# Patient Record
Sex: FEMALE | URBAN - METROPOLITAN AREA
[De-identification: names, ages, dates, MRNs, and addresses within clinical notes are randomized per-mention and may not be internally consistent; named-entity substitution may affect disease eponyms.]

---

## 2022-12-13 ENCOUNTER — OFFICE (OUTPATIENT)
Dept: URBAN - METROPOLITAN AREA CLINIC 93 | Facility: CLINIC | Age: 27
Setting detail: OPHTHALMOLOGY
End: 2022-12-13
Payer: COMMERCIAL

## 2022-12-13 DIAGNOSIS — H53.40: ICD-10-CM

## 2022-12-13 PROCEDURE — 92250 FUNDUS PHOTOGRAPHY W/I&R: CPT | Performed by: OPHTHALMOLOGY

## 2022-12-13 PROCEDURE — 92133 CPTRZD OPH DX IMG PST SGM ON: CPT | Performed by: OPHTHALMOLOGY

## 2022-12-13 PROCEDURE — 92004 COMPRE OPH EXAM NEW PT 1/>: CPT | Performed by: OPHTHALMOLOGY

## 2022-12-13 PROCEDURE — 92083 EXTENDED VISUAL FIELD XM: CPT | Performed by: OPHTHALMOLOGY

## 2022-12-13 ASSESSMENT — SPHEQUIV_DERIVED: OD_SPHEQUIV: 2.125

## 2022-12-13 ASSESSMENT — AXIALLENGTH_DERIVED: OD_AL: 22.5378

## 2022-12-13 ASSESSMENT — KERATOMETRY
OS_K1POWER_DIOPTERS: 43.50
OS_K2POWER_DIOPTERS: 44.25
OS_AXISANGLE_DEGREES: 072
OD_K2POWER_DIOPTERS: 45.25
OD_K1POWER_DIOPTERS: 43.25
OD_AXISANGLE_DEGREES: 093

## 2022-12-13 ASSESSMENT — TONOMETRY
OS_IOP_MMHG: 13
OD_IOP_MMHG: 12

## 2022-12-13 ASSESSMENT — CONFRONTATIONAL VISUAL FIELD TEST (CVF)
OD_FINDINGS: FULL
OS_FINDINGS: FULL

## 2022-12-13 ASSESSMENT — REFRACTION_AUTOREFRACTION
OD_AXIS: 002
OD_CYLINDER: -1.25
OS_SPHERE: PLANO
OD_SPHERE: +2.75

## 2022-12-13 ASSESSMENT — VISUAL ACUITY
OS_BCVA: 20/40-2
OD_BCVA: 20/20-1

## 2022-12-14 ENCOUNTER — OFFICE (OUTPATIENT)
Dept: URBAN - METROPOLITAN AREA CLINIC 77 | Facility: CLINIC | Age: 27
Setting detail: OPHTHALMOLOGY
End: 2022-12-14
Payer: COMMERCIAL

## 2022-12-14 ENCOUNTER — INPATIENT (INPATIENT)
Facility: HOSPITAL | Age: 27
LOS: 14 days | Discharge: HOME | DRG: 60 | End: 2022-12-29
Attending: SPECIALIST | Admitting: SPECIALIST
Payer: COMMERCIAL

## 2022-12-14 VITALS
SYSTOLIC BLOOD PRESSURE: 156 MMHG | HEIGHT: 63 IN | RESPIRATION RATE: 20 BRPM | HEART RATE: 84 BPM | DIASTOLIC BLOOD PRESSURE: 89 MMHG | OXYGEN SATURATION: 99 % | WEIGHT: 169.98 LBS | TEMPERATURE: 98 F

## 2022-12-14 DIAGNOSIS — H54.61 UNQUALIFIED VISUAL LOSS, RIGHT EYE, NORMAL VISION LEFT EYE: ICD-10-CM

## 2022-12-14 DIAGNOSIS — U07.1: ICD-10-CM

## 2022-12-14 DIAGNOSIS — H53.40: ICD-10-CM

## 2022-12-14 DIAGNOSIS — H46.8: ICD-10-CM

## 2022-12-14 PROBLEM — Z00.00 ENCOUNTER FOR PREVENTIVE HEALTH EXAMINATION: Status: ACTIVE | Noted: 2022-12-14

## 2022-12-14 LAB
ALBUMIN SERPL ELPH-MCNC: 4.2 G/DL — SIGNIFICANT CHANGE UP (ref 3.3–5)
ALP SERPL-CCNC: 113 U/L — SIGNIFICANT CHANGE UP (ref 40–120)
ALT FLD-CCNC: 47 U/L — HIGH (ref 10–45)
ANION GAP SERPL CALC-SCNC: 13 MMOL/L — SIGNIFICANT CHANGE UP (ref 5–17)
AST SERPL-CCNC: 26 U/L — SIGNIFICANT CHANGE UP (ref 10–40)
BASOPHILS # BLD AUTO: 0.04 K/UL — SIGNIFICANT CHANGE UP (ref 0–0.2)
BASOPHILS NFR BLD AUTO: 0.5 % — SIGNIFICANT CHANGE UP (ref 0–2)
BILIRUB SERPL-MCNC: 0.3 MG/DL — SIGNIFICANT CHANGE UP (ref 0.2–1.2)
BUN SERPL-MCNC: 11 MG/DL — SIGNIFICANT CHANGE UP (ref 7–23)
CALCIUM SERPL-MCNC: 9.6 MG/DL — SIGNIFICANT CHANGE UP (ref 8.4–10.5)
CHLORIDE SERPL-SCNC: 102 MMOL/L — SIGNIFICANT CHANGE UP (ref 96–108)
CO2 SERPL-SCNC: 23 MMOL/L — SIGNIFICANT CHANGE UP (ref 22–31)
CREAT SERPL-MCNC: 0.72 MG/DL — SIGNIFICANT CHANGE UP (ref 0.5–1.3)
CRP SERPL-MCNC: 9 MG/L — HIGH (ref 0–4)
EGFR: 117 ML/MIN/1.73M2 — SIGNIFICANT CHANGE UP
EOSINOPHIL # BLD AUTO: 0.14 K/UL — SIGNIFICANT CHANGE UP (ref 0–0.5)
EOSINOPHIL NFR BLD AUTO: 1.7 % — SIGNIFICANT CHANGE UP (ref 0–6)
ERYTHROCYTE [SEDIMENTATION RATE] IN BLOOD: 53 MM/HR — HIGH (ref 0–15)
FLUAV AG NPH QL: SIGNIFICANT CHANGE UP
FLUBV AG NPH QL: SIGNIFICANT CHANGE UP
GLUCOSE SERPL-MCNC: 96 MG/DL — SIGNIFICANT CHANGE UP (ref 70–99)
HCG SERPL-ACNC: <2 MIU/ML — SIGNIFICANT CHANGE UP
HCT VFR BLD CALC: 42.1 % — SIGNIFICANT CHANGE UP (ref 34.5–45)
HGB BLD-MCNC: 13.3 G/DL — SIGNIFICANT CHANGE UP (ref 11.5–15.5)
IMM GRANULOCYTES NFR BLD AUTO: 0.4 % — SIGNIFICANT CHANGE UP (ref 0–0.9)
LYMPHOCYTES # BLD AUTO: 2.61 K/UL — SIGNIFICANT CHANGE UP (ref 1–3.3)
LYMPHOCYTES # BLD AUTO: 32.3 % — SIGNIFICANT CHANGE UP (ref 13–44)
MCHC RBC-ENTMCNC: 25.8 PG — LOW (ref 27–34)
MCHC RBC-ENTMCNC: 31.6 GM/DL — LOW (ref 32–36)
MCV RBC AUTO: 81.7 FL — SIGNIFICANT CHANGE UP (ref 80–100)
MONOCYTES # BLD AUTO: 0.51 K/UL — SIGNIFICANT CHANGE UP (ref 0–0.9)
MONOCYTES NFR BLD AUTO: 6.3 % — SIGNIFICANT CHANGE UP (ref 2–14)
NEUTROPHILS # BLD AUTO: 4.74 K/UL — SIGNIFICANT CHANGE UP (ref 1.8–7.4)
NEUTROPHILS NFR BLD AUTO: 58.8 % — SIGNIFICANT CHANGE UP (ref 43–77)
NRBC # BLD: 0 /100 WBCS — SIGNIFICANT CHANGE UP (ref 0–0)
PLATELET # BLD AUTO: 331 K/UL — SIGNIFICANT CHANGE UP (ref 150–400)
POTASSIUM SERPL-MCNC: 4.1 MMOL/L — SIGNIFICANT CHANGE UP (ref 3.5–5.3)
POTASSIUM SERPL-SCNC: 4.1 MMOL/L — SIGNIFICANT CHANGE UP (ref 3.5–5.3)
PROT SERPL-MCNC: 8.6 G/DL — HIGH (ref 6–8.3)
RBC # BLD: 5.15 M/UL — SIGNIFICANT CHANGE UP (ref 3.8–5.2)
RBC # FLD: 14.1 % — SIGNIFICANT CHANGE UP (ref 10.3–14.5)
RSV RNA NPH QL NAA+NON-PROBE: SIGNIFICANT CHANGE UP
SARS-COV-2 RNA SPEC QL NAA+PROBE: SIGNIFICANT CHANGE UP
SODIUM SERPL-SCNC: 138 MMOL/L — SIGNIFICANT CHANGE UP (ref 135–145)
WBC # BLD: 8.07 K/UL — SIGNIFICANT CHANGE UP (ref 3.8–10.5)
WBC # FLD AUTO: 8.07 K/UL — SIGNIFICANT CHANGE UP (ref 3.8–10.5)

## 2022-12-14 PROCEDURE — G1004: CPT

## 2022-12-14 PROCEDURE — 92083 EXTENDED VISUAL FIELD XM: CPT | Performed by: OPHTHALMOLOGY

## 2022-12-14 PROCEDURE — 92235 FLUORESCEIN ANGRPH MLTIFRAME: CPT | Performed by: OPHTHALMOLOGY

## 2022-12-14 PROCEDURE — 92250 FUNDUS PHOTOGRAPHY W/I&R: CPT | Performed by: OPHTHALMOLOGY

## 2022-12-14 PROCEDURE — 99215 OFFICE O/P EST HI 40 MIN: CPT | Performed by: OPHTHALMOLOGY

## 2022-12-14 PROCEDURE — 99285 EMERGENCY DEPT VISIT HI MDM: CPT

## 2022-12-14 PROCEDURE — 99222 1ST HOSP IP/OBS MODERATE 55: CPT

## 2022-12-14 PROCEDURE — 70498 CT ANGIOGRAPHY NECK: CPT | Mod: 26

## 2022-12-14 PROCEDURE — 70496 CT ANGIOGRAPHY HEAD: CPT | Mod: 26,MG

## 2022-12-14 RX ORDER — ENOXAPARIN SODIUM 100 MG/ML
40 INJECTION SUBCUTANEOUS EVERY 24 HOURS
Refills: 0 | Status: DISCONTINUED | OUTPATIENT
Start: 2022-12-14 | End: 2022-12-16

## 2022-12-14 RX ORDER — PANTOPRAZOLE SODIUM 20 MG/1
40 TABLET, DELAYED RELEASE ORAL DAILY
Refills: 0 | Status: DISCONTINUED | OUTPATIENT
Start: 2022-12-14 | End: 2022-12-29

## 2022-12-14 RX ADMIN — Medication 58 MILLIGRAM(S): at 18:12

## 2022-12-14 ASSESSMENT — REFRACTION_MANIFEST
OS_SPHERE: +2.75
OD_AXIS: 5
OD_CYLINDER: -1.25
OD_VA1: 20/50
OD_SPHERE: +1.50

## 2022-12-14 ASSESSMENT — VISUAL ACUITY
OS_BCVA: 20/50
OD_BCVA: 20/20-1

## 2022-12-14 ASSESSMENT — KERATOMETRY
OS_K2POWER_DIOPTERS: 44.25
OD_K1POWER_DIOPTERS: 43.25
OD_AXISANGLE_DEGREES: 093
OS_K1POWER_DIOPTERS: 43.50
OS_AXISANGLE_DEGREES: 072
OD_K2POWER_DIOPTERS: 45.25

## 2022-12-14 ASSESSMENT — REFRACTION_AUTOREFRACTION
OD_SPHERE: +2.75
OD_AXIS: 002
OS_SPHERE: PLANO
OD_CYLINDER: -1.25

## 2022-12-14 ASSESSMENT — AXIALLENGTH_DERIVED
OD_AL: 22.9912
OD_AL: 22.5378

## 2022-12-14 ASSESSMENT — CONFRONTATIONAL VISUAL FIELD TEST (CVF)
OD_FINDINGS: FULL
OS_FINDINGS: FULL

## 2022-12-14 ASSESSMENT — SPHEQUIV_DERIVED
OD_SPHEQUIV: 2.125
OD_SPHEQUIV: 0.875

## 2022-12-14 NOTE — ED ADULT NURSE NOTE - NSIMPLEMENTINTERV_GEN_ALL_ED
Implemented All Universal Safety Interventions:  [place] to call system. Call bell, personal items and telephone within reach. Instruct patient to call for assistance. Room bathroom lighting operational. Non-slip footwear when patient is off stretcher. Physically safe environment: no spills, clutter or unnecessary equipment. Stretcher in lowest position, wheels locked, appropriate side rails in place.

## 2022-12-14 NOTE — ED PROVIDER NOTE - PROGRESS NOTE DETAILS
Terrence, PGY4 - ophtho at bedside Terrence, PGY4 - pt seen by neuro, recommending high dose steroids (Solumedrol 1000mg) & MRI brain/orbits/C/T spine. Spoke to radiologist, states MRI schedule currently full unlikely to be performed emergently; did recommend CT for more urgent imaging. D/w pt, pt agreeable to CT

## 2022-12-14 NOTE — ED ADULT NURSE NOTE - OBJECTIVE STATEMENT
27 y f came to the ed c/o right eye vision loss. started yesterday morning when she woke up with blurry vision. blurry vision has gradually become worse to vision loss. says the vision loss is in the bottom right part of the eye. went to 2 different eye doctors yesterday and was sent to the ED for further work up and imaging. patient denies any complaints or trauma. no weakness, facial droop or extremity drop.

## 2022-12-14 NOTE — H&P ADULT - NSHPPHYSICALEXAM_GEN_ALL_CORE
Vital Signs Last 24 Hrs  T(C): 36.9 (14 Dec 2022 13:29), Max: 36.9 (14 Dec 2022 13:29)  T(F): 98.4 (14 Dec 2022 13:29), Max: 98.4 (14 Dec 2022 13:29)  HR: 84 (14 Dec 2022 13:29) (84 - 84)  BP: 156/89 (14 Dec 2022 13:29) (156/89 - 156/89)  RR: 20 (14 Dec 2022 13:29) (20 - 20)  SpO2: 99% (14 Dec 2022 13:29) (99% - 99%)    General:  Constitutional: Seated in examination chair, appears stated age, in some distress due to symptoms.  Head: Normocephalic & atraumatic.  Eyes:   Respiratory: Breathing comfortably on room air.    Neurological (>12):  MS: Eyes open, alert, oriented to person, place, situation, time. Recent and remote memory intact. Normal affect. Follows all commands.  Speech/Language: Clear, fluent with good repetition. Naming intact.  CNs: PERRL. Inferior half of visual field impaired OD. EOMI, no nystagmus, no diplopia. V1-3 intact to LT, Well developed masseter muscles b/l. No facial asymmetry b/l, full eye closure strength b/l. Hearing grossly normal to conversation. Symmetric palate elevation in midline, no uvula deviation. Gag reflex deferred. Head turning & shoulder shrug intact b/l. Tongue midline, normal movements.  Motor: Muscle bulk and tone are normal. There is no pronator drift.     RUE/LUE: 5/5 shoulder abductors, elbow flexors/extensors, wrist flexors/extensors, finger .    RLE/LLE: 5/5 hip flexors, knee flexors/extensors, ankle dorsiflexors and plantarflexors.  Sensation: Intact to LT b/l throughout.  Cortical: No hemineglect, no extinction to double sided stimulation (tactile).  Reflexes: 2+ biceps, brachioradialis, triceps, and patellars b/l.   Coordination: No dysmetria to FTN/HTS.  Gait: Not assessed due to safety concerns. Vital Signs Last 24 Hrs  T(C): 36.9 (14 Dec 2022 13:29), Max: 36.9 (14 Dec 2022 13:29)  T(F): 98.4 (14 Dec 2022 13:29), Max: 98.4 (14 Dec 2022 13:29)  HR: 84 (14 Dec 2022 13:29) (84 - 84)  BP: 156/89 (14 Dec 2022 13:29) (156/89 - 156/89)  RR: 20 (14 Dec 2022 13:29) (20 - 20)  SpO2: 99% (14 Dec 2022 13:29) (99% - 99%)    General:  Constitutional: Seated in examination chair, appears stated age, in some distress due to symptoms.  Head: Normocephalic & atraumatic.  Eyes: Normal fundus with sharp discs OU.  Respiratory: Breathing comfortably on room air.    Neurological (>12):  MS: Eyes open, alert, oriented to person, place, situation, time. Recent and remote memory intact. Normal affect. Follows all commands.  Speech/Language: Clear, fluent with good repetition. Naming intact.  CNs: PERRL. +RAPD OD.  Inferior half of visual field impaired OD. Visual acuity 20/200 OD and 20/20 OS, EOMI, no nystagmus, no diplopia. V1-3 intact to LT, Well developed masseter muscles b/l. No facial asymmetry b/l, full eye closure strength b/l. Hearing grossly normal to conversation. Symmetric palate elevation in midline, no uvula deviation. Gag reflex deferred. Head turning & shoulder shrug intact b/l. Tongue midline, normal movements.  Motor: Muscle bulk and tone are normal. There is no pronator drift.     RUE/LUE: 5/5 shoulder abductors, elbow flexors/extensors, wrist flexors/extensors, finger .    RLE/LLE: 5/5 hip flexors, knee flexors/extensors, ankle dorsiflexors and plantarflexors.  Sensation: Intact to LT b/l throughout.  Cortical: No hemineglect, no extinction to double sided stimulation (tactile).  Reflexes: 2+ biceps, brachioradialis, triceps, and patellars b/l.   Coordination: No dysmetria to FTN/HTS.  Gait: Not assessed due to safety concerns.

## 2022-12-14 NOTE — ED PROVIDER NOTE - ATTENDING CONTRIBUTION TO CARE
Nolan You MD:  I personally saw the patient and performed a substantive portion of the visit including all aspects of the medical decision making.    MDM: 27-year-old female with history of asthma who presents with painless right vision loss that started yesterday morning when patient woke up.  Patient states that the inferior visual field is blurry and has been progressing/worsening.  Patient denies any double vision, eye pain, numbness, weakness, slurred speech, difficulty with ambulation.  Patient was sent in by retinal specialist.  On examination patient has significantly decreased vision in the right eye over the inferior visual field.  Significantly decreased visual acuity of the right eye.  There is no injection, diplopia.  Remainder of eye examination deferred to ophthalmology.  Neurologic examination benign.  Differential includes but is not limited to acute orbital pathology including optic neuritis, retinal detachment, vitreous hemorrhage, CRAO/CRVO.  Less likely due to CVA given no neurodeficits on examination.  Will obtain CT Head to evaluate for acute intracranial pathology.  We will also obtain CTA of head and neck.  Neurology and ophthalmology consulted.  Neurology recommended high-dose steroids and MRI.  Neurology accepted patient to their service.  The patient will need to be admitted to the hospital for continued evaluation and management, as well as to optimize medical management and to provide outpatient needs assessment.  Discussed with the accepting physician regarding the initial presentation, diagnostic studies, treatments given in the ED, and current plan of care.   The patient was accepted by and endorsed to the neurology team pending full labs and imaging and ophthalmology recommendations. Nolan You MD:  I personally saw the patient and performed a substantive portion of the visit including all aspects of the medical decision making.    MDM: 27-year-old female with history of asthma who presents with painless right vision loss that started yesterday morning when patient woke up.  Patient states that the inferior visual field is blurry and has been progressing/worsening.  Patient denies any double vision, eye pain, numbness, weakness, slurred speech, difficulty with ambulation.  Patient was sent in by retinal specialist.  On examination patient has significantly decreased vision in the right eye over the inferior visual field.  Significantly decreased visual acuity of the right eye.  There is no injection, diplopia.  Remainder of eye examination deferred to ophthalmology.  Neurologic examination benign.  Differential includes but is not limited to acute orbital pathology including optic neuritis, retinal detachment, vitreous hemorrhage, CRAO/CRVO.  Less likely due to CVA given no neurodeficits on examination.  Will obtain CT Head to evaluate for acute intracranial pathology.  We will also obtain CTA of head and neck.  Neurology and ophthalmology consulted.  Neurology recommended high-dose steroids and MRI.    Labs and CT/CTA imaging reviewed, nonactionable.  Discussed with consulting services.  Neurology accepted patient to their service.  The patient will need to be admitted to the hospital for continued evaluation and management, as well as to optimize medical management and to provide outpatient needs assessment.  Discussed with the accepting physician regarding the initial presentation, diagnostic studies, treatments given in the ED, and current plan of care.   The patient was accepted by and endorsed to the neurology team.

## 2022-12-14 NOTE — H&P ADULT - HISTORY OF PRESENT ILLNESS
27 year-old woman with a PMH of asthma brought in by family to the ED on 12/14/22 with c/o acute vision loss OD of 2 days duration. Patient reports symptom onset on 12/13, the morning prior to presentation, when she woke up from sleep around 07:45 AM. At that time she noted the lower half of her vision in her right eye was blurry. She reports her vision remained unchanged for the duration of the day, until she went to bed. Upon waking up today, she states the vision in her right eye had worsened, becoming blurry in the entire eye, as well as dark/black in the lower half of the visual field. She reports evaluation by an Ophthalmologist the day prior, shortly after initial symptom onset, and by a retinal specialist today, who informed her she may have "retrobulbar optic neuropathy", prompting her presentation to the ED. She does not report any associated pain with eye movements, diplopia, headache, dizziness, numbness, tingling, weakness, or difficulty with gait. No recent fever, chills, night sweats, myalgias, unintentional weight loss or weight gain, difficulty chewing or swallowing, recent illness, injury, or recent travel. She has never had symptoms like this before. She has a sister with a thyroid disorder, but otherwise no known family history of autoimmune disorders.

## 2022-12-14 NOTE — H&P ADULT - NSHPLABSRESULTS_GEN_ALL_CORE
LABS:  CBC                       13.3   8.07  )-----------( 331      ( 14 Dec 2022 16:11 )             42.1     Chem 12-14    138  |  102  |  11  ----------------------------<  96  4.1   |  23  |  0.72    Ca    9.6      14 Dec 2022 16:11    TPro  8.6<H>  /  Alb  4.2  /  TBili  0.3  /  DBili  x   /  AST  26  /  ALT  47<H>  /  AlkPhos  113  12-14    LFTs LIVER FUNCTIONS - ( 14 Dec 2022 16:11 )  Alb: 4.2 g/dL / Pro: 8.6 g/dL / ALK PHOS: 113 U/L / ALT: 47 U/L / AST: 26 U/L / GGT: x

## 2022-12-14 NOTE — H&P ADULT - ASSESSMENT
27 year-old woman with a PMH of asthma brought in by family to the ED on 12/14/22 with c/o acute vision loss OD of 2 days duration described as initial blurry vision in the inferior half of the visual field in the right eye, with progression to blurry vision in the entire right eye and darkening of the inferior half of the visual field in the right eye, for which Neurology was consulted. VS on presentation notable for /89, otherwise wnl. CBC, CMP wnl. Exam notable for decreased visual acuity OD (20/200) compared to OS (20/20), inferior visual field deficit OD, red color desaturation OD.     Impression: Acute vision loss OD possibly due to optic neuritis vs secondary to underlying ischemic vs toxic-metabolic vs infiltrative or compressive etiologies.    Plan:   [] MRI brain & orbits w/wo contrast  [] MRI C and T-Spine w/wo contrast (to evaluate for other demyelinating lesions)  [] Solumedrol 1g IV for 3 days followed by PO taper as per ONTT   [] SERUM: A1C, lipid panel, ESR, CRP, TSH, vitamin D 25-OH, B12, B9, NMO ab, MOG, HAIDER, ANCA, ACE, Lyme Ab, quantiferon gold, RPR  [] Possible LP, pending imaging  [] Opthalmology evaluation     #Preventive Measures  - ISS & POCT glucose monitoring  - GI ppx with PPI while on high dose steroids  - Telemonitoring, Neurochecks/VS Q4H  - DVT ppx   27 year-old woman with a PMH of asthma brought in by family to the ED on 12/14/22 with c/o acute vision loss OD of 2 days duration described as initial blurry vision in the inferior half of the visual field in the right eye, with progression to blurry vision in the entire right eye and darkening of the inferior half of the visual field in the right eye, for which Neurology was consulted. VS on presentation notable for /89, otherwise wnl. CBC, CMP wnl. Exam notable for decreased visual acuity OD (20/200) compared to OS (20/20), inferior visual field deficit OD, red color desaturation OD.     Impression: Acute vision loss OD possibly due to optic neuritis vs secondary to underlying ischemic vs toxic-metabolic vs infiltrative or compressive etiologies.    Plan:   [] MRI brain & orbits w/wo contrast  [] MRI C and T-Spine w/wo contrast (to evaluate for other demyelinating lesions)  [] Solumedrol 1g IV for 3 days followed by PO taper as per ONTT   [] SERUM: A1C, lipid panel, ESR, CRP, TSH, vitamin D 25-OH, B12, B9, NMO ab, MOG, HAIDER, ANCA, ACE, Lyme Ab, quantiferon gold, RPR  [] Possible LP, pending imaging  [] Ophthalmology evaluation     #Preventive Measures  - ISS & POCT glucose monitoring  - GI ppx with PPI while on high dose steroids  - Telemonitoring, Neurochecks/VS Q4H  - DVT ppx   27 year-old woman with a PMH of asthma brought in by family to the ED on 12/14/22 with c/o acute vision loss OD of 2 days duration described as initial blurry vision in the inferior half of the visual field in the right eye, with progression to blurry vision in the entire right eye and darkening of the inferior half of the visual field in the right eye, for which Neurology was consulted. VS on presentation notable for /89, otherwise wnl. CBC, CMP wnl. Exam notable for decreased visual acuity OD (20/200) compared to OS (20/20), +RAPD OD, inferior visual field deficit OD, possible red color desaturation OD.     Impression: Acute vision loss OD possibly due to optic neuritis vs optic neuropathy secondary to ischemic vs toxic-metabolic vs infiltrative or compressive etiologies.    Plan:   [] MRI brain & orbits w/wo contrast  [] MRI C and T-Spine w/wo contrast (to evaluate for demyelinating lesions)  [] Solumedrol 1g IV for 3 days followed by PO taper as per ONTT   [] SERUM: A1C, lipid panel, ESR, CRP, TSH, vitamin D 25-OH, B12, B9, NMO ab, MOG, HAIDER, ANCA, ACE, Lyme Ab, quantiferon gold, RPR  [] Possible LP, pending imaging  [] Ophthalmology evaluation     #Preventive Measures  - ISS & POCT glucose monitoring  - GI ppx with PPI while on high dose steroids  - Telemonitoring, Neurochecks/VS Q4H  - DVT ppx

## 2022-12-14 NOTE — CONSULT NOTE ADULT - ATTENDING COMMENTS
I have seen and examined the patient and agree with the findings based on the resident note above. Ophthalmology will continue to follow. 60 min spent on patient encounter.

## 2022-12-14 NOTE — ED PROVIDER NOTE - PHYSICAL EXAMINATION
I have reviewed the triage vital signs.  Const: AAOx3, in NAD  Eyes: External appearance: OD no discharge or conjunctival injection; OS no discharge or conjunctival injection  Pupils: OD round and reactive; OS round and reactive  Extraocular movements: OD intact; OS intact  Visual Fields: OD intact x 3, inferior visual field loss defect; OS intact x 4  Visual Acuity: OD 20/200; OS 20/20  Lids/Lashes/Lacrimal: OD no lesions; OS no lesions  Conjunctiva & Sclera: OD white; OS white  HENT: NCAT, Neck supple, oral mucosa moist  CV: RRR, +S1, S2  Resp: CTAB, no respiratory distress  GI: Abdomen soft, NTND, no guarding  : no CVA tenderness  Extremities: No peripheral edema,  2+ radial and DP pulses  Skin: Warm, well perfused, no rash  MSK: No gross deformities appreciated  Neuro: CN2-12 grossly intact,MS +5/5 in UE and LE BL, finger to nose smooth and rapid, gross sensation intact in UE and LE BL, gait smooth and coordinated, negative rhomberg, negative pronator drift   Psych: Appropriate mood and affect

## 2022-12-14 NOTE — ED PROVIDER NOTE - OBJECTIVE STATEMENT
27F PMH asthma Presents with painless right vision loss.  Patient reports being in usual state of health until waking up at 7:45 AM.  States that the right inferior aspect of her vision was blurry.  Saw an ophthalmologist yesterday and was recommended to see a retinal specialist today.  Patient states that vision has progressively worsened and that the inferior aspect of R visual field is now black.  Denies color vision change.  Denies any eye pain, fevers, nausea, vomiting, numbness, tingling, weakness.  No difficulty swallowing/speaking/walking.  No recent viral illness or other infection.  Ophthalmology exam notes reviewed and and showing OD inferior visual field loss with enlarged scotoma, no retinal detachment. Sent to ED by retinal specialist Dr. Grissom for MRI imaging and c/f retrobulbar optic neuropathy.

## 2022-12-14 NOTE — ED PROVIDER NOTE - CLINICAL SUMMARY MEDICAL DECISION MAKING FREE TEXT BOX
Terrence, PGY4 - 27F p/w progressive R vision loss, LKW over 24hrs ago.  No trauma or preceding infection. OD inferior visual field defect, neuro exam otherwise normal. DDx includes but not limited to optic neuritis, retinal detachment, lower suspicion CRAO/CRVO/CVA. Plan for labs, neuro/ophtho eval, tba

## 2022-12-14 NOTE — H&P ADULT - ATTENDING COMMENTS
HPI outlined above.   Patient awoke 2 days ago with blurring of her right vision without pain. Denies any other focal symptoms and has not had any recent infections or fevers.   Opthalmology evaluated her and was suspicious for retrobulbar ON in light of normal retinal exam  Started on IVMP last night and given a 2nd dose today    On exam: EOMI. + R APD. Cannot see examiner's finger or motion in most of the right FOV except for sliver of the superior aspect. There is no nystagmus. Face symmetric with tongue ML  Strength 5/5 throughout  Sensory: intact LT b/l  Coord: Intact FNF  DTRs 2+ UE and 3+ LE with + babinskis and no clonus    Imp: Possible retrobulbar ON.   - MRI brain, orbits, c/t spine wwac  - consider LP  -check serum labs outlined above  - f/u othalmology

## 2022-12-15 LAB
24R-OH-CALCIDIOL SERPL-MCNC: 36.4 NG/ML — SIGNIFICANT CHANGE UP (ref 30–80)
A1C WITH ESTIMATED AVERAGE GLUCOSE RESULT: 5.4 % — SIGNIFICANT CHANGE UP (ref 4–5.6)
ALBUMIN SERPL ELPH-MCNC: 4.5 G/DL — SIGNIFICANT CHANGE UP (ref 3.3–5)
ALP SERPL-CCNC: 118 U/L — SIGNIFICANT CHANGE UP (ref 40–120)
ALT FLD-CCNC: 46 U/L — HIGH (ref 10–45)
ANION GAP SERPL CALC-SCNC: 15 MMOL/L — SIGNIFICANT CHANGE UP (ref 5–17)
AST SERPL-CCNC: 21 U/L — SIGNIFICANT CHANGE UP (ref 10–40)
B BURGDOR C6 AB SER-ACNC: NEGATIVE — SIGNIFICANT CHANGE UP
B BURGDOR IGG+IGM SER-ACNC: 0.07 INDEX — SIGNIFICANT CHANGE UP (ref 0.01–0.89)
BILIRUB SERPL-MCNC: 0.6 MG/DL — SIGNIFICANT CHANGE UP (ref 0.2–1.2)
BUN SERPL-MCNC: 11 MG/DL — SIGNIFICANT CHANGE UP (ref 7–23)
CALCIUM SERPL-MCNC: 10.3 MG/DL — SIGNIFICANT CHANGE UP (ref 8.4–10.5)
CHLORIDE SERPL-SCNC: 100 MMOL/L — SIGNIFICANT CHANGE UP (ref 96–108)
CHOLEST SERPL-MCNC: 238 MG/DL — HIGH
CO2 SERPL-SCNC: 20 MMOL/L — LOW (ref 22–31)
CREAT SERPL-MCNC: 0.61 MG/DL — SIGNIFICANT CHANGE UP (ref 0.5–1.3)
EGFR: 126 ML/MIN/1.73M2 — SIGNIFICANT CHANGE UP
ESTIMATED AVERAGE GLUCOSE: 108 MG/DL — SIGNIFICANT CHANGE UP (ref 68–114)
FOLATE SERPL-MCNC: 8.5 NG/ML — SIGNIFICANT CHANGE UP
GLUCOSE BLDC GLUCOMTR-MCNC: 166 MG/DL — HIGH (ref 70–99)
GLUCOSE BLDC GLUCOMTR-MCNC: 182 MG/DL — HIGH (ref 70–99)
GLUCOSE SERPL-MCNC: 143 MG/DL — HIGH (ref 70–99)
HCT VFR BLD CALC: 45.8 % — HIGH (ref 34.5–45)
HDLC SERPL-MCNC: 55 MG/DL — SIGNIFICANT CHANGE UP
HGB BLD-MCNC: 14.7 G/DL — SIGNIFICANT CHANGE UP (ref 11.5–15.5)
LIPID PNL WITH DIRECT LDL SERPL: 176 MG/DL — HIGH
MAGNESIUM SERPL-MCNC: 2 MG/DL — SIGNIFICANT CHANGE UP (ref 1.6–2.6)
MCHC RBC-ENTMCNC: 25.7 PG — LOW (ref 27–34)
MCHC RBC-ENTMCNC: 32.1 GM/DL — SIGNIFICANT CHANGE UP (ref 32–36)
MCV RBC AUTO: 79.9 FL — LOW (ref 80–100)
NON HDL CHOLESTEROL: 184 MG/DL — HIGH
NRBC # BLD: 0 /100 WBCS — SIGNIFICANT CHANGE UP (ref 0–0)
PHOSPHATE SERPL-MCNC: 2.7 MG/DL — SIGNIFICANT CHANGE UP (ref 2.5–4.5)
PLATELET # BLD AUTO: 377 K/UL — SIGNIFICANT CHANGE UP (ref 150–400)
POTASSIUM SERPL-MCNC: 4.3 MMOL/L — SIGNIFICANT CHANGE UP (ref 3.5–5.3)
POTASSIUM SERPL-SCNC: 4.3 MMOL/L — SIGNIFICANT CHANGE UP (ref 3.5–5.3)
PROT SERPL-MCNC: 9.2 G/DL — HIGH (ref 6–8.3)
RBC # BLD: 5.73 M/UL — HIGH (ref 3.8–5.2)
RBC # FLD: 13.8 % — SIGNIFICANT CHANGE UP (ref 10.3–14.5)
SODIUM SERPL-SCNC: 135 MMOL/L — SIGNIFICANT CHANGE UP (ref 135–145)
T PALLIDUM AB TITR SER: NEGATIVE — SIGNIFICANT CHANGE UP
T4 AB SER-ACNC: 7.9 UG/DL — SIGNIFICANT CHANGE UP (ref 4.6–12)
TRIGL SERPL-MCNC: 37 MG/DL — SIGNIFICANT CHANGE UP
TSH SERPL-MCNC: 0.65 UIU/ML — SIGNIFICANT CHANGE UP (ref 0.27–4.2)
VIT B12 SERPL-MCNC: 722 PG/ML — SIGNIFICANT CHANGE UP (ref 232–1245)
WBC # BLD: 8.44 K/UL — SIGNIFICANT CHANGE UP (ref 3.8–10.5)
WBC # FLD AUTO: 8.44 K/UL — SIGNIFICANT CHANGE UP (ref 3.8–10.5)

## 2022-12-15 PROCEDURE — 99223 1ST HOSP IP/OBS HIGH 75: CPT

## 2022-12-15 PROCEDURE — 70553 MRI BRAIN STEM W/O & W/DYE: CPT | Mod: 26

## 2022-12-15 PROCEDURE — 72157 MRI CHEST SPINE W/O & W/DYE: CPT | Mod: 26

## 2022-12-15 PROCEDURE — 72156 MRI NECK SPINE W/O & W/DYE: CPT | Mod: 26

## 2022-12-15 PROCEDURE — 70543 MRI ORBT/FAC/NCK W/O &W/DYE: CPT | Mod: 26

## 2022-12-15 RX ORDER — INSULIN LISPRO 100/ML
VIAL (ML) SUBCUTANEOUS AT BEDTIME
Refills: 0 | Status: DISCONTINUED | OUTPATIENT
Start: 2022-12-15 | End: 2022-12-29

## 2022-12-15 RX ORDER — DEXTROSE 50 % IN WATER 50 %
25 SYRINGE (ML) INTRAVENOUS ONCE
Refills: 0 | Status: DISCONTINUED | OUTPATIENT
Start: 2022-12-15 | End: 2022-12-29

## 2022-12-15 RX ORDER — GLUCAGON INJECTION, SOLUTION 0.5 MG/.1ML
1 INJECTION, SOLUTION SUBCUTANEOUS ONCE
Refills: 0 | Status: DISCONTINUED | OUTPATIENT
Start: 2022-12-15 | End: 2022-12-29

## 2022-12-15 RX ORDER — ACETAMINOPHEN 500 MG
650 TABLET ORAL EVERY 6 HOURS
Refills: 0 | Status: DISCONTINUED | OUTPATIENT
Start: 2022-12-15 | End: 2022-12-29

## 2022-12-15 RX ORDER — SODIUM CHLORIDE 9 MG/ML
1000 INJECTION, SOLUTION INTRAVENOUS
Refills: 0 | Status: DISCONTINUED | OUTPATIENT
Start: 2022-12-15 | End: 2022-12-29

## 2022-12-15 RX ORDER — DEXTROSE 50 % IN WATER 50 %
15 SYRINGE (ML) INTRAVENOUS ONCE
Refills: 0 | Status: DISCONTINUED | OUTPATIENT
Start: 2022-12-15 | End: 2022-12-29

## 2022-12-15 RX ORDER — DEXTROSE 50 % IN WATER 50 %
12.5 SYRINGE (ML) INTRAVENOUS ONCE
Refills: 0 | Status: DISCONTINUED | OUTPATIENT
Start: 2022-12-15 | End: 2022-12-29

## 2022-12-15 RX ORDER — INSULIN LISPRO 100/ML
VIAL (ML) SUBCUTANEOUS
Refills: 0 | Status: DISCONTINUED | OUTPATIENT
Start: 2022-12-15 | End: 2022-12-29

## 2022-12-15 RX ADMIN — Medication 650 MILLIGRAM(S): at 21:46

## 2022-12-15 RX ADMIN — Medication 58 MILLIGRAM(S): at 06:06

## 2022-12-15 RX ADMIN — PANTOPRAZOLE SODIUM 40 MILLIGRAM(S): 20 TABLET, DELAYED RELEASE ORAL at 12:00

## 2022-12-15 RX ADMIN — ENOXAPARIN SODIUM 40 MILLIGRAM(S): 100 INJECTION SUBCUTANEOUS at 06:07

## 2022-12-15 RX ADMIN — Medication 650 MILLIGRAM(S): at 20:56

## 2022-12-15 RX ADMIN — Medication 1: at 12:00

## 2022-12-16 ENCOUNTER — TRANSCRIPTION ENCOUNTER (OUTPATIENT)
Age: 27
End: 2022-12-16

## 2022-12-16 LAB
A1C WITH ESTIMATED AVERAGE GLUCOSE RESULT: 5.4 % — SIGNIFICANT CHANGE UP (ref 4–5.6)
ACE SERPL-CCNC: 38 U/L — SIGNIFICANT CHANGE UP (ref 14–82)
ANA PAT FLD IF-IMP: SIGNIFICANT CHANGE UP
ANA TITR SER: ABNORMAL
ANION GAP SERPL CALC-SCNC: 14 MMOL/L — SIGNIFICANT CHANGE UP (ref 5–17)
APTT BLD: 28.3 SEC — SIGNIFICANT CHANGE UP (ref 27.5–35.5)
BUN SERPL-MCNC: 13 MG/DL — SIGNIFICANT CHANGE UP (ref 7–23)
CA-I BLD-SCNC: 1.19 MMOL/L — SIGNIFICANT CHANGE UP (ref 1.15–1.33)
CALCIUM SERPL-MCNC: 9.1 MG/DL — SIGNIFICANT CHANGE UP (ref 8.4–10.5)
CHLORIDE SERPL-SCNC: 103 MMOL/L — SIGNIFICANT CHANGE UP (ref 96–108)
CO2 SERPL-SCNC: 22 MMOL/L — SIGNIFICANT CHANGE UP (ref 22–31)
CREAT SERPL-MCNC: 0.64 MG/DL — SIGNIFICANT CHANGE UP (ref 0.5–1.3)
EGFR: 124 ML/MIN/1.73M2 — SIGNIFICANT CHANGE UP
ESTIMATED AVERAGE GLUCOSE: 108 MG/DL — SIGNIFICANT CHANGE UP (ref 68–114)
GLUCOSE BLDC GLUCOMTR-MCNC: 114 MG/DL — HIGH (ref 70–99)
GLUCOSE BLDC GLUCOMTR-MCNC: 116 MG/DL — HIGH (ref 70–99)
GLUCOSE BLDC GLUCOMTR-MCNC: 132 MG/DL — HIGH (ref 70–99)
GLUCOSE BLDC GLUCOMTR-MCNC: 154 MG/DL — HIGH (ref 70–99)
GLUCOSE SERPL-MCNC: 182 MG/DL — HIGH (ref 70–99)
HCT VFR BLD CALC: 39.2 % — SIGNIFICANT CHANGE UP (ref 34.5–45)
HGB BLD-MCNC: 12.5 G/DL — SIGNIFICANT CHANGE UP (ref 11.5–15.5)
INR BLD: 1.17 RATIO — HIGH (ref 0.88–1.16)
MCHC RBC-ENTMCNC: 25.8 PG — LOW (ref 27–34)
MCHC RBC-ENTMCNC: 31.9 GM/DL — LOW (ref 32–36)
MCV RBC AUTO: 81 FL — SIGNIFICANT CHANGE UP (ref 80–100)
NRBC # BLD: 0 /100 WBCS — SIGNIFICANT CHANGE UP (ref 0–0)
PLATELET # BLD AUTO: 364 K/UL — SIGNIFICANT CHANGE UP (ref 150–400)
POTASSIUM SERPL-MCNC: 4.2 MMOL/L — SIGNIFICANT CHANGE UP (ref 3.5–5.3)
POTASSIUM SERPL-SCNC: 4.2 MMOL/L — SIGNIFICANT CHANGE UP (ref 3.5–5.3)
PROTHROM AB SERPL-ACNC: 13.5 SEC — HIGH (ref 10.5–13.4)
RBC # BLD: 4.84 M/UL — SIGNIFICANT CHANGE UP (ref 3.8–5.2)
RBC # FLD: 14.2 % — SIGNIFICANT CHANGE UP (ref 10.3–14.5)
SODIUM SERPL-SCNC: 139 MMOL/L — SIGNIFICANT CHANGE UP (ref 135–145)
WBC # BLD: 14.07 K/UL — HIGH (ref 3.8–10.5)
WBC # FLD AUTO: 14.07 K/UL — HIGH (ref 3.8–10.5)

## 2022-12-16 PROCEDURE — 99233 SBSQ HOSP IP/OBS HIGH 50: CPT

## 2022-12-16 RX ADMIN — Medication 1: at 09:17

## 2022-12-16 RX ADMIN — Medication 58 MILLIGRAM(S): at 06:28

## 2022-12-16 RX ADMIN — PANTOPRAZOLE SODIUM 40 MILLIGRAM(S): 20 TABLET, DELAYED RELEASE ORAL at 11:43

## 2022-12-16 RX ADMIN — ENOXAPARIN SODIUM 40 MILLIGRAM(S): 100 INJECTION SUBCUTANEOUS at 06:21

## 2022-12-16 NOTE — DISCHARGE NOTE PROVIDER - PROVIDER TOKENS
PROVIDER:[TOKEN:[[id number]],FOLLOWUP:[2 weeks]],PROVIDER:[TOKEN:[[id number]],FOLLOWUP:[2 weeks]]

## 2022-12-16 NOTE — DISCHARGE NOTE PROVIDER - NSDCCPCAREPLAN_GEN_ALL_CORE_FT
PRINCIPAL DISCHARGE DIAGNOSIS  Diagnosis: Vision loss of right eye  Assessment and Plan of Treatment: You came into the hospital for vision difficulty in your right eye for which we obtained an MRI of the brain and eyes. It showed you have concern for inflammation of the optic nerve. We administered high dose steroids and started plasma exchange for concern of a demyelinating process that could be associated with multiple sclerosis. We are discharging you with follow up with Dr Brandee Epperson. We also recommend continuing with steroids.       PRINCIPAL DISCHARGE DIAGNOSIS  Diagnosis: Vision loss of right eye  Assessment and Plan of Treatment: You came into the hospital for vision difficulty in your right eye for which we obtained an MRI of the brain and eyes. It showed you have concern for inflammation of the optic nerve. We administered high dose steroids and started plasma exchange for concern of a demyelinating process that could be associated with multiple sclerosis. We are discharging you with follow up with Dr Brandee Epperson. We also recommend continuing with steroids as prednisone oral tablets 20mg daily for the next four days 12/29/2022 to 01/01/2023. We are also providing protonix oral tablet to prevent heart burn / stomach ulcers while on steroids. If you have any worsening symptoms, please call Dr Epperson's office or come to the Emergency department.

## 2022-12-16 NOTE — PROGRESS NOTE ADULT - SUBJECTIVE AND OBJECTIVE BOX
Neurology  Progress Note  12-16-22    Name: PATRICE ROCHA 27y    Review of Systems: Patient reporting expansion of vision obscuration in lateral visual field of affected right eye    Medications:  MEDICATIONS  (STANDING):  dextrose 5%. 1000 milliLiter(s) (100 mL/Hr) IV Continuous <Continuous>  dextrose 5%. 1000 milliLiter(s) (50 mL/Hr) IV Continuous <Continuous>  dextrose 50% Injectable 25 Gram(s) IV Push once  dextrose 50% Injectable 12.5 Gram(s) IV Push once  dextrose 50% Injectable 25 Gram(s) IV Push once  enoxaparin Injectable 40 milliGRAM(s) SubCutaneous every 24 hours  glucagon  Injectable 1 milliGRAM(s) IntraMuscular once  insulin lispro (ADMELOG) corrective regimen sliding scale   SubCutaneous three times a day before meals  insulin lispro (ADMELOG) corrective regimen sliding scale   SubCutaneous at bedtime  pantoprazole    Tablet 40 milliGRAM(s) Oral daily    MEDICATIONS  (PRN):  acetaminophen     Tablet .. 650 milliGRAM(s) Oral every 6 hours PRN Temp greater or equal to 38C (100.4F), Mild Pain (1 - 3)  dextrose Oral Gel 15 Gram(s) Oral once PRN Blood Glucose LESS THAN 70 milliGRAM(s)/deciliter      Vitals:  T(C): 36.6 (12-16-22 @ 05:02), Max: 36.8 (12-15-22 @ 13:57)  HR: 76 (12-16-22 @ 05:02) (76 - 88)  BP: 95/58 (12-16-22 @ 05:02) (95/58 - 112/73)  RR: 18 (12-16-22 @ 05:02) (17 - 18)  SpO2: 97% (12-16-22 @ 05:02) (95% - 97%)      Neurologic Examination:      - Mental Status: Alert, awake, oriented to person, month, year, location, and situation; speech is fluent and follows commands.    - Cranial Nerves:  II: Pupils equal, round, and reactive to light. Relative APD in OD. Most notably with inferior half of visual field impairment in OD.  III, IV, VI: Extraocular movements are intact without nystagmus  V: Facial sensation is intact in the V1-V3 distribution bilaterally  VII: Face is symmetric with normal eye closure and smile  VIII: Hearing is intact to conversation  IX, X: Uvula is midline and soft palate rises symmetrically  XI: Shoulder shrug intact  XII: Tongue protrudes in the midline    - Motor/Strength Testing:                                 Right           Left  Deltoid                     5                 5  Biceps                      5                 limited by pain at IV site  Triceps                     5                 limited by pain at IV site  Hand                  5                 5  Hip Flex                   5                  5  Dorsiflex                  5                  5  Plantarflex               5                  5    - Normal muscle bulk and tone throughout.    - Reflexes:   Bicep (C5/C6):                  R 2+ --- L 2+   Brachioradialis (C5/C6) :   R 2+ --- L 2+   Patella (L3/L4) :                 R 3+ --- L 3+     - Sensory: Intact throughout to light touch.           Labs:                        14.7   8.44  )-----------( 377      ( 15 Dec 2022 07:29 )             45.8     12-15    135  |  100  |  11  ----------------------------<  143<H>  4.3   |  20<L>  |  0.61    Ca    10.3      15 Dec 2022 07:29  Phos  2.7     12-15  Mg     2.0     12-15    TPro  9.2<H>  /  Alb  4.5  /  TBili  0.6  /  DBili  x   /  AST  21  /  ALT  46<H>  /  AlkPhos  118  12-15    CAPILLARY BLOOD GLUCOSE      POCT Blood Glucose.: 154 mg/dL (16 Dec 2022 08:27)    LIVER FUNCTIONS - ( 15 Dec 2022 07:29 )  Alb: 4.5 g/dL / Pro: 9.2 g/dL / ALK PHOS: 118 U/L / ALT: 46 U/L / AST: 21 U/L / GGT: x              Radiology:  MR Cervical & Thoracic Spine w/wo IV Cont (12.15.22 @ 18:50)   IMPRESSION: No abnormal spinal cord lesions are identified.      CT Head No Cont, CTA H/N (12.14.22 @ 17:36)  IMPRESSION:  CT HEAD: No acute intracranial hemorrhage, midline shift, or   hydrocephalus.    CTA BRAIN: Patent intracranial circulation. No flow-limiting stenosis or   occlusion.    CTA NECK: Patent cervical vasculature. No flow limiting stenosis or   occlusion.

## 2022-12-16 NOTE — DISCHARGE NOTE PROVIDER - CARE PROVIDER_API CALL
Brandee Epperson)  Neurology  [address] [place] [address]  Phone: [phone]  Fax: [phone]  Follow Up Time: 2 weeks    Homero Harris)  Ophthalmology  [address] [facility], [address]  [place] [address]  Phone: [phone]  Fax: [phone]  Follow Up Time: 2 weeks

## 2022-12-16 NOTE — PROGRESS NOTE ADULT - ATTENDING COMMENTS
HPI confirmed. There has been no visual improvement following 3 doses of pulse steroids.     MRI brain and orbits: enhancement in R Optic n. and scattered T2H in b/l hemispheres    On exam: EOMI. + R APD. Cannot see examiner's finger or motion in most of the right FOV except for sliver of the superior aspect. There is no nystagmus. Face symmetric with tongue ML  Strength 5/5 throughout    Imp:  Retrobulbar ON unresponsive to steroids. will need course of PLEX  - f/u MRI c/t spine wwac report  - LP   -plan to start PLEX, consult IR for shiley placement  - cont prednisone taper  - appreciate opthalmology c/s HPI confirmed. There has been no visual improvement following 3 doses of pulse steroids.     MRI brain and orbits: enhancement in R Optic n. and scattered T2H in b/l hemispheres    On exam: EOMI. + R APD. Cannot see examiner's finger or motion in most of the right FOV except for sliver of the superior aspect. There is no nystagmus. Face symmetric with tongue ML  Strength 5/5 throughout    Imp:  Retrobulbar ON unresponsive to steroids. will need course of PLEX  - f/u MRI c/t spine wwac report  - LP   -plan to start PLEX monday, consult IR for shiley placement  - will administer additional 2 doses over IVMP over the weekd.   - appreciate opthalmology c/s

## 2022-12-16 NOTE — PROGRESS NOTE ADULT - ASSESSMENT
Assessment: 27 year-old woman with a PMH of asthma brought in by family to the ED on 12/14/22 with c/o acute vision loss OD of 2 days duration described as initial blurry vision in the inferior half of the visual field in the right eye, with progression to blurry vision in the entire right eye and darkening of the inferior half of the visual field in the right eye, for which Neurology was consulted. VS on presentation notable for /89, otherwise wnl. CBC, CMP wnl. Exam notable for decreased visual acuity OD (20/200) compared to OS (20/20), +RAPD OD, inferior visual field deficit OD, possible red color desaturation OD.     Impression: Acute vision loss OD possibly due to optic neuritis vs optic neuropathy secondary to ischemic vs toxic-metabolic vs infiltrative or compressive etiologies.    Interval history 12/16/22: Patient reporting expansion of vision obscuration in lateral visual field of affected right eye. Patient received her 3rd dose of methylprednisolone 1,000mg IV this morning. MRI C/T spine w/wo contrast w/o abnormal signal. Pending MRI brain & orbits w/wo contrast read. Will f/u Ophthalmology recommendations. Pending additional serum lab results as below. Unclear whether patient would benefit from lumbar puncture at this time.    Plan:   [/] MRI brain & orbits w/wo contrast - f/u read  [x] MRI C and T-Spine w/wo contrast (to evaluate for demyelinating lesions): no abnormal signal  [/] Methylprednisolone 1g IV for 3 days followed by PO taper as per ONTT - will follow-up with MRI read and Ophthalmology recommendations  [x] SERUM: A1C (5.4), lipid panel (, cholesterol 238), ESR (53), CRP (9), TSH/T4 (wnl), vitamin D 25-OH (wnl), B12 (722), folate (wnl), HAIDER (1:320), Borrelia IgG/IgM (negative), RPR (negative).  [/] SERUM (pending results): NMO ab (pending), MOG (pending), QuantiFeron gold (pending), ANCA (pending), ACE (pending)  [] Possible LP, pending imaging  [] Follow-up Ophthalmology recommendations    #Preventive Measures  - ISS & POCT glucose monitoring  - GI ppx with PPI while on high dose steroids  - Telemonitoring, Neurochecks/VS Q4H  - DVT ppx

## 2022-12-16 NOTE — PROGRESS NOTE ADULT - ASSESSMENT
27y female w/ pmhx/ochx of asthma presenting with inferior visual field cut of 1 day duration. Examined by outpatient general ophthalmologist and retina specialist with no abnormal retinal findings. Undergoing workup for optic neuritis OD.     # optic neuritis right eye  - normal retinal exam with no disc edema - confirmed on outpatient OCT RNFL. Inferior hemifield defect confirmed on outpatient HVF testing.   - MR brain and orbits with enhancement optic nerve and multiple enhancing lesions of white matter - consistent with optic neuritis and demyelinating disease  - atypical workup thus far negative  - NMO and MOG serum Abs pending   - s/p 3 doses of IV steroids with no improvement in vision. Can extend IV steroids trial to 5 days but if still no improvement, would initiate PLEX.   - findings discussed with pt and primary team    Discussed with Dr. Harris, neuro ophthalmology attending.     Outpatient follow-up: Patient should follow-up with his/her ophthalmologist or with [facility] Department of Ophthalmology at the address below     [address] [place] [address]. [address]  [place], [place] [address] [phone]

## 2022-12-16 NOTE — PROGRESS NOTE ADULT - SUBJECTIVE AND OBJECTIVE BOX
[facility] DEPARTMENT OF OPHTHALMOLOGY  ------------------------------------------------------------------------------  Sowmya Davis MD PGY-3  ------------------------------------------------------------------------------    Interval History: Reports vision OD worsening. No eye pain or pain with EOMs.     MEDICATIONS  (STANDING):  dextrose 5%. 1000 milliLiter(s) (100 mL/Hr) IV Continuous <Continuous>  dextrose 5%. 1000 milliLiter(s) (50 mL/Hr) IV Continuous <Continuous>  dextrose 50% Injectable 25 Gram(s) IV Push once  dextrose 50% Injectable 12.5 Gram(s) IV Push once  dextrose 50% Injectable 25 Gram(s) IV Push once  glucagon  Injectable 1 milliGRAM(s) IntraMuscular once  insulin lispro (ADMELOG) corrective regimen sliding scale   SubCutaneous three times a day before meals  insulin lispro (ADMELOG) corrective regimen sliding scale   SubCutaneous at bedtime  pantoprazole    Tablet 40 milliGRAM(s) Oral daily    MEDICATIONS  (PRN):  acetaminophen     Tablet .. 650 milliGRAM(s) Oral every 6 hours PRN Temp greater or equal to 38C (100.4F), Mild Pain (1 - 3)  dextrose Oral Gel 15 Gram(s) Oral once PRN Blood Glucose LESS THAN 70 milliGRAM(s)/deciliter      VITALS: T(C): 36.6 (12-16-22 @ 17:03)  T(F): 97.9 (12-16-22 @ 17:03), Max: 98.3 (12-16-22 @ 01:02)  HR: 63 (12-16-22 @ 17:03) (63 - 79)  BP: 99/61 (12-16-22 @ 17:03) (95/58 - 113/75)  RR:  (17 - 18)  SpO2:  (96% - 98%)  Wt(kg): --  General: AAOx3      Ophthalmology Exam:  Visual acuity (sc): HM OD, 20/20 OS  Pupils: PERRL OU,  RAPD OD  Ttono: 10, 11  Extraocular movements (EOMs): Full OU, no pain, no diplopia  Confrontational Visual Field (CVF): global constriction OD, full OS    Pen Light Exam (PLE)  External: Flat OU  Lids/Lashes/Lacrimal Ducts: Flat OU    Sclera/Conjunctiva: W+Q OU  Cornea: Cl OU  Anterior Chamber: D+F OU    Iris: Flat OU  Lens: Cl OU    < from: MR Head w/wo IV Cont (12.15.22 @ 20:26) >    IMPRESSION:  Mild abnormal enhancement and T2 hyperintensity involving the right optic   nerve compatible with optic neuritis.    Multiple foci of white matter T2 hyperintensity which are nonspecific in   appearance. Demyelinating disease is a prime consideration although other   possibilities are not excluded.      < end of copied text >

## 2022-12-16 NOTE — DISCHARGE NOTE PROVIDER - NSDCMRMEDTOKEN_GEN_ALL_CORE_FT
Ventolin HFA 90 mcg/inh inhalation aerosol:    cholecalciferol oral tablet: 2000 unit(s) orally once a day  predniSONE 20 mg oral tablet: 1 tab(s) orally once a day MDD:20mg  Protonix 40 mg oral delayed release tablet: 1 tab(s) orally once a day MDD:40mg  Ventolin HFA 90 mcg/inh inhalation aerosol:

## 2022-12-16 NOTE — DISCHARGE NOTE PROVIDER - HOSPITAL COURSE
27 year-old woman with a PMH of asthma brought in by family to the ED on 12/14/22 with c/o acute vision loss OD of 2 days duration. Patient reports symptom onset on 12/13, the morning prior to presentation, when she woke up from sleep around 07:45 AM. At that time she noted the lower half of her vision in her right eye was blurry. She reports her vision remained unchanged for the duration of the day, until she went to bed. Upon waking up today, she states the vision in her right eye had worsened, becoming blurry in the entire eye, as well as dark/black in the lower half of the visual field. She reports evaluation by an Ophthalmologist the day prior, shortly after initial symptom onset, and by a retinal specialist today, who informed her she may have "retrobulbar optic neuropathy", prompting her presentation to the ED. She does not report any associated pain with eye movements, diplopia, headache, dizziness, numbness, tingling, weakness, or difficulty with gait. No recent fever, chills, night sweats, myalgias, unintentional weight loss or weight gain, difficulty chewing or swallowing, recent illness, injury, or recent travel. She has never had symptoms like this before. She has a sister with a thyroid disorder, but otherwise no known family history of autoimmune disorders.       During hospital course, MRI brain and orbits showed Mild abnormal enhancement and T2 hyperintensity involving the right optic nerve compatible with optic neuritis. MRI c spine was normal. Opthalmology was consulted and recommended * She was given IV solumedrol for 3 days and will continued optic neuritis steriod taper as outpatient.       MRI Brain and orbits w/ w/o  Mild abnormal enhancement and T2 hyperintensity involving the right optic   nerve compatible with optic neuritis. Multiple foci of white matter T2 hyperintensity which are nonspecific in   appearance. Demyelinating disease is a prime consideration although other   possibilities are not excluded.    MR Cervical Spine w/wo IV Cont (12.15.22 @ 18:50)   IMPRESSION: No abnormal spinal cord lesions are identified.               27 year-old woman with a PMH of asthma brought in by family to the ED on 12/14/22 with c/o acute vision loss OD of 2 days duration. Patient reports symptom onset on 12/13, the morning prior to presentation, when she woke up from sleep around 07:45 AM. At that time she noted the lower half of her vision in her right eye was blurry. She reports her vision remained unchanged for the duration of the day, until she went to bed. Upon waking up today, she states the vision in her right eye had worsened, becoming blurry in the entire eye, as well as dark/black in the lower half of the visual field. She reports evaluation by an Ophthalmologist the day prior, shortly after initial symptom onset, and by a retinal specialist today, who informed her she may have "retrobulbar optic neuropathy", prompting her presentation to the ED. She does not report any associated pain with eye movements, diplopia, headache, dizziness, numbness, tingling, weakness, or difficulty with gait. No recent fever, chills, night sweats, myalgias, unintentional weight loss or weight gain, difficulty chewing or swallowing, recent illness, injury, or recent travel. She has never had symptoms like this before. She has a sister with a thyroid disorder, but otherwise no known family history of autoimmune disorders.       During hospital course, MRI brain and orbits showed Mild abnormal enhancement and T2 hyperintensity involving the right optic nerve compatible with optic neuritis. MRI c spine was normal. Opthalmology was consulted and recommended * She was given IV solumedrol that was extended to 7 days and will continued optic neuritis steriod taper as outpatient. Given minimal improvement, patient pursued 5 sessions of PLEX (12/20, 12/22, 12/24, 12/26, 12/28). NMO, MOG neg. After PLEX, shiley was removed and patient was stable for discharge with follow up with Dr Epperson after discussion with neuro attending.      MRI Brain and orbits w/ w/o  Mild abnormal enhancement and T2 hyperintensity involving the right optic   nerve compatible with optic neuritis. Multiple foci of white matter T2 hyperintensity which are nonspecific in   appearance. Demyelinating disease is a prime consideration although other   possibilities are not excluded.    MR Cervical Spine w/wo IV Cont (12.15.22 @ 18:50)   IMPRESSION: No abnormal spinal cord lesions are identified.               27 year-old woman with a PMH of asthma brought in by family to the ED on 12/14/22 with c/o acute vision loss OD of 2 days duration. Patient reports symptom onset on 12/13, the morning prior to presentation, when she woke up from sleep around 07:45 AM.with blurriness of lower half of her vision in her right eye. No associated pain with eye movements, diplopia, headache, dizziness, numbness, tingling, weakness, or difficulty with gait. No recent fever, chills, night sweats, myalgias, unintentional weight loss or weight gain, difficulty chewing or swallowing, recent illness, injury, or recent travel. She has never had symptoms like this before. She has a sister with a thyroid disorder, but otherwise no known family history of autoimmune disorders.  Here, MRI brain and orbits showed Mild abnormal enhancement and T2 hyperintensity involving the right optic nerve compatible with optic neuritis. MRI c spine was normal. Opthalmology was consulted and concerned for optic neuritis. She was given IV solumedrol that was extended to 7 days and will continued optic neuritis steroid taper prednisone 60mg x4 days, 40mg x4 days, 20mg x4 days, then stop. Given minimal improvement, patient pursued 5 sessions of PLEX (12/20, 12/22, 12/24, 12/26, 12/28). NMO, MOG neg. After PLEX, shiley was removed and patient was stable for discharge with follow up with Dr Epperson after discussion with neuro attending.      MRI Brain and orbits w/ w/o  Mild abnormal enhancement and T2 hyperintensity involving the right optic   nerve compatible with optic neuritis. Multiple foci of white matter T2 hyperintensity which are nonspecific in   appearance. Demyelinating disease is a prime consideration although other   possibilities are not excluded.    MR Cervical Spine w/wo IV Cont (12.15.22 @ 18:50)   IMPRESSION: No abnormal spinal cord lesions are identified.

## 2022-12-17 LAB
ANION GAP SERPL CALC-SCNC: 11 MMOL/L — SIGNIFICANT CHANGE UP (ref 5–17)
BUN SERPL-MCNC: 14 MG/DL — SIGNIFICANT CHANGE UP (ref 7–23)
CALCIUM SERPL-MCNC: 9.2 MG/DL — SIGNIFICANT CHANGE UP (ref 8.4–10.5)
CHLORIDE SERPL-SCNC: 103 MMOL/L — SIGNIFICANT CHANGE UP (ref 96–108)
CO2 SERPL-SCNC: 24 MMOL/L — SIGNIFICANT CHANGE UP (ref 22–31)
CREAT SERPL-MCNC: 0.64 MG/DL — SIGNIFICANT CHANGE UP (ref 0.5–1.3)
EGFR: 124 ML/MIN/1.73M2 — SIGNIFICANT CHANGE UP
GLUCOSE BLDC GLUCOMTR-MCNC: 124 MG/DL — HIGH (ref 70–99)
GLUCOSE BLDC GLUCOMTR-MCNC: 192 MG/DL — HIGH (ref 70–99)
GLUCOSE BLDC GLUCOMTR-MCNC: 197 MG/DL — HIGH (ref 70–99)
GLUCOSE BLDC GLUCOMTR-MCNC: 97 MG/DL — SIGNIFICANT CHANGE UP (ref 70–99)
GLUCOSE SERPL-MCNC: 107 MG/DL — HIGH (ref 70–99)
HCT VFR BLD CALC: 40.9 % — SIGNIFICANT CHANGE UP (ref 34.5–45)
HGB BLD-MCNC: 12.5 G/DL — SIGNIFICANT CHANGE UP (ref 11.5–15.5)
MAGNESIUM SERPL-MCNC: 2.3 MG/DL — SIGNIFICANT CHANGE UP (ref 1.6–2.6)
MCHC RBC-ENTMCNC: 25.2 PG — LOW (ref 27–34)
MCHC RBC-ENTMCNC: 30.6 GM/DL — LOW (ref 32–36)
MCV RBC AUTO: 82.3 FL — SIGNIFICANT CHANGE UP (ref 80–100)
NRBC # BLD: 0 /100 WBCS — SIGNIFICANT CHANGE UP (ref 0–0)
PHOSPHATE SERPL-MCNC: 3.4 MG/DL — SIGNIFICANT CHANGE UP (ref 2.5–4.5)
PLATELET # BLD AUTO: 359 K/UL — SIGNIFICANT CHANGE UP (ref 150–400)
POTASSIUM SERPL-MCNC: 4 MMOL/L — SIGNIFICANT CHANGE UP (ref 3.5–5.3)
POTASSIUM SERPL-SCNC: 4 MMOL/L — SIGNIFICANT CHANGE UP (ref 3.5–5.3)
RBC # BLD: 4.97 M/UL — SIGNIFICANT CHANGE UP (ref 3.8–5.2)
RBC # FLD: 14.3 % — SIGNIFICANT CHANGE UP (ref 10.3–14.5)
SODIUM SERPL-SCNC: 138 MMOL/L — SIGNIFICANT CHANGE UP (ref 135–145)
WBC # BLD: 12.07 K/UL — HIGH (ref 3.8–10.5)
WBC # FLD AUTO: 12.07 K/UL — HIGH (ref 3.8–10.5)

## 2022-12-17 PROCEDURE — 99233 SBSQ HOSP IP/OBS HIGH 50: CPT | Mod: GC

## 2022-12-17 RX ORDER — ALPRAZOLAM 0.25 MG
0.5 TABLET ORAL ONCE
Refills: 0 | Status: DISCONTINUED | OUTPATIENT
Start: 2022-12-17 | End: 2022-12-17

## 2022-12-17 RX ORDER — LIDOCAINE HCL 20 MG/ML
40 VIAL (ML) INJECTION ONCE
Refills: 0 | Status: DISCONTINUED | OUTPATIENT
Start: 2022-12-17 | End: 2022-12-29

## 2022-12-17 RX ADMIN — Medication 1: at 11:52

## 2022-12-17 RX ADMIN — Medication 58 MILLIGRAM(S): at 06:21

## 2022-12-17 RX ADMIN — PANTOPRAZOLE SODIUM 40 MILLIGRAM(S): 20 TABLET, DELAYED RELEASE ORAL at 11:39

## 2022-12-17 RX ADMIN — Medication 0.5 MILLIGRAM(S): at 15:53

## 2022-12-17 NOTE — PROGRESS NOTE ADULT - ASSESSMENT
Assessment: 27 year-old woman with a PMH of asthma brought in by family to the ED on 12/14/22 with c/o acute vision loss OD of 2 days duration described as initial blurry vision in the inferior half of the visual field in the right eye, with progression to blurry vision in the entire right eye and darkening of the inferior half of the visual field in the right eye, for which Neurology was consulted. Neuro exam stable with blurred vision on superior quadrants on R eye and R APD    Impression: Acute vision loss OD possibly due to optic neuritis vs optic neuropathy secondary to ischemic vs toxic-metabolic vs infiltrative or compressive etiologies.    Plan:   [x] MRI brain & orbits w/wo contrast - abnormal enhancement in T2 hyperintensity involving R optic nerve compatible with optic neuritis  [x] MRI C and T-Spine w/wo contrast (to evaluate for demyelinating lesions): no abnormal signal  [/] Methylprednisolone 1g IV will be extended to 7 days (started 12/14; re-ordered for 3 more doses starting 12/18), most likely to be started on PLEX on 12/19  [x] SERUM: A1C (5.4), lipid panel (, cholesterol 238), ESR (53), CRP (9), TSH/T4 (wnl), vitamin D 25-OH (wnl), B12 (722), folate (wnl), HAIDER (1:320), Borrelia IgG/IgM (negative), RPR (negative).  [/] SERUM (pending results): NMO ab (pending), MOG (pending), QuantiFeron gold (pending), ANCA (pending), ACE (pending)  [] Will attempt lumbar puncture this weekend  [] Follow-up Ophthalmology recommendations    #Preventive Measures  - ISS & POCT glucose monitoring  - GI ppx with PPI while on high dose steroids  - Telemonitoring, Neurochecks/VS Q4H  - DVT ppx    Case seen and discussed with general neurology attending Dr. Olivera

## 2022-12-17 NOTE — PROGRESS NOTE ADULT - SUBJECTIVE AND OBJECTIVE BOX
SUBJECTIVE/INTERVAL HISTORY:  - Stable R eye blurriness, mostly located on top half of the vision. Bottom half has been mostly dark and blurry since the admission (top portion of vision worsened about day 3 of solumedrol)  - Denies any headache, new visual loss or double vision, chest pain, abdominal pain, numbness/tingling/weakness throughout extremities    PAST MEDICAL & SURGICAL HISTORY:  Asthma        FAMILY HISTORY:    SOCIAL HISTORY:     MEDICATIONS (HOME):  Home Medications:  Ventolin HFA 90 mcg/inh inhalation aerosol:  (14 Dec 2022 17:33)    MEDICATIONS  (STANDING):  dextrose 5%. 1000 milliLiter(s) (100 mL/Hr) IV Continuous <Continuous>  dextrose 5%. 1000 milliLiter(s) (50 mL/Hr) IV Continuous <Continuous>  dextrose 50% Injectable 25 Gram(s) IV Push once  dextrose 50% Injectable 12.5 Gram(s) IV Push once  dextrose 50% Injectable 25 Gram(s) IV Push once  glucagon  Injectable 1 milliGRAM(s) IntraMuscular once  insulin lispro (ADMELOG) corrective regimen sliding scale   SubCutaneous three times a day before meals  insulin lispro (ADMELOG) corrective regimen sliding scale   SubCutaneous at bedtime  lidocaine 1% (Preservative-free) Injectable 40 milliLiter(s) Local Injection once  methylPREDNISolone sodium succinate IVPB 1000 milliGRAM(s) IV Intermittent daily  pantoprazole    Tablet 40 milliGRAM(s) Oral daily    MEDICATIONS  (PRN):  acetaminophen     Tablet .. 650 milliGRAM(s) Oral every 6 hours PRN Temp greater or equal to 38C (100.4F), Mild Pain (1 - 3)  dextrose Oral Gel 15 Gram(s) Oral once PRN Blood Glucose LESS THAN 70 milliGRAM(s)/deciliter    ALLERGIES/INTOLERANCES:  Allergies  No Known Allergies    Intolerances    VITALS & EXAMINATION:  Vital Signs Last 24 Hrs  T(C): 36.6 (17 Dec 2022 05:13), Max: 36.6 (16 Dec 2022 10:02)  T(F): 97.9 (17 Dec 2022 05:13), Max: 97.9 (16 Dec 2022 17:03)  HR: 69 (17 Dec 2022 05:13) (56 - 86)  BP: 92/56 (17 Dec 2022 05:13) (92/56 - 113/75)  BP(mean): --  RR: 18 (17 Dec 2022 05:13) (17 - 18)  SpO2: 98% (17 Dec 2022 05:13) (96% - 98%)    Parameters below as of 17 Dec 2022 05:13  Patient On (Oxygen Delivery Method): room air        General:  Constitutional: Obese Female, appears stated age, in no apparent distress including pain  Head: Normocephalic & atraumatic.    Neurological (>12):  MS: Eyes open. Responds to verbal stimuli. Awake, alert, oriented to person, place, situation, time. Normal affect. Follows all commands.    Language: Speech is clear, fluent with good repetition & comprehension    CNs: R APD, PERRL on L. 3 mm pupil b/l. Blurred vision on top half of visual field throughout, worse on top than bottom. EOMI no nystagmus, no diplopia. V1-3 intact to LT/pinprick, well developed masseter muscles b/l. No facial asymmetry b/l    Motor: Normal muscle bulk & tone. No noticeable tremor or seizure. No pronator drift. Biceps strength somewhat limited due to IV on L            Biceps	   R	5	5	 	  L	4+	5	    	H-Flex D-Flex	P-Flex  R	5	5	5		   L	5	5	5	  	  Sensation: Intact to LT b/l throughout.     Cortical: Extinction on DSS (neglect): none    Reflexes:              Biceps(C5)       BR(C6)     Triceps(C7)               Patellar(L4)    Achilles(S1)    Plantar Resp  R	2	          2	             2		        2		    2		Down   L	2	          2	             2		        2		    2		Down     Coordination: intact rapid-alt movements. No dysmetria to FTN    Gait: Deferred    LABORATORY:  CBC                       12.5   12.07 )-----------( 359      ( 17 Dec 2022 06:22 )             40.9     Chem 12-17    138  |  103  |  14  ----------------------------<  107<H>  4.0   |  24  |  0.64    Ca    9.2      17 Dec 2022 06:19  Phos  3.4     12-17  Mg     2.3     12-17      LFTs   Coagulopathy PT/INR - ( 16 Dec 2022 14:08 )   PT: 13.5 sec;   INR: 1.17 ratio         PTT - ( 16 Dec 2022 14:08 )  PTT:28.3 sec  Lipid Panel 12-15 Chol 238<H> LDL -- HDL 55 Trig 37  A1c   Cardiac enzymes     U/A   CSF  Immunological  Other    STUDIES & IMAGING:  Studies (EKG, EEG, EMG, etc):     Radiology (XR, CT, MR, U/S, TTE/MADELINE):  < from: MR Head w/wo IV Cont (12.15.22 @ 20:26) >  IMPRESSION:  Mild abnormal enhancement and T2 hyperintensity involving the right optic   nerve compatible with optic neuritis.    Multiple foci of white matter T2 hyperintensity which are nonspecific in   appearance. Demyelinating disease is a prime consideration although other   possibilities are not excluded.    --- End of Report ---      < end of copied text >  < from: MR Thoracic Spine w/wo IV Cont (12.15.22 @ 18:50) >  IMPRESSION: No abnormal spinal cord lesions are identified.      < end of copied text >   SUBJECTIVE/INTERVAL HISTORY:  - Stable R eye blurriness, mostly located on top half of the vision. Bottom half has been mostly dark and blurry since the admission (top portion of vision worsened about day 3 of solumedrol)  - Denies any headache, new visual loss or double vision, chest pain, abdominal pain, numbness/tingling/weakness throughout extremities    PAST MEDICAL & SURGICAL HISTORY:  Asthma        FAMILY HISTORY: Non-contributory    SOCIAL HISTORY:     ROS: All systems negative except as documented in Interval History    MEDICATIONS (HOME):  Home Medications:  Ventolin HFA 90 mcg/inh inhalation aerosol:  (14 Dec 2022 17:33)    MEDICATIONS  (STANDING):  dextrose 5%. 1000 milliLiter(s) (100 mL/Hr) IV Continuous <Continuous>  dextrose 5%. 1000 milliLiter(s) (50 mL/Hr) IV Continuous <Continuous>  dextrose 50% Injectable 25 Gram(s) IV Push once  dextrose 50% Injectable 12.5 Gram(s) IV Push once  dextrose 50% Injectable 25 Gram(s) IV Push once  glucagon  Injectable 1 milliGRAM(s) IntraMuscular once  insulin lispro (ADMELOG) corrective regimen sliding scale   SubCutaneous three times a day before meals  insulin lispro (ADMELOG) corrective regimen sliding scale   SubCutaneous at bedtime  lidocaine 1% (Preservative-free) Injectable 40 milliLiter(s) Local Injection once  methylPREDNISolone sodium succinate IVPB 1000 milliGRAM(s) IV Intermittent daily  pantoprazole    Tablet 40 milliGRAM(s) Oral daily    MEDICATIONS  (PRN):  acetaminophen     Tablet .. 650 milliGRAM(s) Oral every 6 hours PRN Temp greater or equal to 38C (100.4F), Mild Pain (1 - 3)  dextrose Oral Gel 15 Gram(s) Oral once PRN Blood Glucose LESS THAN 70 milliGRAM(s)/deciliter    ALLERGIES/INTOLERANCES:  Allergies  No Known Allergies    Intolerances    VITALS & EXAMINATION:  Vital Signs Last 24 Hrs  T(C): 36.6 (17 Dec 2022 05:13), Max: 36.6 (16 Dec 2022 10:02)  T(F): 97.9 (17 Dec 2022 05:13), Max: 97.9 (16 Dec 2022 17:03)  HR: 69 (17 Dec 2022 05:13) (56 - 86)  BP: 92/56 (17 Dec 2022 05:13) (92/56 - 113/75)  BP(mean): --  RR: 18 (17 Dec 2022 05:13) (17 - 18)  SpO2: 98% (17 Dec 2022 05:13) (96% - 98%)    Parameters below as of 17 Dec 2022 05:13  Patient On (Oxygen Delivery Method): room air        General:  Constitutional: Obese Female, appears stated age, in no apparent distress including pain  Head: Normocephalic & atraumatic.    Neurological (>12):  MS: Eyes open. Responds to verbal stimuli. Awake, alert, oriented to person, place, situation, time. Normal affect. Follows all commands.    Language: Speech is clear, fluent with good repetition & comprehension    CNs: R APD, PERRL on L. 3 mm pupil b/l. Blurred vision on top half of visual field throughout, worse on top than bottom. EOMI no nystagmus, no diplopia. V1-3 intact to LT/pinprick, well developed masseter muscles b/l. No facial asymmetry b/l. Hearing intact b/l. Tongue/palate midline. Trap 5/5 b/l    Motor: Normal muscle bulk & tone. No noticeable tremor or seizure. No pronator drift. Biceps strength somewhat limited due to IV on L            Biceps	   R	5	5	 	  L	4+	5	    	H-Flex D-Flex	P-Flex  R	5	5	5		   L	5	5	5	  	  Sensation: Intact to LT b/l throughout.     Cortical: Extinction on DSS (neglect): none    Reflexes:              Biceps(C5)       BR(C6)     Triceps(C7)               Patellar(L4)    Achilles(S1)    Plantar Resp  R	2	          2	             2		        2		    2		Down   L	2	          2	             2		        2		    2		Down     Coordination: intact rapid-alt movements. No dysmetria to FTN    Gait and station: Due to fall risk/safety concerns did not assess    LABORATORY:  CBC                       12.5   12.07 )-----------( 359      ( 17 Dec 2022 06:22 )             40.9     Chem 12-17    138  |  103  |  14  ----------------------------<  107<H>  4.0   |  24  |  0.64    Ca    9.2      17 Dec 2022 06:19  Phos  3.4     12-17  Mg     2.3     12-17      LFTs   Coagulopathy PT/INR - ( 16 Dec 2022 14:08 )   PT: 13.5 sec;   INR: 1.17 ratio         PTT - ( 16 Dec 2022 14:08 )  PTT:28.3 sec  Lipid Panel 12-15 Chol 238<H> LDL -- HDL 55 Trig 37  A1c   Cardiac enzymes     U/A   CSF  Immunological  Other    STUDIES & IMAGING:  Studies (EKG, EEG, EMG, etc):     Radiology (XR, CT, MR, U/S, TTE/MADELINE):  < from: MR Head w/wo IV Cont (12.15.22 @ 20:26) >  IMPRESSION:  Mild abnormal enhancement and T2 hyperintensity involving the right optic   nerve compatible with optic neuritis.    Multiple foci of white matter T2 hyperintensity which are nonspecific in   appearance. Demyelinating disease is a prime consideration although other   possibilities are not excluded.    --- End of Report ---      < end of copied text >  < from: MR Thoracic Spine w/wo IV Cont (12.15.22 @ 18:50) >  IMPRESSION: No abnormal spinal cord lesions are identified.      < end of copied text >

## 2022-12-17 NOTE — PROCEDURE NOTE - NSICDXPROCEDURE_GEN_ALL_CORE_FT
PROCEDURES:  Lumbar puncture 17-Dec-2022 17:12:27 Attempted lumbar puncture at bedside, and usual sterile techniques were used to clean the injection site. Was unsuccessful procedure. Dayna Carter

## 2022-12-17 NOTE — PROGRESS NOTE ADULT - ATTENDING COMMENTS
Interval History as per resident note, personally verified by me. Patient with reportedly worsened vision in R eye since coming here but is has stabilized since yesterday (12/16). No symptoms in L eye and no weakness, sensory changes, or ataxia. Tolerating steroids without incident.    Neurologic exam as per resident note with additions as below:  AAO x3, speech fluent  CN's II-XII intact except for R APD and R eye significantly dec vision (can only see somewhat in superior temporal quadrant)  Strength 5/5 all  Sens intact all  FtN intact b/l  Downgoing b/l plantar response    A/P:  R eye optic neuritis    - Etiology for optic neuritis could be demyelinating (either as isolated event or first attack of multiple sclerosis, NMO, or MOG), infectious, inflammatory, neoplastic, or toxic/metabolic. Her vision has deteriorated since admission but is now stable  - MRI's of neuroaxis unrevealing except for known optic neuritis  - SoluMedrol 1g IV daily for 5-7 days  - Will likely need PLEX on Monday (12/19) with [facility] placement by IR. If vision further deteriorates before then will push for ICU admission to place [facility] and urgent PLEX but for now will observe  - Will attempt LP this weekend - cell count, total protein, glucose, Cx, PCR panel, WNV, myelin basic protein, oligoclonal bands, CSF IgG Index, cytopathology  - Remainder as per resident note

## 2022-12-18 LAB
ANION GAP SERPL CALC-SCNC: 11 MMOL/L — SIGNIFICANT CHANGE UP (ref 5–17)
AUTO DIFF PNL BLD: ABNORMAL
BUN SERPL-MCNC: 18 MG/DL — SIGNIFICANT CHANGE UP (ref 7–23)
C-ANCA SER-ACNC: NEGATIVE — SIGNIFICANT CHANGE UP
CALCIUM SERPL-MCNC: 8.8 MG/DL — SIGNIFICANT CHANGE UP (ref 8.4–10.5)
CHLORIDE SERPL-SCNC: 105 MMOL/L — SIGNIFICANT CHANGE UP (ref 96–108)
CO2 SERPL-SCNC: 26 MMOL/L — SIGNIFICANT CHANGE UP (ref 22–31)
CREAT SERPL-MCNC: 0.67 MG/DL — SIGNIFICANT CHANGE UP (ref 0.5–1.3)
EGFR: 123 ML/MIN/1.73M2 — SIGNIFICANT CHANGE UP
GAMMA INTERFERON BACKGROUND BLD IA-ACNC: 0.03 IU/ML — SIGNIFICANT CHANGE UP
GLUCOSE BLDC GLUCOMTR-MCNC: 104 MG/DL — HIGH (ref 70–99)
GLUCOSE BLDC GLUCOMTR-MCNC: 114 MG/DL — HIGH (ref 70–99)
GLUCOSE BLDC GLUCOMTR-MCNC: 124 MG/DL — HIGH (ref 70–99)
GLUCOSE SERPL-MCNC: 88 MG/DL — SIGNIFICANT CHANGE UP (ref 70–99)
HCT VFR BLD CALC: 40.8 % — SIGNIFICANT CHANGE UP (ref 34.5–45)
HGB BLD-MCNC: 12.8 G/DL — SIGNIFICANT CHANGE UP (ref 11.5–15.5)
M TB IFN-G BLD-IMP: NEGATIVE — SIGNIFICANT CHANGE UP
M TB IFN-G CD4+ BCKGRND COR BLD-ACNC: 0 IU/ML — SIGNIFICANT CHANGE UP
M TB IFN-G CD4+CD8+ BCKGRND COR BLD-ACNC: 0 IU/ML — SIGNIFICANT CHANGE UP
MAGNESIUM SERPL-MCNC: 2.4 MG/DL — SIGNIFICANT CHANGE UP (ref 1.6–2.6)
MCHC RBC-ENTMCNC: 25.9 PG — LOW (ref 27–34)
MCHC RBC-ENTMCNC: 31.4 GM/DL — LOW (ref 32–36)
MCV RBC AUTO: 82.4 FL — SIGNIFICANT CHANGE UP (ref 80–100)
MPO AB + PR3 PNL SER: SIGNIFICANT CHANGE UP
NRBC # BLD: 0 /100 WBCS — SIGNIFICANT CHANGE UP (ref 0–0)
P-ANCA SER-ACNC: NEGATIVE — SIGNIFICANT CHANGE UP
PHOSPHATE SERPL-MCNC: 3.4 MG/DL — SIGNIFICANT CHANGE UP (ref 2.5–4.5)
PLATELET # BLD AUTO: 350 K/UL — SIGNIFICANT CHANGE UP (ref 150–400)
POTASSIUM SERPL-MCNC: 3.8 MMOL/L — SIGNIFICANT CHANGE UP (ref 3.5–5.3)
POTASSIUM SERPL-SCNC: 3.8 MMOL/L — SIGNIFICANT CHANGE UP (ref 3.5–5.3)
QUANT TB PLUS MITOGEN MINUS NIL: 1.49 IU/ML — SIGNIFICANT CHANGE UP
RBC # BLD: 4.95 M/UL — SIGNIFICANT CHANGE UP (ref 3.8–5.2)
RBC # FLD: 14.3 % — SIGNIFICANT CHANGE UP (ref 10.3–14.5)
SODIUM SERPL-SCNC: 142 MMOL/L — SIGNIFICANT CHANGE UP (ref 135–145)
WBC # BLD: 10.62 K/UL — HIGH (ref 3.8–10.5)
WBC # FLD AUTO: 10.62 K/UL — HIGH (ref 3.8–10.5)

## 2022-12-18 PROCEDURE — 99232 SBSQ HOSP IP/OBS MODERATE 35: CPT | Mod: GC

## 2022-12-18 RX ADMIN — Medication 116 MILLIGRAM(S): at 05:30

## 2022-12-18 RX ADMIN — PANTOPRAZOLE SODIUM 40 MILLIGRAM(S): 20 TABLET, DELAYED RELEASE ORAL at 11:49

## 2022-12-18 NOTE — PROGRESS NOTE ADULT - ATTENDING COMMENTS
Interval History as per resident note, personally verified by me. Patient with reportedly worsened vision in R eye since coming here but it has stabilized to slightly improved (5% better) since yesterday (12/17). No symptoms in L eye and no weakness, sensory changes, or ataxia. Tolerating steroids without incident.    Neurologic exam as per resident note with additions as below:  AAO x3, speech fluent  CN's II-XII intact except for mild R APD and R eye significantly dec vision (can only see in superior half and temporal > nasal quadrants circumferentially)  Strength 5/5 all  Sens intact all  FtN intact b/l  Downgoing b/l plantar response    A/P:  R eye optic neuritis  Leukocytosis    - Etiology for optic neuritis could be demyelinating (either as isolated event or first attack of multiple sclerosis, NMO, or MOG), infectious, inflammatory, neoplastic, or toxic/metabolic. Her vision has deteriorated since admission but is now stable  - MRI's of neuroaxis unrevealing except for known optic neuritis  - SoluMedrol 1g IV daily for 7 days, today is 5/7  - Will likely need PLEX on Monday (12/19) with [facility] placement by IR. If vision further deteriorates before then will push for ICU admission to place [facility] and urgent PLEX but for now will observe  - LP attempted at bedside on 12/17 but unsuccessful. Will obtain under IR later this week - cell count, total protein, glucose, Cx, PCR panel, WNV, myelin basic protein, oligoclonal bands, CSF IgG Index, cytopathology  - Remainder as per resident note

## 2022-12-18 NOTE — PROGRESS NOTE ADULT - SUBJECTIVE AND OBJECTIVE BOX
SUBJECTIVE/INTERVAL HISTORY:  - Stable R eye blurriness, mostly located on top half of the vision. Bottom half has been mostly dark and blurry since the admission  - Denies any headache, new visual loss or double vision, chest pain, abdominal pain, numbness/tingling/weakness throughout extremities    PAST MEDICAL & SURGICAL HISTORY:  Asthma        FAMILY HISTORY: Non-contributory    SOCIAL HISTORY:     ROS: All systems negative except as documented in Interval History    MEDICATIONS (HOME):  Home Medications:  Ventolin HFA 90 mcg/inh inhalation aerosol:  (14 Dec 2022 17:33)    MEDICATIONS  (STANDING):  dextrose 5%. 1000 milliLiter(s) (100 mL/Hr) IV Continuous <Continuous>  dextrose 5%. 1000 milliLiter(s) (50 mL/Hr) IV Continuous <Continuous>  dextrose 50% Injectable 25 Gram(s) IV Push once  dextrose 50% Injectable 12.5 Gram(s) IV Push once  dextrose 50% Injectable 25 Gram(s) IV Push once  glucagon  Injectable 1 milliGRAM(s) IntraMuscular once  insulin lispro (ADMELOG) corrective regimen sliding scale   SubCutaneous three times a day before meals  insulin lispro (ADMELOG) corrective regimen sliding scale   SubCutaneous at bedtime  lidocaine 1% (Preservative-free) Injectable 40 milliLiter(s) Local Injection once  methylPREDNISolone sodium succinate IVPB 1000 milliGRAM(s) IV Intermittent daily  pantoprazole    Tablet 40 milliGRAM(s) Oral daily    MEDICATIONS  (PRN):  acetaminophen     Tablet .. 650 milliGRAM(s) Oral every 6 hours PRN Temp greater or equal to 38C (100.4F), Mild Pain (1 - 3)  dextrose Oral Gel 15 Gram(s) Oral once PRN Blood Glucose LESS THAN 70 milliGRAM(s)/deciliter    ALLERGIES/INTOLERANCES:  Allergies  No Known Allergies    Intolerances    VITALS & EXAMINATION:  Vital Signs Last 24 Hrs  T(C): 37 (18 Dec 2022 10:02), Max: 37 (18 Dec 2022 10:02)  T(F): 98.6 (18 Dec 2022 10:02), Max: 98.6 (18 Dec 2022 10:02)  HR: 59 (18 Dec 2022 10:02) (59 - 83)  BP: 110/69 (18 Dec 2022 10:02) (96/58 - 110/69)  BP(mean): --  RR: 18 (18 Dec 2022 10:02) (18 - 18)  SpO2: 96% (18 Dec 2022 10:02) (96% - 99%)    Parameters below as of 18 Dec 2022 10:02  Patient On (Oxygen Delivery Method): room air        General:  Constitutional: Female, appears stated age, in no apparent distress including pain  Head: Normocephalic & atraumatic.    Neurological (>12):  MS: Eyes open. Responds to verbal stimuli. Awake, alert, oriented to person, place, situation, time. Normal affect. Follows all commands.    Language: Speech is clear, fluent    CNs: R APD, PERRL on L. 3 mm pupil b/l. Blurred vision on top half of visual field throughout, worse on top than bottom. EOMI no nystagmus, no diplopia. V1-3 intact to LT. No facial asymmetry b/l. Hearing intact b/l. Tongue/palate midline. Trap 5/5 b/l    Motor: Normal muscle bulk & tone. No noticeable tremor or seizure. No pronator drift. Biceps strength somewhat limited due to IV on L            Biceps	   R	5	5	 	  L	5	5	    	H-Flex D-Flex	P-Flex  R	5	5	5		   L	5	5	5	  	  Sensation: Intact to LT b/l throughout.     Cortical: Extinction on DSS (neglect): none    Reflexes:              Biceps(C5)       BR(C6)     Triceps(C7)               Patellar(L4)    Achilles(S1)    Plantar Resp  R	2	          2	             2		        2		    2		Down   L	2	          2	             2		        2		    2		Down     Coordination: intact rapid-alt movements. No dysmetria to FTN    Gait and station: Due to fall risk/safety concerns did not assess    LABORATORY:                        12.8   10.62 )-----------( 350      ( 18 Dec 2022 07:17 )             40.8       12-18    142  |  105  |  18  ----------------------------<  88  3.8   |  26  |  0.67    Ca    8.8      18 Dec 2022 07:17  Phos  3.4     12-18  Mg     2.4     12-18           PTT - ( 16 Dec 2022 14:08 )  PTT:28.3 sec  Lipid Panel 12-15 Chol 238<H> LDL -- HDL 55 Trig 37  A1c   Cardiac enzymes     U/A   CSF  Immunological  Other    STUDIES & IMAGING:  Studies (EKG, EEG, EMG, etc):     Radiology (XR, CT, MR, U/S, TTE/MADELINE):  < from: MR Head w/wo IV Cont (12.15.22 @ 20:26) >  IMPRESSION:  Mild abnormal enhancement and T2 hyperintensity involving the right optic   nerve compatible with optic neuritis.    Multiple foci of white matter T2 hyperintensity which are nonspecific in   appearance. Demyelinating disease is a prime consideration although other   possibilities are not excluded.    --- End of Report ---      < end of copied text >  < from: MR Thoracic Spine w/wo IV Cont (12.15.22 @ 18:50) >  IMPRESSION: No abnormal spinal cord lesions are identified.      < end of copied text >

## 2022-12-18 NOTE — PROGRESS NOTE ADULT - ASSESSMENT
Assessment: 27 year-old woman with a PMH of asthma brought in by family to the ED on 12/14/22 with c/o acute vision loss OD of 2 days duration described as initial blurry vision in the inferior half of the visual field in the right eye, with progression to blurry vision in the entire right eye and darkening of the inferior half of the visual field in the right eye, for which Neurology was consulted. Neuro exam stable with blurred vision on superior quadrants on R eye and R APD    Impression: Acute vision loss OD possibly due to optic neuritis vs optic neuropathy secondary to ischemic vs toxic-metabolic vs infiltrative or compressive etiologies.    Plan:   [x] MRI brain & orbits w/wo contrast - abnormal enhancement in T2 hyperintensity involving R optic nerve compatible with optic neuritis  [x] MRI C and T-Spine w/wo contrast (to evaluate for demyelinating lesions): no abnormal signal  [/] Methylprednisolone 1g IV will be extended to 7 days (started 12/14; re-ordered for 3 more doses starting 12/18), most likely to be started on PLEX on 12/19. [facility] needs to be contacted. Priority for PLEX over IR-guided LP.   [x] SERUM: A1C (5.4), lipid panel (, cholesterol 238), ESR (53), CRP (9), TSH/T4 (wnl), vitamin D 25-OH (wnl), B12 (722), folate (wnl), HAIDER (1:320), Borrelia IgG/IgM (negative), RPR (negative).  [/] SERUM (pending results): NMO ab (pending), MOG (pending), QuantiFeron gold (pending), ANCA (pending), ACE (pending)  [] Lumbar puncture unsuccessful. Will need IR-guided LP.   [] Follow-up Ophthalmology recommendations    #Preventive Measures  - ISS & POCT glucose monitoring  - GI ppx with PPI while on high dose steroids  - Telemonitoring, Neurochecks/VS Q4H  - DVT ppx    Case seen and discussed with general neurology attending Dr. Olivera

## 2022-12-19 ENCOUNTER — RESULT REVIEW (OUTPATIENT)
Age: 27
End: 2022-12-19

## 2022-12-19 LAB
ALBUMIN SERPL ELPH-MCNC: 3.5 G/DL — SIGNIFICANT CHANGE UP (ref 3.3–5)
ALP SERPL-CCNC: 75 U/L — SIGNIFICANT CHANGE UP (ref 40–120)
ALT FLD-CCNC: 56 U/L — HIGH (ref 10–45)
ANION GAP SERPL CALC-SCNC: 11 MMOL/L — SIGNIFICANT CHANGE UP (ref 5–17)
APPEARANCE CSF: CLEAR — SIGNIFICANT CHANGE UP
APTT BLD: 23.5 SEC — LOW (ref 27.5–35.5)
APTT BLD: 23.8 SEC — LOW (ref 27.5–35.5)
AST SERPL-CCNC: 15 U/L — SIGNIFICANT CHANGE UP (ref 10–40)
BASOPHILS # BLD AUTO: 0.03 K/UL — SIGNIFICANT CHANGE UP (ref 0–0.2)
BASOPHILS NFR BLD AUTO: 0.2 % — SIGNIFICANT CHANGE UP (ref 0–2)
BILIRUB SERPL-MCNC: 0.5 MG/DL — SIGNIFICANT CHANGE UP (ref 0.2–1.2)
BUN SERPL-MCNC: 16 MG/DL — SIGNIFICANT CHANGE UP (ref 7–23)
CA-I BLD-SCNC: 1.16 MMOL/L — SIGNIFICANT CHANGE UP (ref 1.15–1.33)
CALCIUM SERPL-MCNC: 8.8 MG/DL — SIGNIFICANT CHANGE UP (ref 8.4–10.5)
CHLORIDE SERPL-SCNC: 101 MMOL/L — SIGNIFICANT CHANGE UP (ref 96–108)
CO2 SERPL-SCNC: 24 MMOL/L — SIGNIFICANT CHANGE UP (ref 22–31)
COLOR CSF: SIGNIFICANT CHANGE UP
CREAT SERPL-MCNC: 0.68 MG/DL — SIGNIFICANT CHANGE UP (ref 0.5–1.3)
CSF PCR RESULT: SIGNIFICANT CHANGE UP
EGFR: 122 ML/MIN/1.73M2 — SIGNIFICANT CHANGE UP
EOSINOPHIL # BLD AUTO: 0 K/UL — SIGNIFICANT CHANGE UP (ref 0–0.5)
EOSINOPHIL NFR BLD AUTO: 0 % — SIGNIFICANT CHANGE UP (ref 0–6)
FIBRINOGEN PPP-MCNC: 307 MG/DL — SIGNIFICANT CHANGE UP (ref 200–445)
GLUCOSE BLDC GLUCOMTR-MCNC: 117 MG/DL — HIGH (ref 70–99)
GLUCOSE BLDC GLUCOMTR-MCNC: 126 MG/DL — HIGH (ref 70–99)
GLUCOSE BLDC GLUCOMTR-MCNC: 166 MG/DL — HIGH (ref 70–99)
GLUCOSE BLDC GLUCOMTR-MCNC: 98 MG/DL — SIGNIFICANT CHANGE UP (ref 70–99)
GLUCOSE CSF-MCNC: 75 MG/DL — HIGH (ref 40–70)
GLUCOSE SERPL-MCNC: 76 MG/DL — SIGNIFICANT CHANGE UP (ref 70–99)
GRAM STN FLD: SIGNIFICANT CHANGE UP
HCT VFR BLD CALC: 41.1 % — SIGNIFICANT CHANGE UP (ref 34.5–45)
HGB BLD-MCNC: 12.8 G/DL — SIGNIFICANT CHANGE UP (ref 11.5–15.5)
IMM GRANULOCYTES NFR BLD AUTO: 1 % — HIGH (ref 0–0.9)
INR BLD: 1.11 RATIO — SIGNIFICANT CHANGE UP (ref 0.88–1.16)
INR BLD: 1.14 RATIO — SIGNIFICANT CHANGE UP (ref 0.88–1.16)
LDH CSF L TO P-CCNC: <15 U/L — SIGNIFICANT CHANGE UP
LDH FLD-CCNC: <15 U/L — SIGNIFICANT CHANGE UP
LYMPHOCYTES # BLD AUTO: 27.9 % — SIGNIFICANT CHANGE UP (ref 13–44)
LYMPHOCYTES # BLD AUTO: 3.72 K/UL — HIGH (ref 1–3.3)
MAGNESIUM SERPL-MCNC: 2.4 MG/DL — SIGNIFICANT CHANGE UP (ref 1.6–2.6)
MCHC RBC-ENTMCNC: 25.1 PG — LOW (ref 27–34)
MCHC RBC-ENTMCNC: 31.1 GM/DL — LOW (ref 32–36)
MCV RBC AUTO: 80.7 FL — SIGNIFICANT CHANGE UP (ref 80–100)
MONOCYTES # BLD AUTO: 0.82 K/UL — SIGNIFICANT CHANGE UP (ref 0–0.9)
MONOCYTES NFR BLD AUTO: 6.1 % — SIGNIFICANT CHANGE UP (ref 2–14)
NEUTROPHILS # BLD AUTO: 8.63 K/UL — HIGH (ref 1.8–7.4)
NEUTROPHILS # CSF: SIGNIFICANT CHANGE UP (ref 0–6)
NEUTROPHILS NFR BLD AUTO: 64.8 % — SIGNIFICANT CHANGE UP (ref 43–77)
NRBC # BLD: 0 /100 WBCS — SIGNIFICANT CHANGE UP (ref 0–0)
NRBC NFR CSF: <1 /UL — SIGNIFICANT CHANGE UP (ref 0–5)
PHOSPHATE SERPL-MCNC: 4 MG/DL — SIGNIFICANT CHANGE UP (ref 2.5–4.5)
PLATELET # BLD AUTO: 372 K/UL — SIGNIFICANT CHANGE UP (ref 150–400)
POTASSIUM SERPL-MCNC: 4.3 MMOL/L — SIGNIFICANT CHANGE UP (ref 3.5–5.3)
POTASSIUM SERPL-SCNC: 4.3 MMOL/L — SIGNIFICANT CHANGE UP (ref 3.5–5.3)
PROT CSF-MCNC: 28 MG/DL — SIGNIFICANT CHANGE UP (ref 15–45)
PROT SERPL-MCNC: 6.8 G/DL — SIGNIFICANT CHANGE UP (ref 6–8.3)
PROTHROM AB SERPL-ACNC: 12.9 SEC — SIGNIFICANT CHANGE UP (ref 10.5–13.4)
PROTHROM AB SERPL-ACNC: 13.3 SEC — SIGNIFICANT CHANGE UP (ref 10.5–13.4)
RBC # BLD: 5.09 M/UL — SIGNIFICANT CHANGE UP (ref 3.8–5.2)
RBC # CSF: 2 /UL — HIGH (ref 0–0)
RBC # FLD: 13.9 % — SIGNIFICANT CHANGE UP (ref 10.3–14.5)
SODIUM SERPL-SCNC: 136 MMOL/L — SIGNIFICANT CHANGE UP (ref 135–145)
SPECIMEN SOURCE: SIGNIFICANT CHANGE UP
TUBE TYPE: SIGNIFICANT CHANGE UP
WBC # BLD: 13.34 K/UL — HIGH (ref 3.8–10.5)
WBC # FLD AUTO: 13.34 K/UL — HIGH (ref 3.8–10.5)

## 2022-12-19 PROCEDURE — 88108 CYTOPATH CONCENTRATE TECH: CPT | Mod: 26

## 2022-12-19 PROCEDURE — 99233 SBSQ HOSP IP/OBS HIGH 50: CPT

## 2022-12-19 PROCEDURE — 77001 FLUOROGUIDE FOR VEIN DEVICE: CPT | Mod: 26

## 2022-12-19 PROCEDURE — 36556 INSERT NON-TUNNEL CV CATH: CPT

## 2022-12-19 PROCEDURE — 76937 US GUIDE VASCULAR ACCESS: CPT | Mod: 26

## 2022-12-19 PROCEDURE — 36556 INSERT NON-TUNNEL CV CATH: CPT | Mod: RT

## 2022-12-19 PROCEDURE — 62328 DX LMBR SPI PNXR W/FLUOR/CT: CPT

## 2022-12-19 PROCEDURE — ZZZZZ: CPT

## 2022-12-19 RX ORDER — SODIUM CHLORIDE 9 MG/ML
10 INJECTION INTRAMUSCULAR; INTRAVENOUS; SUBCUTANEOUS
Refills: 0 | Status: DISCONTINUED | OUTPATIENT
Start: 2022-12-19 | End: 2022-12-29

## 2022-12-19 RX ORDER — CHLORHEXIDINE GLUCONATE 213 G/1000ML
1 SOLUTION TOPICAL
Refills: 0 | Status: DISCONTINUED | OUTPATIENT
Start: 2022-12-19 | End: 2022-12-29

## 2022-12-19 RX ADMIN — Medication 1: at 14:03

## 2022-12-19 RX ADMIN — Medication 116 MILLIGRAM(S): at 05:47

## 2022-12-19 RX ADMIN — PANTOPRAZOLE SODIUM 40 MILLIGRAM(S): 20 TABLET, DELAYED RELEASE ORAL at 11:54

## 2022-12-19 NOTE — PROCEDURE NOTE - NSPROCDETAILS_GEN_ALL_CORE
guidewire recovered/lumen(s) aspirated and flushed/ultrasound guidance with use of sterile gel and probe cove
location identified, draped/prepped, sterile technique used, needle inserted/introduced/area cleaned in sterile fashion

## 2022-12-19 NOTE — PROCEDURE NOTE - NSINFORMCONSENT_GEN_A_CORE
Consent form in the chart/Benefits, risks, and possible complications of procedure explained to patient/caregiver who verbalized understanding and gave written consent.
Benefits, risks, and possible complications of procedure explained to patient/caregiver who verbalized understanding and gave written consent.
Benefits, risks, and possible complications of procedure explained to patient/caregiver who verbalized understanding and gave verbal consent.

## 2022-12-19 NOTE — PROCEDURE NOTE - ADDITIONAL PROCEDURE DETAILS
S/P fluoroscopically guided lumbar puncture at the L2-L3 level using 20 G X 6 inch spinal needle. pt tolerated the procedure well. Hemostasis secure. CSF specimens were hand delivered to lab for analysis.     Lovenox can be resumed 6 hours after the procedure as was d/w primary team resident Dr. AMADA Chi.

## 2022-12-19 NOTE — PROCEDURE NOTE - NSPOSTCAREGUIDE_GEN_A_CORE
Keep the cast/splint/dressing clean and dry
Keep the cast/splint/dressing clean and dry
Verbal/written post procedure instructions were given to patient/caregiver

## 2022-12-19 NOTE — PRE PROCEDURE NOTE - PRE PROCEDURE EVALUATION
Neuroradiology pre-op note    HPI: 27y Female with Acute vision loss OD possibly due to optic neuritis vs optic neuropathy secondary to ischemic vs toxic-metabolic vs infiltrative or compressive etiologies.    Neurology note from 12/18/2022 appreciated stating, "Etiology for optic neuritis could be demyelinating (either as isolated event or first attack of multiple sclerosis, NMO, or MOG), infectious, inflammatory, neoplastic, or toxic/metabolic. LP attempted at bedside on 12/17 but unsuccessful. Will obtain under IR later this week - cell count, total protein, glucose, Cx, PCR panel, WNV, myelin basic protein, oligoclonal bands, CSF IgG Index, cytopathology."    Diagnosis: optic neuritis, R/O demyelinating disease   Procedure: Fluoroscopically guided lumbar puncture                          12.8   13.34 )-----------( 372      ( 19 Dec 2022 07:26 )             41.1     12-19    136  |  101  |  16  ----------------------------<  76  4.3   |  24  |  0.68    Ca    8.8      19 Dec 2022 07:20  Phos  4.0     12-19  Mg     2.4     12-19    TPro  6.8  /  Alb  3.5  /  TBili  0.5  /  DBili  x   /  AST  15  /  ALT  56<H>  /  AlkPhos  75  12-19    Prothrombin Time and INR, Plasma (12.16.22 @ 14:08)    Prothrombin Time, Plasma: 13.5 sec    INR: 1.17:  Activated Partial Thromboplastin Time (12.16.22 @ 14:08)    Activated Partial Thromboplastin Time: 28.3:     HCG Quantitative, Serum (12.14.22 @ 16:11)    HCG Quantitative, Serum: <2.0:     Flu With COVID-19 By JOSSY (12.14.22 @ 17:18)    SARS-CoV-2 Result: NotDetec:     Influenza A Result: NotDetec    Influenza B Result: NotDetec    Resp Syn Virus Result: NotDetec    Assessment & Plan:  27y Female with acute vision loss OD possibly due to optic neuritis vs optic neuropathy secondary to ischemic vs toxic-metabolic vs infiltrative or compressive etiologies.      -Will plan for fluoroscopically guided lumbar puncture  -Informed consent obtained. After risks, benefits, alternatives discussion pt verbalized understanding and signed consent. Risks including bleeding, infection, nerve damage, and/or headaches were discussed.    GUADALUPE Mendez  Available on [employer] Teams  Spectralink [phone]  Ext [phone]

## 2022-12-19 NOTE — PROGRESS NOTE ADULT - SUBJECTIVE AND OBJECTIVE BOX
Neurology Progress Note    SUBJECTIVE/OBJECTIVE/INTERVAL EVENTS: Patient seen and examined at bedside w/ neuro attending and team.        MEDICATIONS  (STANDING):  dextrose 5%. 1000 milliLiter(s) (100 mL/Hr) IV Continuous <Continuous>  dextrose 5%. 1000 milliLiter(s) (50 mL/Hr) IV Continuous <Continuous>  dextrose 50% Injectable 25 Gram(s) IV Push once  dextrose 50% Injectable 12.5 Gram(s) IV Push once  dextrose 50% Injectable 25 Gram(s) IV Push once  glucagon  Injectable 1 milliGRAM(s) IntraMuscular once  insulin lispro (ADMELOG) corrective regimen sliding scale   SubCutaneous three times a day before meals  insulin lispro (ADMELOG) corrective regimen sliding scale   SubCutaneous at bedtime  lidocaine 1% (Preservative-free) Injectable 40 milliLiter(s) Local Injection once  methylPREDNISolone sodium succinate IVPB 1000 milliGRAM(s) IV Intermittent daily  pantoprazole    Tablet 40 milliGRAM(s) Oral daily    MEDICATIONS  (PRN):  acetaminophen     Tablet .. 650 milliGRAM(s) Oral every 6 hours PRN Temp greater or equal to 38C (100.4F), Mild Pain (1 - 3)  dextrose Oral Gel 15 Gram(s) Oral once PRN Blood Glucose LESS THAN 70 milliGRAM(s)/deciliter      VITALS & EXAMINATION:  Vital Signs Last 24 Hrs  T(C): 36.6 (19 Dec 2022 05:20), Max: 37 (18 Dec 2022 10:02)  T(F): 97.9 (19 Dec 2022 05:20), Max: 98.6 (18 Dec 2022 10:02)  HR: 85 (19 Dec 2022 05:20) (59 - 85)  BP: 105/67 (19 Dec 2022 05:20) (97/62 - 113/70)  BP(mean): --  RR: 18 (19 Dec 2022 05:20) (18 - 18)  SpO2: 97% (19 Dec 2022 05:20) (95% - 97%)    Parameters below as of 19 Dec 2022 05:20  Patient On (Oxygen Delivery Method): room air      LABORATORY:  CBC                       12.8   10.62 )-----------( 350      ( 18 Dec 2022 07:17 )             40.8     Chem 12-18    142  |  105  |  18  ----------------------------<  88  3.8   |  26  |  0.67    Ca    8.8      18 Dec 2022 07:17  Phos  3.4     12-18  Mg     2.4     12-18      LFTs   Coagulopathy   Lipid Panel 12-15 Chol 238<H> LDL -- HDL 55 Trig 37  A1c   Cardiac enzymes     U/A   CSF  Immunological  Other    STUDIES & IMAGING: (EEG, CT, MR, U/S, TTE/MADELINE):        Radiology (XR, CT, MR, U/S, TTE/MADELINE):  < from: MR Head w/wo IV Cont (12.15.22 @ 20:26) >  IMPRESSION:  Mild abnormal enhancement and T2 hyperintensity involving the right optic   nerve compatible with optic neuritis.    Multiple foci of white matter T2 hyperintensity which are nonspecific in   appearance. Demyelinating disease is a prime consideration although other   possibilities are not excluded.    --- End of Report ---      < end of copied text >  < from: MR Thoracic Spine w/wo IV Cont (12.15.22 @ 18:50) >  IMPRESSION: No abnormal spinal cord lesions are identified.      < end of copied text > Neurology Progress Note    SUBJECTIVE/OBJECTIVE/INTERVAL EVENTS: Patient seen and examined at bedside w/ neuro attending and team. Reports mild improvement in R eye blurriness (particularly with more appreciation of color). Otherwise denies  any headache, new visual loss or double vision, chest pain, abdominal pain, numbness/tingling/weakness throughout extremities    MEDICATIONS  (STANDING):  dextrose 5%. 1000 milliLiter(s) (100 mL/Hr) IV Continuous <Continuous>  dextrose 5%. 1000 milliLiter(s) (50 mL/Hr) IV Continuous <Continuous>  dextrose 50% Injectable 25 Gram(s) IV Push once  dextrose 50% Injectable 12.5 Gram(s) IV Push once  dextrose 50% Injectable 25 Gram(s) IV Push once  glucagon  Injectable 1 milliGRAM(s) IntraMuscular once  insulin lispro (ADMELOG) corrective regimen sliding scale   SubCutaneous three times a day before meals  insulin lispro (ADMELOG) corrective regimen sliding scale   SubCutaneous at bedtime  lidocaine 1% (Preservative-free) Injectable 40 milliLiter(s) Local Injection once  methylPREDNISolone sodium succinate IVPB 1000 milliGRAM(s) IV Intermittent daily  pantoprazole    Tablet 40 milliGRAM(s) Oral daily    MEDICATIONS  (PRN):  acetaminophen     Tablet .. 650 milliGRAM(s) Oral every 6 hours PRN Temp greater or equal to 38C (100.4F), Mild Pain (1 - 3)  dextrose Oral Gel 15 Gram(s) Oral once PRN Blood Glucose LESS THAN 70 milliGRAM(s)/deciliter      VITALS & EXAMINATION:  Vital Signs Last 24 Hrs  T(C): 36.6 (19 Dec 2022 05:20), Max: 37 (18 Dec 2022 10:02)  T(F): 97.9 (19 Dec 2022 05:20), Max: 98.6 (18 Dec 2022 10:02)  HR: 85 (19 Dec 2022 05:20) (59 - 85)  BP: 105/67 (19 Dec 2022 05:20) (97/62 - 113/70)  BP(mean): --  RR: 18 (19 Dec 2022 05:20) (18 - 18)  SpO2: 97% (19 Dec 2022 05:20) (95% - 97%)    Parameters below as of 19 Dec 2022 05:20  Patient On (Oxygen Delivery Method): room air        General:  Constitutional: Female, appears stated age, in no apparent distress including pain  Head: Normocephalic & atraumatic.    Neurological (>12):  MS: Eyes open. Responds to verbal stimuli. Awake, alert, oriented to person, place, situation, time. Normal affect. Follows all commands.  Language: Speech is clear, fluent, repetition, comprehension intact    CNs: R APD, PERRL on L. 3 mm pupil b/l. Blurred vision on top half of visual field throughout, worse on top than bottom. EOMI no nystagmus, no diplopia. V1-3 intact to LT. No facial asymmetry b/l. Hearing intact b/l. Tongue/palate midline. Trap 5/5 b/l    Motor: Normal muscle bulk & tone. No noticeable tremor or seizure. No pronator drift. Biceps strength somewhat limited due to IV on L            Biceps	   R	5	5	 	  L	5	5	    	H-Flex D-Flex	P-Flex  R	5	5	5		   L	5	5	5	  	  Sensation: Intact to LT b/l throughout.     Cortical: Extinction on DSS (neglect): none    Reflexes:              Biceps(C5)       BR(C6)     Triceps(C7)               Patellar(L4)    Achilles(S1)    Plantar Resp  R	2	          2	             2		        2		    2		Down   L	2	          2	             2		        2		    2		Down     Coordination: intact rapid-alt movements. No dysmetria to FTN    Gait and station: Due to fall risk/safety concerns did not assess    LABORATORY:  CBC                       12.8   10.62 )-----------( 350      ( 18 Dec 2022 07:17 )             40.8     Chem 12-18    142  |  105  |  18  ----------------------------<  88  3.8   |  26  |  0.67    Ca    8.8      18 Dec 2022 07:17  Phos  3.4     12-18  Mg     2.4     12-18      LFTs   Coagulopathy   Lipid Panel 12-15 Chol 238<H> LDL -- HDL 55 Trig 37  A1c   Cardiac enzymes     U/A   CSF  Immunological  Other    STUDIES & IMAGING: (EEG, CT, MR, U/S, TTE/MADELINE):        Radiology (XR, CT, MR, U/S, TTE/MADELINE):  < from: MR Head w/wo IV Cont (12.15.22 @ 20:26) >  IMPRESSION:  Mild abnormal enhancement and T2 hyperintensity involving the right optic   nerve compatible with optic neuritis.    Multiple foci of white matter T2 hyperintensity which are nonspecific in   appearance. Demyelinating disease is a prime consideration although other   possibilities are not excluded.    --- End of Report ---      < end of copied text >  < from: MR Thoracic Spine w/wo IV Cont (12.15.22 @ 18:50) >  IMPRESSION: No abnormal spinal cord lesions are identified.      < end of copied text >

## 2022-12-19 NOTE — PROGRESS NOTE ADULT - ATTENDING COMMENTS
27 year old woman w/ hx of asthma, admitted for progressive vision loss OD over 1 week and diagnosed with acute optic neuritis based on clinical and MRI findings. She is currently on IVMP, day 6 of 7 today, with mild improvement in vision, now able to appreciate color better and slight dimming in the darkness of her vision.     no recent illness. no recent vaccination. No prior hx of clinical neurological attacks.    Exam significant for RAPD OD and visual acuity still hand movement OD. Red color desaturation OD. EOMI.     CTA H/N: negative  MRI brain w/w/o 12/15/2022- few T2 hyperintense white matter lesions- bilateral frontal lobes and R brachium pontis (these appear ovoid in shape and seems suspicious for demyelination). Non enhancing.   MRI orbits w/w/o 12/15/2022- mild abnormal signal and enhancement involving right optic nerve  MRI C and T spine w/w/o 12/15/2022- no cord lesions    Labs : ACE neg, HAIDER 1:320, ESR 53, CRP 9    Imp: Acute optic neuritis with abnormal brain MRI, possibly clinically isolated syndrome, need to rule out other potential CNS demyelinating etiologies, including NMO, MOG, sarcoid.     Given severity of vision loss, would recommend completing full 7 days of IV solumedrol followed by prednisone taper (60 mg x 4 days, 40 mg x 4 days and then 20 mg x 4 day and then stop).   Judy line to be placed today, will plan for 5 sessions of PLEX  LP planned for today, will follow with labs  Serum MOG and NMO ab send out

## 2022-12-19 NOTE — PROCEDURE NOTE - NSSITEPREP_SKIN_A_CORE
povidone iodine (if allergic to chlorhexidine)
chlorhexidine
Adherence to aseptic technique: hand hygiene prior to donning barriers (gown, gloves), don cap and mask, sterile drape over patient

## 2022-12-19 NOTE — PROGRESS NOTE ADULT - ASSESSMENT
Assessment: 27 year-old woman with a PMH of asthma brought in by family to the ED on 12/14/22 with c/o acute vision loss OD of 2 days duration described as initial blurry vision in the inferior half of the visual field in the right eye, with progression to blurry vision in the entire right eye and darkening of the inferior half of the visual field in the right eye, for which Neurology was consulted. Neuro exam stable with blurred vision on superior quadrants on R eye and R APD. Tentative plan given no improvement for PLEX.    Impression: Acute vision loss OD possibly due to optic neuritis vs optic neuropathy secondary to ischemic vs toxic-metabolic vs infiltrative or compressive etiologies.    Plan:   [x] MRI brain & orbits w/wo contrast - abnormal enhancement in T2 hyperintensity involving R optic nerve compatible with optic neuritis  [x] MRI C and T-Spine w/wo contrast (to evaluate for demyelinating lesions): no abnormal signal  [/] Methylprednisolone 1g IV will be extended to 7 days (started 12/14; re-ordered for 3 more doses starting 12/18), most likely to be started on PLEX on 12/19. Bloodbank needs to be contacted. Priority for PLEX over IR-guided LP.   [ ] consult IR for shiley placement, then blood bank. Draw coags, ionized calcium and fibrinogen prior to PLEX on treatment days  [ ] DVT ppx while getting PLEX  [x] SERUM: A1C (5.4), lipid panel (, cholesterol 238), ESR (53), CRP (9), TSH/T4 (wnl), vitamin D 25-OH (wnl), B12 (722), folate (wnl), HAIDER (1:320), Borrelia IgG/IgM (negative), RPR (negative).  [/] SERUM (pending results): NMO ab (pending), MOG (pending), QuantiFeron gold (pending), ANCA (pending), ACE (pending)  [ ] Lumbar puncture unsuccessful. Will need IR-guided LP.   [ ] Follow-up Ophthalmology recommendations    #Preventive Measures  - ISS & POCT glucose monitoring  - GI ppx with PPI while on high dose steroids  - Telemonitoring, Neurochecks/VS Q4H  - DVT ppx    Case to be seen and discussed with neuro attending Dr Tello on AM rounds. Once discussed, will be attested and recommendations will be finalized.   Assessment: 27 year-old woman with a PMH of asthma brought in by family to the ED on 12/14/22 with c/o acute vision loss OD of 2 days duration described as initial blurry vision in the inferior half of the visual field in the right eye, with progression to blurry vision in the entire right eye and darkening of the inferior half of the visual field in the right eye, for which Neurology was consulted. Neuro exam stable with blurred vision on superior quadrants on R eye and R APD. Tentative plan given no significant improvement for PLEX.    Impression: Acute vision loss OD possibly due to optic neuritis vs optic neuropathy secondary to ischemic vs toxic-metabolic vs infiltrative or compressive etiologies.    Plan:   [x] MRI brain & orbits w/wo contrast - abnormal enhancement in T2 hyperintensity involving R optic nerve compatible with optic neuritis  [x] MRI C and T-Spine w/wo contrast (to evaluate for demyelinating lesions): no abnormal signal  [/] Methylprednisolone 1g IV will be extended to 7 days (started 12/14; re-ordered for 3 more doses starting 12/18), most likely to be started on PLEX on 12/19. Bloodbank needs to be contacted. Priority for PLEX over IR-guided LP.   [ ] Consulting IR for shiley placement, then blood bank. Draw coags, ionized calcium and fibrinogen prior to PLEX on treatment days  [ ] DVT ppx while getting PLEX  [x] SERUM: A1C (5.4), lipid panel (, cholesterol 238), ESR (53), CRP (9), TSH/T4 (wnl), vitamin D 25-OH (wnl), B12 (722), folate (wnl), HAIDER (1:320), Borrelia IgG/IgM (negative), RPR (negative).  [/] SERUM (pending results): NMO ab (pending), MOG (pending), QuantiFeron gold (pending), ANCA (pending), ACE (pending)  [ ] Lumbar puncture obtained with neuro IR.   [ ] Follow-up Ophthalmology recommendations    #Preventive Measures  - ISS & POCT glucose monitoring  - GI ppx with PPI while on high dose steroids  - Telemonitoring, Neurochecks/VS Q4H  - DVT ppx    Patient was seen on morning rounds with attending and neurology team. Plan finalized once signed by attending.

## 2022-12-20 LAB
ALBUMIN CSF-MCNC: 17.8 MG/DL — SIGNIFICANT CHANGE UP (ref 14–25)
ALBUMIN SERPL ELPH-MCNC: 3.7 G/DL — SIGNIFICANT CHANGE UP (ref 3.3–5)
ALBUMIN SERPL ELPH-MCNC: 3733 MG/DL — SIGNIFICANT CHANGE UP (ref 3500–5200)
ALP SERPL-CCNC: 73 U/L — SIGNIFICANT CHANGE UP (ref 40–120)
ALT FLD-CCNC: 68 U/L — HIGH (ref 10–45)
ANION GAP SERPL CALC-SCNC: 10 MMOL/L — SIGNIFICANT CHANGE UP (ref 5–17)
APTT BLD: 23.3 SEC — LOW (ref 27.5–35.5)
AQP4 H2O CHANNEL AB SERPL IA-ACNC: NEGATIVE — SIGNIFICANT CHANGE UP
AST SERPL-CCNC: 15 U/L — SIGNIFICANT CHANGE UP (ref 10–40)
BASOPHILS # BLD AUTO: 0.01 K/UL — SIGNIFICANT CHANGE UP (ref 0–0.2)
BASOPHILS NFR BLD AUTO: 0.1 % — SIGNIFICANT CHANGE UP (ref 0–2)
BILIRUB SERPL-MCNC: 0.7 MG/DL — SIGNIFICANT CHANGE UP (ref 0.2–1.2)
BUN SERPL-MCNC: 17 MG/DL — SIGNIFICANT CHANGE UP (ref 7–23)
CA-I BLD-SCNC: 1.16 MMOL/L — SIGNIFICANT CHANGE UP (ref 1.15–1.33)
CALCIUM SERPL-MCNC: 9.1 MG/DL — SIGNIFICANT CHANGE UP (ref 8.4–10.5)
CHLORIDE SERPL-SCNC: 100 MMOL/L — SIGNIFICANT CHANGE UP (ref 96–108)
CO2 SERPL-SCNC: 26 MMOL/L — SIGNIFICANT CHANGE UP (ref 22–31)
CREAT SERPL-MCNC: 0.63 MG/DL — SIGNIFICANT CHANGE UP (ref 0.5–1.3)
EGFR: 125 ML/MIN/1.73M2 — SIGNIFICANT CHANGE UP
EOSINOPHIL # BLD AUTO: 0 K/UL — SIGNIFICANT CHANGE UP (ref 0–0.5)
EOSINOPHIL NFR BLD AUTO: 0 % — SIGNIFICANT CHANGE UP (ref 0–6)
FIBRINOGEN PPP-MCNC: 271 MG/DL — SIGNIFICANT CHANGE UP (ref 200–445)
GLUCOSE BLDC GLUCOMTR-MCNC: 111 MG/DL — HIGH (ref 70–99)
GLUCOSE BLDC GLUCOMTR-MCNC: 119 MG/DL — HIGH (ref 70–99)
GLUCOSE BLDC GLUCOMTR-MCNC: 134 MG/DL — HIGH (ref 70–99)
GLUCOSE SERPL-MCNC: 90 MG/DL — SIGNIFICANT CHANGE UP (ref 70–99)
HCT VFR BLD CALC: 40.6 % — SIGNIFICANT CHANGE UP (ref 34.5–45)
HGB BLD-MCNC: 13.1 G/DL — SIGNIFICANT CHANGE UP (ref 11.5–15.5)
IGG CSF-MCNC: 4.4 MG/DL — HIGH
IGG FLD-MCNC: 1410 MG/DL — SIGNIFICANT CHANGE UP (ref 610–1660)
IGG SYNTH RATE SER+CSF CALC-MRATE: 1.6 MG/DAY — SIGNIFICANT CHANGE UP
IGG/ALB CLEAR SER+CSF-RTO: 0.7 — SIGNIFICANT CHANGE UP
IGG/ALB CSF: 0.25 RATIO — SIGNIFICANT CHANGE UP
IGG/ALB SER: 0.38 RATIO — SIGNIFICANT CHANGE UP
IMM GRANULOCYTES NFR BLD AUTO: 1.2 % — HIGH (ref 0–0.9)
INR BLD: 1.14 RATIO — SIGNIFICANT CHANGE UP (ref 0.88–1.16)
LYMPHOCYTES # BLD AUTO: 19.6 % — SIGNIFICANT CHANGE UP (ref 13–44)
LYMPHOCYTES # BLD AUTO: 2.47 K/UL — SIGNIFICANT CHANGE UP (ref 1–3.3)
MAGNESIUM SERPL-MCNC: 2.5 MG/DL — SIGNIFICANT CHANGE UP (ref 1.6–2.6)
MCHC RBC-ENTMCNC: 25.7 PG — LOW (ref 27–34)
MCHC RBC-ENTMCNC: 32.3 GM/DL — SIGNIFICANT CHANGE UP (ref 32–36)
MCV RBC AUTO: 79.6 FL — LOW (ref 80–100)
MOG AB SER QL CBA IFA: NEGATIVE — SIGNIFICANT CHANGE UP
MONOCYTES # BLD AUTO: 0.79 K/UL — SIGNIFICANT CHANGE UP (ref 0–0.9)
MONOCYTES NFR BLD AUTO: 6.3 % — SIGNIFICANT CHANGE UP (ref 2–14)
NEUTROPHILS # BLD AUTO: 9.2 K/UL — HIGH (ref 1.8–7.4)
NEUTROPHILS NFR BLD AUTO: 72.8 % — SIGNIFICANT CHANGE UP (ref 43–77)
NON-GYNECOLOGICAL CYTOLOGY STUDY: SIGNIFICANT CHANGE UP
NRBC # BLD: 0 /100 WBCS — SIGNIFICANT CHANGE UP (ref 0–0)
PHOSPHATE SERPL-MCNC: 4.8 MG/DL — HIGH (ref 2.5–4.5)
PLATELET # BLD AUTO: 368 K/UL — SIGNIFICANT CHANGE UP (ref 150–400)
POTASSIUM SERPL-MCNC: 4.3 MMOL/L — SIGNIFICANT CHANGE UP (ref 3.5–5.3)
POTASSIUM SERPL-SCNC: 4.3 MMOL/L — SIGNIFICANT CHANGE UP (ref 3.5–5.3)
PROT SERPL-MCNC: 6.8 G/DL — SIGNIFICANT CHANGE UP (ref 6–8.3)
PROTHROM AB SERPL-ACNC: 13.1 SEC — SIGNIFICANT CHANGE UP (ref 10.5–13.4)
RBC # BLD: 5.1 M/UL — SIGNIFICANT CHANGE UP (ref 3.8–5.2)
RBC # FLD: 13.8 % — SIGNIFICANT CHANGE UP (ref 10.3–14.5)
SODIUM SERPL-SCNC: 136 MMOL/L — SIGNIFICANT CHANGE UP (ref 135–145)
WBC # BLD: 12.62 K/UL — HIGH (ref 3.8–10.5)
WBC # FLD AUTO: 12.62 K/UL — HIGH (ref 3.8–10.5)

## 2022-12-20 PROCEDURE — 36514 APHERESIS PLASMA: CPT

## 2022-12-20 PROCEDURE — 99232 SBSQ HOSP IP/OBS MODERATE 35: CPT

## 2022-12-20 PROCEDURE — 99231 SBSQ HOSP IP/OBS SF/LOW 25: CPT

## 2022-12-20 RX ORDER — ENOXAPARIN SODIUM 100 MG/ML
40 INJECTION SUBCUTANEOUS EVERY 24 HOURS
Refills: 0 | Status: COMPLETED | OUTPATIENT
Start: 2022-12-20 | End: 2022-12-26

## 2022-12-20 RX ADMIN — Medication 116 MILLIGRAM(S): at 06:23

## 2022-12-20 RX ADMIN — PANTOPRAZOLE SODIUM 40 MILLIGRAM(S): 20 TABLET, DELAYED RELEASE ORAL at 12:11

## 2022-12-20 RX ADMIN — ENOXAPARIN SODIUM 40 MILLIGRAM(S): 100 INJECTION SUBCUTANEOUS at 21:59

## 2022-12-20 RX ADMIN — CHLORHEXIDINE GLUCONATE 1 APPLICATION(S): 213 SOLUTION TOPICAL at 17:14

## 2022-12-20 NOTE — PROGRESS NOTE ADULT - SUBJECTIVE AND OBJECTIVE BOX
Interventional Radiology Follow-Up Note.    This is a 27y Female s/p RIJ shiley placement on 12/19 in Interventional Radiology.     Patient seen and examined @ bedside earlier this am ~7. No complaint offered.    Medication:       Vitals:   T(F): 98.2, Max: 98.4 (15:12)  HR: 72  BP: 105/64  RR: 18  SpO2: 99%    Physical Exam:  General: Nontoxic, in NAD.  Neck: right neck HD catheter site dressing c/d/i. Site soft w/o evidence of hematoma noted. nttp      Aspartate Aminotransferase (AST/SGOT): 15 U/L (12-20-22 @ 07:16)  Alanine Aminotransferase (ALT/SGPT): 68 U/L (12-20-22 @ 07:16)  Aspartate Aminotransferase (AST/SGOT): 15 U/L (12-19-22 @ 07:20)  Alanine Aminotransferase (ALT/SGPT): 56 U/L (12-19-22 @ 07:20)        LABS:  Na: 136 (12-20 @ 07:16), 136 (12-19 @ 07:20), 142 (12-18 @ 07:17)  K: 4.3 (12-20 @ 07:16), 4.3 (12-19 @ 07:20), 3.8 (12-18 @ 07:17)  Cl: 100 (12-20 @ 07:16), 101 (12-19 @ 07:20), 105 (12-18 @ 07:17)  CO2: 26 (12-20 @ 07:16), 24 (12-19 @ 07:20), 26 (12-18 @ 07:17)  BUN: 17 (12-20 @ 07:16), 16 (12-19 @ 07:20), 18 (12-18 @ 07:17)  Cr: 0.63 (12-20 @ 07:16), 0.68 (12-19 @ 07:20), 0.67 (12-18 @ 07:17)  Glu: 90(12-20 @ 07:16), 76(12-19 @ 07:20), 88(12-18 @ 07:17)    Hgb: 13.1 (12-20 @ 07:16), 12.8 (12-19 @ 07:26), 12.8 (12-18 @ 07:17)  Hct: 40.6 (12-20 @ 07:16), 41.1 (12-19 @ 07:26), 40.8 (12-18 @ 07:17)  WBC: 12.62 (12-20 @ 07:16), 13.34 (12-19 @ 07:26), 10.62 (12-18 @ 07:17)  Plt: 368 (12-20 @ 07:16), 372 (12-19 @ 07:26), 350 (12-18 @ 07:17)    INR: 1.14 12-20-22 @ 07:16, 1.14 12-19-22 @ 17:18, 1.11 12-19-22 @ 07:18  PTT: 23.3 12-20-22 @ 07:16, 23.8 12-19-22 @ 17:18, 23.5 12-19-22 @ 07:18          LIVER FUNCTIONS - ( 20 Dec 2022 07:16 )  Alb: 3.7 g/dL / Pro: 6.8 g/dL / ALK PHOS: 73 U/L / ALT: 68 U/L / AST: 15 U/L / GGT: x              Assessment/Plan:  27y Female admitted with Unqualified visual loss, right eye, normal vision left eye s/p NITA garza placement for PLEX on 12/19 in Interventional Radiology.      - Okay to use catheter.  -Once patient has completed PLEX and catheter no longer needed/ indicated pls consult IR for removal.   - IR will sign off.   -Global care per primary team.     Please call IR at  [phone] with any questions, concerns, or issues regarding above.    Also available on Teams.

## 2022-12-20 NOTE — PROGRESS NOTE ADULT - ASSESSMENT
27 year-old woman with a PMH of asthma brought in by family to the ED on 12/14/22 with c/o acute vision loss OD of 2 days duration described as initial blurry vision in the inferior half of the visual field in the right eye, with progression to blurry vision in the entire right eye and darkening of the inferior half of the visual field in the right eye, for which Neurology was consulted. Neuro exam stable with blurred vision on superior quadrants on R eye and R APD. S/p neurorads LP 12/19. S/p Shiley placement 12/19 for tentative PLEX 12/20 given minimal improvement.     Impression: Acute vision loss OD possibly due to optic neuritis vs optic neuropathy secondary to likely demyelinating / autoimmune condition.     Plan:   [x] MRI brain & orbits w/wo contrast - abnormal enhancement in T2 hyperintensity involving R optic nerve compatible with optic neuritis  [x] MRI C and T-Spine w/wo contrast (to evaluate for demyelinating lesions): no abnormal signal  [/] Methylprednisolone 1g IV will be extended to 7 days (started 12/14; re-ordered for 3 more doses starting 12/18), most likely to be started on PLEX on 12/19. Bloodbank needs to be contacted. Priority for PLEX over IR-guided LP.   [ ] s/p IR for shiley placement 12/19, tentative plan PLEX w/ blood bank 12/20. Draw coags, ionized calcium and fibrinogen prior to PLEX on treatment days  [x] DVT ppx while getting PLEX  [x] SERUM: A1C (5.4), lipid panel (, cholesterol 238), ESR (53), CRP (9), TSH/T4 (wnl), vitamin D 25-OH (wnl), B12 (722), folate (wnl), HAIDER (1:320), Borrelia IgG/IgM (negative), RPR (negative).  [/] SERUM (pending results): NMO ab (pending), MOG (pending), QuantiFeron gold (pending), ANCA (pending), ACE (pending)  [ ] Lumbar puncture obtained with neuro IR 12/19: clear colorless, TNC <1, glucose 75, protein 28, PCR neg, no growth cltr.   [x] Follow-up Ophthalmology recommendations    #Preventive Measures  - ISS & POCT glucose monitoring  - GI ppx with PPI while on high dose steroids  - Telemonitoring, Neurochecks/VS Q4H  - DVT ppx    Patient will be seen on morning rounds with attending and neurology team. Plan finalized once signed by attending.  27 year-old woman with a PMH of asthma brought in by family to the ED on 12/14/22 with c/o acute vision loss OD of 2 days duration described as initial blurry vision in the inferior half of the visual field in the right eye, with progression to blurry vision in the entire right eye and darkening of the inferior half of the visual field in the right eye, for which Neurology was consulted. Neuro exam stable with blurred vision on superior quadrants on R eye and R APD. S/p neurorads LP 12/19. S/p Shiley placement 12/19 for tentative PLEX 12/20 given minimal improvement.     Impression: Acute vision loss OD possibly due to optic neuritis vs optic neuropathy secondary to likely demyelinating / autoimmune condition.     Plan:   [x] MRI brain & orbits w/wo contrast - abnormal enhancement in T2 hyperintensity involving R optic nerve compatible with optic neuritis  [x] MRI C and T-Spine w/wo contrast (to evaluate for demyelinating lesions): no abnormal signal  [/] Methylprednisolone 1g IV will be extended to 7 days (started 12/14; re-ordered for 3 more doses starting 12/18),    [ ] s/p IR for shiley placement 12/19, tentative plan PLEX w/ blood bank 12/20. Draw coags, ionized calcium and fibrinogen prior to PLEX on treatment days  [x] DVT ppx while getting PLEX  [x] SERUM: A1C (5.4), lipid panel (, cholesterol 238), ESR (53), CRP (9), TSH/T4 (wnl), vitamin D 25-OH (wnl), B12 (722), folate (wnl), HAIDER (1:320), Borrelia IgG/IgM (negative), RPR (negative).  [/] SERUM (pending results): NMO ab (pending), MOG (pending), QuantiFeron gold (pending), ANCA (pending), ACE (pending)  [ ] Lumbar puncture obtained with neuro IR 12/19: clear colorless, TNC <1, glucose 75, protein 28, PCR neg, no growth cltr.   [x] Follow-up Ophthalmology recommendations    #Preventive Measures  - ISS & POCT glucose monitoring  - GI ppx with PPI while on high dose steroids  - Telemonitoring, Neurochecks/VS Q4H  - DVT ppx    Patient will be seen on morning rounds with attending and neurology team. Plan finalized once signed by attending.

## 2022-12-20 NOTE — CONSULT NOTE ADULT - SUBJECTIVE AND OBJECTIVE BOX
[facility] DEPARTMENT OF OPHTHALMOLOGY - INITIAL ADULT CONSULT  -----------------------------------------------------------------------------------------------------------  Sowmya Davis MD PGY-3  -----------------------------------------------------------------------------------------------------------    HPI:  27 year-old woman with a PMH of asthma brought in by family to the ED on 12/14/22 with c/o acute vision loss OD of 2 days duration. Patient reports symptom onset on 12/13, the morning prior to presentation, when she woke up from sleep around 07:45 AM. At that time she noted the lower half of her vision in her right eye was blurry. She reports her vision remained unchanged for the duration of the day, until she went to bed. Upon waking up today, she states the vision in her right eye had worsened, becoming blurry in the entire eye, as well as dark/black in the lower half of the visual field. She reports evaluation by an Ophthalmologist the day prior, shortly after initial symptom onset, and by a retinal specialist today, who informed her she may have "retrobulbar optic neuropathy", prompting her presentation to the ED. She does not report any associated pain with eye movements, diplopia, headache, dizziness, numbness, tingling, weakness, or difficulty with gait. No recent fever, chills, night sweats, myalgias, unintentional weight loss or weight gain, difficulty chewing or swallowing, recent illness, injury, or recent travel. She has never had symptoms like this before. She has a sister with a thyroid disorder, but otherwise no known family history of autoimmune disorders.      (14 Dec 2022 17:14)    Interval History: Pt noticed the bottom of her vision OD was blurry 1 day ago. Denies any inciting events. She went to bed and woke up this morning with darkness in the inferior half of her visual field. Went to see general ophthalmologist Shruti Carson and retina specialist Darian Grissom today. Vision noted to be 20/40-20/50 OD, 20/20 OS. Fundus exam wnl, no signs of RD or RT. OCT RNFL was normal. HVF testing with inferior hemifield defect OD. Normal OS. Pt was sent to ED for urgent imaging. Denies recent illnesses, trauma, or new medications. Has been trying to become pregnant.     PAST MEDICAL & SURGICAL HISTORY:  Asthma        Past Ocular History: denies surg/laser  Ophthalmic Medications: none  FAMILY HISTORY:   no glc/amd  Social History: no etoh/tobacco    MEDICATIONS  (STANDING):  enoxaparin Injectable 40 milliGRAM(s) SubCutaneous every 24 hours  pantoprazole    Tablet 40 milliGRAM(s) Oral daily    MEDICATIONS  (PRN):    Allergies & Intolerances:     Review of Systems:  Constitutional: No fever, chills  Eyes: +blurry vision, Denies flashes, floaters, FBS, erythema, discharge, double vision, OU  Neuro: No tremors  Cardiovascular: No chest pain, palpitations  Respiratory: No SOB, no cough  GI: No nausea, vomiting, abdominal pain  : No dysuria  Skin: no rash  Psych: no depression  Endocrine: no polyuria, polydipsia  Heme/lymph: no swelling    VITALS: T(C): 36.9 (12-14-22 @ 13:29)  T(F): 98.4 (12-14-22 @ 13:29), Max: 98.4 (12-14-22 @ 13:29)  HR: 84 (12-14-22 @ 13:29) (84 - 84)  BP: 156/89 (12-14-22 @ 13:29) (156/89 - 156/89)  RR:  (20 - 20)  SpO2:  (99% - 99%)  Wt(kg): --  General: AAO x 3, appropriate mood and affect    Ophthalmology Exam:  Visual acuity (sc): 20/20 eccentric fixation OD, 20/20 OS  Pupils: PERRL OU,  RAPD OD  Ttono: 10, 11  Extraocular movements (EOMs): Full OU, no pain, no diplopia  Confrontational Visual Field (CVF): inferior constriction OD, full OS  Color Plates: 0/12 OD, 12/12 OS  No red desaturation    Pen Light Exam (PLE)  External: Flat OU  Lids/Lashes/Lacrimal Ducts: Flat OU    Sclera/Conjunctiva: W+Q OU  Cornea: Cl OU  Anterior Chamber: D+F OU    Iris: Flat OU  Lens: Cl OU    Fundus Exam: dilated with 1% tropicamide and 2.5% phenylephrine  Approval obtained from primary team for dilation  Patient aware that pupils can remained dilated for at least 4-6 hours  Exam performed with 20D lens    Vitreous: wnl OU  Disc, cup/disc: sharp and pink, 0.2 OU  Macula: wnl OU  Vessels: wnl OU  Periphery: wnl OU    Labs/Imaging:  < from: CT Head No Cont (12.14.22 @ 17:36) >      IMPRESSION:  CT HEAD: No acute intracranial hemorrhage, midline shift, or   hydrocephalus.    CTA BRAIN: Patent intracranial circulation. No flow-limiting stenosis or   occlusion.    CTA NECK: Patent cervical vasculature. No flow limiting stenosis or   occlusion.      < end of copied text >    
Interventional Radiology    Evaluate for Procedure: Shiley Placement     HPI: 27 year-old woman with a PMH of asthma brought in by family to the ED on 12/14/22 with c/o acute vision loss OD of 2 days duration described as initial blurry vision in the inferior half of the visual field in the right eye, with progression to blurry vision in the entire right eye and darkening of the inferior half of the visual field in the right eye, for which Neurology was consulted. Neuro exam stable with blurred vision on superior quadrants on R eye and R APD. Patient now planned for PLEX therapy, IR consulted for shiley placement.     Allergies:   Medications (Abx/Cardiac/Anticoagulation/Blood Products)    Data:  T(C): 36.6  HR: 94  BP: 112/76  RR: 18  SpO2: 98%    -WBC 13.34 / HgB 12.8 / Hct 41.1 / Plt 372  -Na 136 / Cl 101 / BUN 16 / Glucose 76  -K 4.3 / CO2 24 / Cr 0.68  -ALT 56 / Alk Phos 75 / T.Bili 0.5  -INR 1.11 / PTT 23.5    Radiology:     Assessment/Plan: 27 year-old woman with a PMH of asthma brought in by family to the ED on 12/14/22 with c/o acute vision loss OD of 2 days duration described as initial blurry vision in the inferior half of the visual field in the right eye, with progression to blurry vision in the entire right eye and darkening of the inferior half of the visual field in the right eye, for which Neurology was consulted. Neuro exam stable with blurred vision on superior quadrants on R eye and R APD. Patient now planned for PLEX therapy, IR consulted for shiley placement.     -Will plan for Shiley placement today 12/19/22  -Please place IR procedure order under JEFF duenas to feed pt from IR stand point  -Maintain COVID within 5 days  -Hold a/c x 24-48hrs  -Maintain active T&S  -Above d/w primary team  -Please contact IR at [phone] with any questions/ concerns regarding above.   
27 year-old woman with a PMH of asthma brought in by family to the ED on 12/14/22 with c/o acute vision loss OD of 2 days duration. Patient reports symptom onset on 12/13, the morning prior to presentation, when she woke up from sleep around 07:45 AM. At that time she noted the lower half of her vision in her right eye was blurry. She reports her vision remained unchanged for the duration of the day, until she went to bed. Upon waking up today, she states the vision in her right eye had worsened, becoming blurry in the entire eye, as well as dark/black in the lower half of the visual field. She reports evaluation by an Ophthalmologist the day prior, shortly after initial symptom onset, and by a retinal specialist today, who informed her she may have "retrobulbar optic neuropathy", prompting her presentation to the ED. She does not report any associated pain with eye movements, diplopia, headache, dizziness, numbness, tingling, weakness, or difficulty with gait. No recent fever, chills, night sweats, myalgias, unintentional weight loss or weight gain, difficulty chewing or swallowing, recent illness, injury, or recent travel. She has never had symptoms like this before. She has a sister with a thyroid disorder, but otherwise no known family history of autoimmune disorders.       MRI C and T-Spine w/wo contrast (to evaluate for demyelinating lesions) with no abnormal signal. Methylprednisolone 1g IV started 12/14 for 7 days with mild improvement. Neuro exam stable with blurred vision on superior quadrants on R eye and R APD. Plan for PLEX given no significant improvement. BB contacted to initiate PLEX.     PAST MEDICAL & SURGICAL HISTORY:  Asthma    Allergies    No Known Allergies                        13.1   12.62 )-----------( 368      ( 20 Dec 2022 07:16 )             40.6     Intolerances    MEDICATIONS  (STANDING):  chlorhexidine 4% Liquid 1 Application(s) Topical <User Schedule>  dextrose 5%. 1000 milliLiter(s) (100 mL/Hr) IV Continuous <Continuous>  dextrose 5%. 1000 milliLiter(s) (50 mL/Hr) IV Continuous <Continuous>  dextrose 50% Injectable 25 Gram(s) IV Push once  dextrose 50% Injectable 12.5 Gram(s) IV Push once  dextrose 50% Injectable 25 Gram(s) IV Push once  enoxaparin Injectable 40 milliGRAM(s) SubCutaneous every 24 hours  glucagon  Injectable 1 milliGRAM(s) IntraMuscular once  insulin lispro (ADMELOG) corrective regimen sliding scale   SubCutaneous three times a day before meals  insulin lispro (ADMELOG) corrective regimen sliding scale   SubCutaneous at bedtime  lidocaine 1% (Preservative-free) Injectable 40 milliLiter(s) Local Injection once  pantoprazole    Tablet 40 milliGRAM(s) Oral daily    MEDICATIONS  (PRN):  acetaminophen     Tablet .. 650 milliGRAM(s) Oral every 6 hours PRN Temp greater or equal to 38C (100.4F), Mild Pain (1 - 3)  dextrose Oral Gel 15 Gram(s) Oral once PRN Blood Glucose LESS THAN 70 milliGRAM(s)/deciliter  sodium chloride 0.9% lock flush 10 milliLiter(s) IV Push every 1 hour PRN Pre/post blood products, medications, blood draw, and to maintain line patency    Vital Signs Last 24 Hrs  T(C): 36.8 (20 Dec 2022 09:11), Max: 36.9 (19 Dec 2022 15:12)  T(F): 98.2 (20 Dec 2022 09:11), Max: 98.4 (19 Dec 2022 15:12)  HR: 72 (20 Dec 2022 09:11) (60 - 80)  BP: 105/64 (20 Dec 2022 09:11) (97/64 - 131/76)  BP(mean): --  RR: 18 (20 Dec 2022 09:11) (15 - 18)  SpO2: 99% (20 Dec 2022 09:11) (95% - 99%)    Parameters below as of 20 Dec 2022 09:11  Patient On (Oxygen Delivery Method): room air                           13.1   12.62 )-----------( 368      ( 20 Dec 2022 07:16 )             40.6     12-20    136  |  100  |  17  ----------------------------<  90  4.3   |  26  |  0.63    Ca    9.1      20 Dec 2022 07:16  Phos  4.8     12-20  Mg     2.5     12-20    TPro  6.8  /  Alb  3.7  /  TBili  0.7  /  DBili  x   /  AST  15  /  ALT  68<H>  /  AlkPhos  73  12-20    PT/INR - ( 20 Dec 2022 07:16 )   PT: 13.1 sec;   INR: 1.14 ratio         PTT - ( 20 Dec 2022 07:16 )  PTT:23.3 sec

## 2022-12-20 NOTE — PROGRESS NOTE ADULT - ASSESSMENT
27y female w/ pmhx/ochx of asthma presenting with inferior visual field cut of 1 day duration. Examined by outpatient general ophthalmologist and retina specialist with no abnormal retinal findings. Undergoing workup for optic neuritis OD.     # Optic neuritis right eye  - normal retinal exam with no disc edema - confirmed on outpatient OCT RNFL. Inferior hemifield defect confirmed on outpatient HVF testing.   - MR brain and orbits with enhancement optic nerve and multiple enhancing lesions of white matter - consistent with optic neuritis and demyelinating disease  - atypical workup thus far negative  - NMO and MOG serum Abs pending   - s/p IV steroids with no improvement, s/p 1st dose of PLEX today with improvement from HM to 20/400  - Continue PLEX per neurology  - Will follow    Pt seen with Dr. Carter, attending    Outpatient follow-up: Patient should follow-up with his/her ophthalmologist or with [facility] Department of Ophthalmology at the address below     [address] [place] [address]. [address]  [place], [place] [id number] [phone]

## 2022-12-20 NOTE — PROGRESS NOTE ADULT - SUBJECTIVE AND OBJECTIVE BOX
[facility] DEPARTMENT OF OPHTHALMOLOGY  ------------------------------------------------------------------------------  Jeramie Gallegos MD PGY-3  Pager: [phone], also available on teams  ------------------------------------------------------------------------------    Following for optic neuritis OD  Interval History: No acute events overnight. Pt reports mild improvement in vision, denies pain in EOM    MEDICATIONS  (STANDING):  chlorhexidine 4% Liquid 1 Application(s) Topical <User Schedule>  dextrose 5%. 1000 milliLiter(s) (100 mL/Hr) IV Continuous <Continuous>  dextrose 5%. 1000 milliLiter(s) (50 mL/Hr) IV Continuous <Continuous>  dextrose 50% Injectable 25 Gram(s) IV Push once  dextrose 50% Injectable 12.5 Gram(s) IV Push once  dextrose 50% Injectable 25 Gram(s) IV Push once  enoxaparin Injectable 40 milliGRAM(s) SubCutaneous every 24 hours  glucagon  Injectable 1 milliGRAM(s) IntraMuscular once  insulin lispro (ADMELOG) corrective regimen sliding scale   SubCutaneous three times a day before meals  insulin lispro (ADMELOG) corrective regimen sliding scale   SubCutaneous at bedtime  lidocaine 1% (Preservative-free) Injectable 40 milliLiter(s) Local Injection once  pantoprazole    Tablet 40 milliGRAM(s) Oral daily    MEDICATIONS  (PRN):  acetaminophen     Tablet .. 650 milliGRAM(s) Oral every 6 hours PRN Temp greater or equal to 38C (100.4F), Mild Pain (1 - 3)  dextrose Oral Gel 15 Gram(s) Oral once PRN Blood Glucose LESS THAN 70 milliGRAM(s)/deciliter  sodium chloride 0.9% lock flush 10 milliLiter(s) IV Push every 1 hour PRN Pre/post blood products, medications, blood draw, and to maintain line patency      VITALS: T(C): 36.7 (12-20-22 @ 17:29)  T(F): 98 (12-20-22 @ 17:29), Max: 98.3 (12-20-22 @ 14:04)  HR: 75 (12-20-22 @ 17:29) (60 - 75)  BP: 115/73 (12-20-22 @ 17:29) (101/69 - 115/73)  RR:  (18 - 18)  SpO2:  (95% - 99%)  Wt(kg): --  General: AAO x 3, appropriate mood and affect    Ophthalmology Exam:  Visual acuity (sc): 20/400 OD, 20/20 OS  Pupils: PERRL OU,  RAPD OD  Extraocular movements (EOMs): Full OU, no pain, no diplopia  Confrontational Visual Field (CVF): infero temporal defect OD, full OS    Pen Light Exam (PLE)  External: Flat OU  Lids/Lashes/Lacrimal Ducts: Flat OU    Sclera/Conjunctiva: W+Q OU  Cornea: Cl OU  Anterior Chamber: D+F OU    Iris: Flat OU  Lens: Cl OU

## 2022-12-20 NOTE — PROGRESS NOTE ADULT - SUBJECTIVE AND OBJECTIVE BOX
Neurology Progress Note    SUBJECTIVE/OBJECTIVE/INTERVAL EVENTS:        MEDICATIONS  (STANDING):  chlorhexidine 4% Liquid 1 Application(s) Topical <User Schedule>  dextrose 5%. 1000 milliLiter(s) (100 mL/Hr) IV Continuous <Continuous>  dextrose 5%. 1000 milliLiter(s) (50 mL/Hr) IV Continuous <Continuous>  dextrose 50% Injectable 25 Gram(s) IV Push once  dextrose 50% Injectable 12.5 Gram(s) IV Push once  dextrose 50% Injectable 25 Gram(s) IV Push once  enoxaparin Injectable 40 milliGRAM(s) SubCutaneous every 24 hours  glucagon  Injectable 1 milliGRAM(s) IntraMuscular once  insulin lispro (ADMELOG) corrective regimen sliding scale   SubCutaneous three times a day before meals  insulin lispro (ADMELOG) corrective regimen sliding scale   SubCutaneous at bedtime  lidocaine 1% (Preservative-free) Injectable 40 milliLiter(s) Local Injection once  pantoprazole    Tablet 40 milliGRAM(s) Oral daily    MEDICATIONS  (PRN):  acetaminophen     Tablet .. 650 milliGRAM(s) Oral every 6 hours PRN Temp greater or equal to 38C (100.4F), Mild Pain (1 - 3)  dextrose Oral Gel 15 Gram(s) Oral once PRN Blood Glucose LESS THAN 70 milliGRAM(s)/deciliter  sodium chloride 0.9% lock flush 10 milliLiter(s) IV Push every 1 hour PRN Pre/post blood products, medications, blood draw, and to maintain line patency      VITALS & EXAMINATION:  Vital Signs Last 24 Hrs  T(C): 36.8 (20 Dec 2022 05:15), Max: 36.9 (19 Dec 2022 15:12)  T(F): 98.2 (20 Dec 2022 05:15), Max: 98.4 (19 Dec 2022 15:12)  HR: 60 (20 Dec 2022 05:15) (60 - 94)  BP: 103/65 (20 Dec 2022 05:15) (97/64 - 131/76)  BP(mean): --  RR: 18 (20 Dec 2022 05:15) (15 - 18)  SpO2: 98% (20 Dec 2022 05:15) (95% - 99%)    Parameters below as of 20 Dec 2022 05:15  Patient On (Oxygen Delivery Method): room air           LABORATORY:  CBC                       12.8   13.34 )-----------( 372      ( 19 Dec 2022 07:26 )             41.1     Chem 12-19    136  |  101  |  16  ----------------------------<  76  4.3   |  24  |  0.68    Ca    8.8      19 Dec 2022 07:20  Phos  4.0     12-19  Mg     2.4     12-19    TPro  6.8  /  Alb  3.5  /  TBili  0.5  /  DBili  x   /  AST  15  /  ALT  56<H>  /  AlkPhos  75  12-19    LFTs LIVER FUNCTIONS - ( 19 Dec 2022 07:20 )  Alb: 3.5 g/dL / Pro: 6.8 g/dL / ALK PHOS: 75 U/L / ALT: 56 U/L / AST: 15 U/L / GGT: x           Coagulopathy PT/INR - ( 19 Dec 2022 17:18 )   PT: 13.3 sec;   INR: 1.14 ratio         PTT - ( 19 Dec 2022 17:18 )  PTT:23.8 sec  Lipid Panel 12-15 Chol 238<H> LDL -- HDL 55 Trig 37  A1c   Cardiac enzymes     U/A   CSF  Immunological  Other    STUDIES & IMAGING: (EEG, CT, MR, U/S, TTE/MADELINE):      Radiology (XR, CT, MR, U/S, TTE/MADELINE):  < from: MR Head w/wo IV Cont (12.15.22 @ 20:26) >  IMPRESSION:  Mild abnormal enhancement and T2 hyperintensity involving the right optic   nerve compatible with optic neuritis.    Multiple foci of white matter T2 hyperintensity which are nonspecific in   appearance. Demyelinating disease is a prime consideration although other   possibilities are not excluded.    --- End of Report ---      < end of copied text >  < from: MR Thoracic Spine w/wo IV Cont (12.15.22 @ 18:50) >  IMPRESSION: No abnormal spinal cord lesions are identified.      < end of copied text >   Neurology Progress Note    SUBJECTIVE/OBJECTIVE/INTERVAL EVENTS:  Patient seen and examined at bedside w/ neuro attending and team. Reports overall some improvement in R eye blurriness (particularly with more appreciation of color). Otherwise denies  any headache, new visual loss or double vision, chest pain, abdominal pain, numbness/tingling/weakness throughout extremities. S/p neurorads LP and Shiley placement. Plan for PLEX today.     MEDICATIONS  (STANDING):  chlorhexidine 4% Liquid 1 Application(s) Topical <User Schedule>  dextrose 5%. 1000 milliLiter(s) (100 mL/Hr) IV Continuous <Continuous>  dextrose 5%. 1000 milliLiter(s) (50 mL/Hr) IV Continuous <Continuous>  dextrose 50% Injectable 25 Gram(s) IV Push once  dextrose 50% Injectable 12.5 Gram(s) IV Push once  dextrose 50% Injectable 25 Gram(s) IV Push once  enoxaparin Injectable 40 milliGRAM(s) SubCutaneous every 24 hours  glucagon  Injectable 1 milliGRAM(s) IntraMuscular once  insulin lispro (ADMELOG) corrective regimen sliding scale   SubCutaneous three times a day before meals  insulin lispro (ADMELOG) corrective regimen sliding scale   SubCutaneous at bedtime  lidocaine 1% (Preservative-free) Injectable 40 milliLiter(s) Local Injection once  pantoprazole    Tablet 40 milliGRAM(s) Oral daily    MEDICATIONS  (PRN):  acetaminophen     Tablet .. 650 milliGRAM(s) Oral every 6 hours PRN Temp greater or equal to 38C (100.4F), Mild Pain (1 - 3)  dextrose Oral Gel 15 Gram(s) Oral once PRN Blood Glucose LESS THAN 70 milliGRAM(s)/deciliter  sodium chloride 0.9% lock flush 10 milliLiter(s) IV Push every 1 hour PRN Pre/post blood products, medications, blood draw, and to maintain line patency      VITALS & EXAMINATION:  Vital Signs Last 24 Hrs  T(C): 36.8 (20 Dec 2022 05:15), Max: 36.9 (19 Dec 2022 15:12)  T(F): 98.2 (20 Dec 2022 05:15), Max: 98.4 (19 Dec 2022 15:12)  HR: 60 (20 Dec 2022 05:15) (60 - 94)  BP: 103/65 (20 Dec 2022 05:15) (97/64 - 131/76)  BP(mean): --  RR: 18 (20 Dec 2022 05:15) (15 - 18)  SpO2: 98% (20 Dec 2022 05:15) (95% - 99%)    Parameters below as of 20 Dec 2022 05:15  Patient On (Oxygen Delivery Method): room air    General:  Constitutional: Female, appears stated age, in no apparent distress including pain  Head: Normocephalic & atraumatic.  Resp: breathing regularly.    Neurological (>12):  MS: Eyes open. Responds to verbal stimuli. Awake, alert, oriented to person, place, situation, time. Normal affect. Follows all commands.  Language: Speech is clear, fluent, repetition, comprehension intact    CNs: R APD, PERRL on L. 3 mm pupil b/l. Blurred vision on top half of visual field throughout, worse on top than bottom. Upper vision 20/200. Cannot count fingers. EOMI no nystagmus, no diplopia. V1-3 intact to LT. No facial asymmetry b/l. Hearing intact b/l. Tongue/palate midline. Trap 5/5 b/l  Motor: Normal muscle bulk & tone. No noticeable tremor or seizure. No pronator drift. Biceps strength somewhat limited due to IV on L            Biceps	   R	5	5	 	  L	5	5	    	H-Flex D-Flex	P-Flex  R	5	5	5		   L	5	5	5	  	  Sensation: Intact to LT b/l throughout.     Cortical: Extinction on DSS (neglect): none    Reflexes:              Biceps(C5)       BR(C6)     Triceps(C7)               Patellar(L4)    Achilles(S1)    Plantar Resp  R	2	          2	             2		        2		    2		Down   L	2	          2	             2		        2		    2		Down     Coordination: intact rapid-alt movements. No dysmetria to FTN    Gait and station: Due to fall risk/safety concerns did not assess     LABORATORY:  CBC                       12.8   13.34 )-----------( 372      ( 19 Dec 2022 07:26 )             41.1     Chem 12-19    136  |  101  |  16  ----------------------------<  76  4.3   |  24  |  0.68    Ca    8.8      19 Dec 2022 07:20  Phos  4.0     12-19  Mg     2.4     12-19    TPro  6.8  /  Alb  3.5  /  TBili  0.5  /  DBili  x   /  AST  15  /  ALT  56<H>  /  AlkPhos  75  12-19    LFTs LIVER FUNCTIONS - ( 19 Dec 2022 07:20 )  Alb: 3.5 g/dL / Pro: 6.8 g/dL / ALK PHOS: 75 U/L / ALT: 56 U/L / AST: 15 U/L / GGT: x           Coagulopathy PT/INR - ( 19 Dec 2022 17:18 )   PT: 13.3 sec;   INR: 1.14 ratio         PTT - ( 19 Dec 2022 17:18 )  PTT:23.8 sec  Lipid Panel 12-15 Chol 238<H> LDL -- HDL 55 Trig 37  A1c   Cardiac enzymes     U/A   CSF  Immunological  Other    STUDIES & IMAGING: (EEG, CT, MR, U/S, TTE/MADELINE):      Radiology (XR, CT, MR, U/S, TTE/MADELINE):  < from: MR Head w/wo IV Cont (12.15.22 @ 20:26) >  IMPRESSION:  Mild abnormal enhancement and T2 hyperintensity involving the right optic   nerve compatible with optic neuritis.    Multiple foci of white matter T2 hyperintensity which are nonspecific in   appearance. Demyelinating disease is a prime consideration although other   possibilities are not excluded.    --- End of Report ---      < end of copied text >  < from: MR Thoracic Spine w/wo IV Cont (12.15.22 @ 18:50) >  IMPRESSION: No abnormal spinal cord lesions are identified.      < end of copied text >

## 2022-12-20 NOTE — PROGRESS NOTE ADULT - ATTENDING COMMENTS
27 yoF with optic neuritis OD. No improvement with a week of steroids. S/p first session of PLEX. Pt with improvement in vision today. Continue PLEX therapy. F/u pending labs. Remainder of plan as above.

## 2022-12-20 NOTE — PROGRESS NOTE ADULT - ATTENDING COMMENTS
27 year old woman w/ hx of asthma, admitted for progressive vision loss OD over 1 week and diagnosed with acute optic neuritis based on clinical and MRI findings, s/p 7 days of IV solumedrol with mild improvement in symptoms.      no recent illness. no recent vaccination. No prior hx of clinical neurological attacks.      CTA H/N: negative  MRI brain w/w/o 12/15/2022- few T2 hyperintense white matter lesions- bilateral frontal lobes and R brachium pontis (these appear ovoid in shape and seems suspicious for demyelination). Non enhancing.   MRI orbits w/w/o 12/15/2022- mild abnormal signal and enhancement involving right optic nerve  MRI C and T spine w/w/o 12/15/2022- no cord lesions    Labs : ACE neg, HAIDER 1:320, ESR 53, CRP 9, Lyme neg, ANCA neg.           Serum MOG ab negative. NMO ab serum negative.   LP 12/19: < 1 cell, protein 28, PCR negative. no culture growth. OCB pending.     s/o: She reports she is 15 % better, still able to see better superiorly out of right eye, but otherwise still hand motion. She reports the blurriness is better and make out colors better. Completed 7 days of IV solumedrol today. Spinal tap completed yesterday. Dajaley placed yesterday, will plan to start PLEX, likely today. Exam significant for RAPD OD and visual acuity still hand movement OD, but able to count fingers and 20/200 in superior quadrant. VA 20/20 OS. Red color desaturation OD. EOMI.     Imp: Acute optic neuritis OD with abnormal brain MRI- most consistent with clinically isolated syndrome at this time.     Given severity of vision loss, pt completed a full 7 days of IV solumedrol (last day 12/20), will now follow by prednisone taper (60 mg x 4 days, 40 mg x 4 days and then 20 mg x 4 day and then stop).   Judy line placed 12/19, will plan for 5 sessions of PLEX (every other day)  Will follow up on CSF results, particulary OCB    Dispo: discharge home after completion of PLEX. Will follow up with me as outpatient

## 2022-12-21 LAB
ALBUMIN SERPL ELPH-MCNC: 4 G/DL — SIGNIFICANT CHANGE UP (ref 3.3–5)
ALP SERPL-CCNC: 39 U/L — LOW (ref 40–120)
ALT FLD-CCNC: 87 U/L — HIGH (ref 10–45)
ANION GAP SERPL CALC-SCNC: 9 MMOL/L — SIGNIFICANT CHANGE UP (ref 5–17)
AST SERPL-CCNC: 20 U/L — SIGNIFICANT CHANGE UP (ref 10–40)
BASOPHILS # BLD AUTO: 0 K/UL — SIGNIFICANT CHANGE UP (ref 0–0.2)
BASOPHILS NFR BLD AUTO: 0 % — SIGNIFICANT CHANGE UP (ref 0–2)
BILIRUB SERPL-MCNC: 0.8 MG/DL — SIGNIFICANT CHANGE UP (ref 0.2–1.2)
BUN SERPL-MCNC: 15 MG/DL — SIGNIFICANT CHANGE UP (ref 7–23)
BURR CELLS BLD QL SMEAR: SLIGHT — SIGNIFICANT CHANGE UP
CALCIUM SERPL-MCNC: 8.7 MG/DL — SIGNIFICANT CHANGE UP (ref 8.4–10.5)
CHLORIDE SERPL-SCNC: 103 MMOL/L — SIGNIFICANT CHANGE UP (ref 96–108)
CO2 SERPL-SCNC: 27 MMOL/L — SIGNIFICANT CHANGE UP (ref 22–31)
CREAT SERPL-MCNC: 0.65 MG/DL — SIGNIFICANT CHANGE UP (ref 0.5–1.3)
DACRYOCYTES BLD QL SMEAR: SLIGHT — SIGNIFICANT CHANGE UP
EGFR: 124 ML/MIN/1.73M2 — SIGNIFICANT CHANGE UP
EOSINOPHIL # BLD AUTO: 0 K/UL — SIGNIFICANT CHANGE UP (ref 0–0.5)
EOSINOPHIL NFR BLD AUTO: 0 % — SIGNIFICANT CHANGE UP (ref 0–6)
GLUCOSE BLDC GLUCOMTR-MCNC: 104 MG/DL — HIGH (ref 70–99)
GLUCOSE BLDC GLUCOMTR-MCNC: 128 MG/DL — HIGH (ref 70–99)
GLUCOSE BLDC GLUCOMTR-MCNC: 82 MG/DL — SIGNIFICANT CHANGE UP (ref 70–99)
GLUCOSE BLDC GLUCOMTR-MCNC: 92 MG/DL — SIGNIFICANT CHANGE UP (ref 70–99)
GLUCOSE SERPL-MCNC: 83 MG/DL — SIGNIFICANT CHANGE UP (ref 70–99)
HCT VFR BLD CALC: 40.4 % — SIGNIFICANT CHANGE UP (ref 34.5–45)
HGB BLD-MCNC: 13 G/DL — SIGNIFICANT CHANGE UP (ref 11.5–15.5)
LYMPHOCYTES # BLD AUTO: 20.4 % — SIGNIFICANT CHANGE UP (ref 13–44)
LYMPHOCYTES # BLD AUTO: 4.47 K/UL — HIGH (ref 1–3.3)
MAGNESIUM SERPL-MCNC: 2.3 MG/DL — SIGNIFICANT CHANGE UP (ref 1.6–2.6)
MANUAL SMEAR VERIFICATION: SIGNIFICANT CHANGE UP
MCHC RBC-ENTMCNC: 25.7 PG — LOW (ref 27–34)
MCHC RBC-ENTMCNC: 32.2 GM/DL — SIGNIFICANT CHANGE UP (ref 32–36)
MCV RBC AUTO: 79.8 FL — LOW (ref 80–100)
MONOCYTES # BLD AUTO: 0.92 K/UL — HIGH (ref 0–0.9)
MONOCYTES NFR BLD AUTO: 4.2 % — SIGNIFICANT CHANGE UP (ref 2–14)
NEUTROPHILS # BLD AUTO: 16.53 K/UL — HIGH (ref 1.8–7.4)
NEUTROPHILS NFR BLD AUTO: 75.4 % — SIGNIFICANT CHANGE UP (ref 43–77)
PHOSPHATE SERPL-MCNC: 3.7 MG/DL — SIGNIFICANT CHANGE UP (ref 2.5–4.5)
PLAT MORPH BLD: NORMAL — SIGNIFICANT CHANGE UP
PLATELET # BLD AUTO: 312 K/UL — SIGNIFICANT CHANGE UP (ref 150–400)
POIKILOCYTOSIS BLD QL AUTO: SLIGHT — SIGNIFICANT CHANGE UP
POTASSIUM SERPL-MCNC: 4.3 MMOL/L — SIGNIFICANT CHANGE UP (ref 3.5–5.3)
POTASSIUM SERPL-SCNC: 4.3 MMOL/L — SIGNIFICANT CHANGE UP (ref 3.5–5.3)
PROT SERPL-MCNC: 5.1 G/DL — LOW (ref 6–8.3)
RBC # BLD: 5.06 M/UL — SIGNIFICANT CHANGE UP (ref 3.8–5.2)
RBC # FLD: 14.2 % — SIGNIFICANT CHANGE UP (ref 10.3–14.5)
RBC BLD AUTO: ABNORMAL
SODIUM SERPL-SCNC: 139 MMOL/L — SIGNIFICANT CHANGE UP (ref 135–145)
TARGETS BLD QL SMEAR: SLIGHT — SIGNIFICANT CHANGE UP
WBC # BLD: 21.92 K/UL — HIGH (ref 3.8–10.5)
WBC # FLD AUTO: 21.92 K/UL — HIGH (ref 3.8–10.5)

## 2022-12-21 PROCEDURE — 99232 SBSQ HOSP IP/OBS MODERATE 35: CPT

## 2022-12-21 RX ORDER — CHOLECALCIFEROL (VITAMIN D3) 125 MCG
2000 CAPSULE ORAL DAILY
Refills: 0 | Status: DISCONTINUED | OUTPATIENT
Start: 2022-12-21 | End: 2022-12-29

## 2022-12-21 RX ADMIN — Medication 60 MILLIGRAM(S): at 06:37

## 2022-12-21 RX ADMIN — PANTOPRAZOLE SODIUM 40 MILLIGRAM(S): 20 TABLET, DELAYED RELEASE ORAL at 13:25

## 2022-12-21 RX ADMIN — Medication 2000 UNIT(S): at 13:24

## 2022-12-21 RX ADMIN — ENOXAPARIN SODIUM 40 MILLIGRAM(S): 100 INJECTION SUBCUTANEOUS at 21:07

## 2022-12-21 NOTE — PROGRESS NOTE ADULT - ASSESSMENT
27 year-old woman with a PMH of asthma brought in by family to the ED on 12/14/22 with c/o acute vision loss OD of 2 days duration described as initial blurry vision in the inferior half of the visual field in the right eye, with progression to blurry vision in the entire right eye and darkening of the inferior half of the visual field in the right eye, for which Neurology was consulted. Neuro exam stable with blurred vision on superior quadrants on R eye and R APD. S/p neurorads LP 12/19. S/p Shiley placement 12/19 for tentative PLEX 12/20 given minimal improvement.     Impression: Acute vision loss OD possibly due to optic neuritis vs optic neuropathy secondary to likely demyelinating / autoimmune condition.     Plan:   [x] MRI brain & orbits w/wo contrast - abnormal enhancement in T2 hyperintensity involving R optic nerve compatible with optic neuritis  [x] MRI C and T-Spine w/wo contrast (to evaluate for demyelinating lesions): no abnormal signal  [/] Methylprednisolone 1g IV will be extended to 7 days (started 12/14; re-ordered for 3 more doses starting 12/18),    [ ] s/p IR for shiley placement 12/19, tentative plan PLEX w/ blood bank 12/20. Draw coags, ionized calcium and fibrinogen prior to PLEX on treatment days  [x] DVT ppx while getting PLEX  [x] SERUM: A1C (5.4), lipid panel (, cholesterol 238), ESR (53), CRP (9), TSH/T4 (wnl), vitamin D 25-OH (wnl), B12 (722), folate (wnl), HAIDER (1:320), Borrelia IgG/IgM (negative), RPR (negative).  [/] SERUM (pending results): NMO ab (pending), MOG (pending), QuantiFeron gold (pending), ANCA (pending), ACE (pending)  [ ] Lumbar puncture obtained with neuro IR 12/19: clear colorless, TNC <1, glucose 75, protein 28, PCR neg, no growth cltr.   [x] Follow-up Ophthalmology recommendations    #Preventive Measures  - ISS & POCT glucose monitoring  - GI ppx with PPI while on high dose steroids  - Telemonitoring, Neurochecks/VS Q4H  - DVT ppx    Patient will be seen on morning rounds with attending and neurology team. Plan finalized once signed by attending.    Assessment: 27 year-old woman with a PMH of asthma presented on 12/14/22 with c/o acute vision loss OD of 2 days duration described as initial blurry vision in the inferior half of the visual field in the right eye, with progression to blurry vision in the entire right eye and darkening of the inferior half of the visual field in the right eye, for which Neurology was consulted. Neuro exam stable with blurred vision on superior quadrants on R eye and R APD. Completed solumedrol 1g IV extended to 7 days (12/14 - 12/20). S/p neurorads LP 12/19. S/p Shiley placement 12/19. PLEX started 12/20.     Impression: Improving vision loss OD possibly due to optic neuritis vs optic neuropathy secondary to likely demyelinating / autoimmune condition.     Plan:   [x] MRI brain & orbits w/wo contrast - abnormal enhancement in T2 hyperintensity involving R optic nerve compatible with optic neuritis  [x] MRI C and T-Spine w/wo contrast (to evaluate for demyelinating lesions): no abnormal signal  [x] solmedrol 7 days (12/14 - 12/20)  [x ] s/p IR for shiley placement 12/19, PLEX w/ blood bank 12/20. Draw coags, ionized calcium and fibrinogen prior to PLEX on treatment days  [x] DVT ppx while getting PLEX  [x] SERUM: A1C (5.4), lipid panel (, cholesterol 238), ESR (53), CRP (9), TSH/T4 (wnl), vitamin D 25-OH (wnl), B12 (722), folate (wnl), HAIDER (1:320), Borrelia IgG/IgM (negative), RPR (negative).  NMO ab (-), MOG (-), QuantiFeron gold (-), ANCA (-), ACE (-)  [x ] Lumbar puncture obtained with neuro IR 12/19: clear colorless, TNC <1, glucose 75, protein 28, PCR neg, no growth cltr.   [x] Follow-up Ophthalmology recommendations, normal retinal exam with no disc edema    #Preventive Measures  - ISS & POCT glucose monitoring  - GI ppx with PPI while on high dose steroids  - Telemonitoring, Neurochecks/VS Q4H  - DVT ppx    Patient will be seen on morning rounds with attending and neurology team. Plan finalized once signed by attending.    Assessment: 27 year-old woman with a PMH of asthma presented on 12/14/22 with c/o acute vision loss OD of 2 days duration described as initial blurry vision in the inferior half of the visual field in the right eye, with progression to blurry vision in the entire right eye and darkening of the inferior half of the visual field in the right eye, for which Neurology was consulted. Neuro exam stable with blurred vision on superior quadrants on R eye and R APD. Completed solumedrol 1g IV extended to 7 days (12/14 - 12/20). S/p neurorads LP 12/19. S/p Shiley placement 12/19. PLEX started 12/20.     Impression: Improving vision loss OD possibly due to optic neuritis vs optic neuropathy secondary to likely demyelinating / autoimmune condition.     Plan:   [x] MRI brain & orbits w/wo contrast - abnormal enhancement in T2 hyperintensity involving R optic nerve compatible with optic neuritis  [x] MRI C and T-Spine w/wo contrast - unremarkable  [x] solmedrol 7 days (12/14 - 12/20)  [x ] s/p IR for shiley placement 12/19,   [/] PLEX (da1/5 complete, started on 12/20). Day 2 tomorrow, please draw coags, ionized calcium and fibrinogen prior to PLEX on treatment days  [x] DVT ppx while getting PLEX  [x] SERUM: A1C (5.4), lipid panel (, cholesterol 238), ESR (53), CRP (9), TSH/T4 (wnl), vitamin D 25-OH (wnl), B12 (722), folate (wnl), HAIDER (1:320), Borrelia IgG/IgM (negative), RPR (negative).  NMO ab (-), MOG (-), QuantiFeron gold (-), ANCA (-), ACE (-)  [x ] Lumbar puncture obtained with neuro IR 12/19: clear colorless, TNC <1, glucose 75, protein 28, PCR neg, no growth   [x] Follow-up Ophthalmology recommendations, normal retinal exam with no disc edema    #Preventive Measures  - ISS & POCT glucose monitoring  - GI ppx with PPI while on high dose steroids  - Telemonitoring, Neurochecks/VS Q4H  - DVT ppx    Patient will be seen on morning rounds with attending and neurology team. Plan finalized once signed by attending.    Assessment: 27 year-old woman with a PMH of asthma presented on 12/14/22 with c/o acute vision loss OD of 2 days duration described as initial blurry vision in the inferior half of the visual field in the right eye, with progression to blurry vision in the entire right eye and darkening of the inferior half of the visual field in the right eye, for which Neurology was consulted. Neuro exam stable with blurred vision on superior quadrants on R eye and R APD. Completed solumedrol 1g IV extended to 7 days (12/14 - 12/20). S/p neurorads LP 12/19. S/p Shiley placement 12/19. PLEX started 12/20.     Impression: Improving vision loss OD possibly due to optic neuritis vs optic neuropathy secondary to likely demyelinating / autoimmune condition.     Plan:   [x] MRI brain & orbits w/wo contrast - abnormal enhancement in T2 hyperintensity involving R optic nerve compatible with optic neuritis  [x] MRI C and T-Spine w/wo contrast - unremarkable  [x] solmedrol 7 days (12/14 - 12/20)  [x ] s/p IR for shiley placement 12/19,   [/] PLEX (da1/5 complete, started on 12/20). Day 2 tomorrow, please draw coags, ionized calcium and fibrinogen prior to PLEX on treatment days  [x] DVT ppx while getting PLEX  [x] SERUM: A1C (5.4), lipid panel (, cholesterol 238), ESR (53), CRP (9), TSH/T4 (wnl), vitamin D 25-OH (wnl), B12 (722), folate (wnl), HAIDER (1:320), Borrelia IgG/IgM (negative), RPR (negative).  NMO ab (-), MOG (-), QuantiFeron gold (-), ANCA (-), ACE (-)  [x ] Lumbar puncture obtained with neuro IR 12/19: clear colorless, TNC <1, glucose 75, protein 28, PCR neg, no growth   [x] Follow-up Ophthalmology recommendations, normal retinal exam with no disc edema  [/] Hep panel, quant gold (-), NICOLE virus   [] vit D 2000 IU daily    #Preventive Measures  - ISS & POCT glucose monitoring  - GI ppx with PPI while on high dose steroids  - Telemonitoring, Neurochecks/VS Q4H  - DVT ppx    Patient will be seen on morning rounds with attending and neurology team. Plan finalized once signed by attending.

## 2022-12-21 NOTE — PROGRESS NOTE ADULT - SUBJECTIVE AND OBJECTIVE BOX
Neurology Progress Note    SUBJECTIVE/OBJECTIVE/INTERVAL EVENTS:     INTERVAL HISTORY:      MEDICATIONS  (STANDING):  chlorhexidine 4% Liquid 1 Application(s) Topical <User Schedule>  dextrose 5%. 1000 milliLiter(s) (100 mL/Hr) IV Continuous <Continuous>  dextrose 5%. 1000 milliLiter(s) (50 mL/Hr) IV Continuous <Continuous>  dextrose 50% Injectable 25 Gram(s) IV Push once  dextrose 50% Injectable 12.5 Gram(s) IV Push once  dextrose 50% Injectable 25 Gram(s) IV Push once  enoxaparin Injectable 40 milliGRAM(s) SubCutaneous every 24 hours  glucagon  Injectable 1 milliGRAM(s) IntraMuscular once  insulin lispro (ADMELOG) corrective regimen sliding scale   SubCutaneous three times a day before meals  insulin lispro (ADMELOG) corrective regimen sliding scale   SubCutaneous at bedtime  lidocaine 1% (Preservative-free) Injectable 40 milliLiter(s) Local Injection once  pantoprazole    Tablet 40 milliGRAM(s) Oral daily  predniSONE   Tablet 60 milliGRAM(s) Oral daily    MEDICATIONS  (PRN):  acetaminophen     Tablet .. 650 milliGRAM(s) Oral every 6 hours PRN Temp greater or equal to 38C (100.4F), Mild Pain (1 - 3)  dextrose Oral Gel 15 Gram(s) Oral once PRN Blood Glucose LESS THAN 70 milliGRAM(s)/deciliter  sodium chloride 0.9% lock flush 10 milliLiter(s) IV Push every 1 hour PRN Pre/post blood products, medications, blood draw, and to maintain line patency      VITALS & EXAMINATION:  Vital Signs Last 24 Hrs  T(C): 36.7 (21 Dec 2022 05:00), Max: 36.8 (20 Dec 2022 09:11)  T(F): 98 (21 Dec 2022 05:00), Max: 98.3 (20 Dec 2022 14:04)  HR: 80 (21 Dec 2022 05:00) (64 - 82)  BP: 106/70 (21 Dec 2022 05:00) (99/63 - 115/73)  BP(mean): --  RR: 18 (21 Dec 2022 05:00) (18 - 18)  SpO2: 99% (21 Dec 2022 05:00) (97% - 99%)    Parameters below as of 21 Dec 2022 05:00  Patient On (Oxygen Delivery Method): room air         LABORATORY:  CBC                       13.1   12.62 )-----------( 368      ( 20 Dec 2022 07:16 )             40.6     Chem 12-20    136  |  100  |  17  ----------------------------<  90  4.3   |  26  |  0.63    Ca    9.1      20 Dec 2022 07:16  Phos  4.8     12-20  Mg     2.5     12-20    TPro  6.8  /  Alb  3.7  /  TBili  0.7  /  DBili  x   /  AST  15  /  ALT  68<H>  /  AlkPhos  73  12-20    LFTs LIVER FUNCTIONS - ( 20 Dec 2022 07:16 )  Alb: 3.7 g/dL / Pro: 6.8 g/dL / ALK PHOS: 73 U/L / ALT: 68 U/L / AST: 15 U/L / GGT: x           Coagulopathy PT/INR - ( 20 Dec 2022 07:16 )   PT: 13.1 sec;   INR: 1.14 ratio         PTT - ( 20 Dec 2022 07:16 )  PTT:23.3 sec  Lipid Panel 12-15 Chol 238<H> LDL -- HDL 55 Trig 37  A1c   Cardiac enzymes     U/A   CSF  Immunological  Other    STUDIES & IMAGING: (EEG, CT, MR, U/S, TTE/MADELINE):      Radiology (XR, CT, MR, U/S, TTE/MADELINE):  < from: MR Head w/wo IV Cont (12.15.22 @ 20:26) >  IMPRESSION:  Mild abnormal enhancement and T2 hyperintensity involving the right optic   nerve compatible with optic neuritis.    Multiple foci of white matter T2 hyperintensity which are nonspecific in   appearance. Demyelinating disease is a prime consideration although other   possibilities are not excluded.    --- End of Report ---      < end of copied text >  < from: MR Thoracic Spine w/wo IV Cont (12.15.22 @ 18:50) >  IMPRESSION: No abnormal spinal cord lesions are identified.      < end of copied text >   Neurology Progress Note    SUBJECTIVE/OBJECTIVE/INTERVAL EVENTS:     INTERVAL HISTORY: 27 year-old woman with a PMH of asthma presented on 12/14/22 with c/o acute vision loss OD of 2 days duration described as initial blurry vision in the inferior half of the visual field in the right eye, with progression to blurry vision in the entire right eye and darkening of the inferior half of the visual field in the right eye, for which Neurology was consulted. Neuro exam stable with blurred vision on superior quadrants on R eye and R APD. Completed solumedrol 1g IV extended to 7 days (12/14 - 12/20). S/p neurorads LP 12/19. S/p Shiley placement 12/19. PLEX started 12/20.     MEDICATIONS  (STANDING):  chlorhexidine 4% Liquid 1 Application(s) Topical <User Schedule>  dextrose 5%. 1000 milliLiter(s) (100 mL/Hr) IV Continuous <Continuous>  dextrose 5%. 1000 milliLiter(s) (50 mL/Hr) IV Continuous <Continuous>  dextrose 50% Injectable 25 Gram(s) IV Push once  dextrose 50% Injectable 12.5 Gram(s) IV Push once  dextrose 50% Injectable 25 Gram(s) IV Push once  enoxaparin Injectable 40 milliGRAM(s) SubCutaneous every 24 hours  glucagon  Injectable 1 milliGRAM(s) IntraMuscular once  insulin lispro (ADMELOG) corrective regimen sliding scale   SubCutaneous three times a day before meals  insulin lispro (ADMELOG) corrective regimen sliding scale   SubCutaneous at bedtime  lidocaine 1% (Preservative-free) Injectable 40 milliLiter(s) Local Injection once  pantoprazole    Tablet 40 milliGRAM(s) Oral daily  predniSONE   Tablet 60 milliGRAM(s) Oral daily    MEDICATIONS  (PRN):  acetaminophen     Tablet .. 650 milliGRAM(s) Oral every 6 hours PRN Temp greater or equal to 38C (100.4F), Mild Pain (1 - 3)  dextrose Oral Gel 15 Gram(s) Oral once PRN Blood Glucose LESS THAN 70 milliGRAM(s)/deciliter  sodium chloride 0.9% lock flush 10 milliLiter(s) IV Push every 1 hour PRN Pre/post blood products, medications, blood draw, and to maintain line patency      VITALS & EXAMINATION:  Vital Signs Last 24 Hrs  T(C): 36.7 (21 Dec 2022 05:00), Max: 36.8 (20 Dec 2022 09:11)  T(F): 98 (21 Dec 2022 05:00), Max: 98.3 (20 Dec 2022 14:04)  HR: 80 (21 Dec 2022 05:00) (64 - 82)  BP: 106/70 (21 Dec 2022 05:00) (99/63 - 115/73)  BP(mean): --  RR: 18 (21 Dec 2022 05:00) (18 - 18)  SpO2: 99% (21 Dec 2022 05:00) (97% - 99%)    Parameters below as of 21 Dec 2022 05:00  Patient On (Oxygen Delivery Method): room air       LABORATORY:  CBC                       13.1   12.62 )-----------( 368      ( 20 Dec 2022 07:16 )             40.6     Chem 12-20    136  |  100  |  17  ----------------------------<  90  4.3   |  26  |  0.63    Ca    9.1      20 Dec 2022 07:16  Phos  4.8     12-20  Mg     2.5     12-20    TPro  6.8  /  Alb  3.7  /  TBili  0.7  /  DBili  x   /  AST  15  /  ALT  68<H>  /  AlkPhos  73  12-20    LFTs LIVER FUNCTIONS - ( 20 Dec 2022 07:16 )  Alb: 3.7 g/dL / Pro: 6.8 g/dL / ALK PHOS: 73 U/L / ALT: 68 U/L / AST: 15 U/L / GGT: x           Coagulopathy PT/INR - ( 20 Dec 2022 07:16 )   PT: 13.1 sec;   INR: 1.14 ratio      PTT - ( 20 Dec 2022 07:16 )  PTT:23.3 sec  Lipid Panel 12-15 Chol 238<H> LDL -- HDL 55 Trig 37  A1C (5.4)  LDL (176), cholesterol 238  TSH (0.65)   vitamin D 25-OH (36.4)  B12 (722), B9 (8.5)  ESR (54, elevated)  NMO ab (-), MOG (-)   HAIDER (1:320)   c- & p- ANCA (-), ACE (38), Lyme Ab (-), quantiferon gold(-), RPR (-)    LP   total nucleated cells <1, RBC 2, neutrophil (-), LDH (-)         CSF glu 75(elevated), Protein 28 (wnl)      Radiology (XR, CT, MR, U/S, TTE/MADELINE):  < from: MR Head w/wo IV Cont (12.15.22 @ 20:26) >  IMPRESSION:  Mild abnormal enhancement and T2 hyperintensity involving the right optic   nerve compatible with optic neuritis.    Multiple foci of white matter T2 hyperintensity which are nonspecific in   appearance. Demyelinating disease is a prime consideration although other   possibilities are not excluded.    --- End of Report ---      < end of copied text >  < from: MR Thoracic Spine w/wo IV Cont (12.15.22 @ 18:50) >  IMPRESSION: No abnormal spinal cord lesions are identified.      < end of copied text >

## 2022-12-21 NOTE — PROGRESS NOTE ADULT - ATTENDING COMMENTS
27 year old woman w/ hx of asthma, admitted for progressive vision loss OD over 1 week and diagnosed with acute optic neuritis based on clinical and MRI findings, s/p 7 days of IV solumedrol with mild improvement in symptoms.      no recent illness. no recent vaccination. No prior hx of clinical neurological attacks.      CTA H/N: negative  MRI brain w/w/o 12/15/2022- few T2 hyperintense white matter lesions- bilateral frontal lobes and R brachium pontis (these appear ovoid in shape and seems suspicious for demyelination. Could argue that the right frontal lobe lesion in a juxtacortical lesion). Non enhancing.   MRI orbits w/w/o 12/15/2022- mild abnormal signal and enhancement involving right optic nerve  MRI C and T spine w/w/o 12/15/2022- no cord lesions    Labs : ACE neg, HAIDER 1:320, ESR 53, CRP 9, Lyme neg, ANCA neg.           Serum MOG ab negative. NMO ab serum negative.   LP 12/19: < 1 cell, protein 28, PCR negative. no culture growth. OCB pending.     s/o: Stable vision this AM. Reports improvement mostly in peripheral vision. Able to count fingers in the periphery of right eye, but not centrally. Centrally still just able to see hand motion. Had 1st sessions of PLEX yesterday, it went well and without any complications. On oral steroid taper now. op    Imp: Acute optic neuritis OD with abnormal brain MRI- most consistent with clinically isolated syndrome at this time.     Given severity of vision loss, pt completed a full 7 days of IV solumedrol (last day 12/20), will now follow by prednisone taper (60 mg x 4 days, 40 mg x 4 days and then 20 mg x 4 day and then stop).   Judy line placed 12/19, will plan for 5 sessions of PLEX (every other day)- 1st session completed 12/19  Will follow up on CSF results, particularly OCB    Dispo: discharge home after completion of PLEX. Will follow up with me as outpatient

## 2022-12-22 LAB
ALBUMIN SERPL ELPH-MCNC: 4.1 G/DL — SIGNIFICANT CHANGE UP (ref 3.3–5)
ALP SERPL-CCNC: 49 U/L — SIGNIFICANT CHANGE UP (ref 40–120)
ALT FLD-CCNC: 63 U/L — HIGH (ref 10–45)
ANION GAP SERPL CALC-SCNC: 13 MMOL/L — SIGNIFICANT CHANGE UP (ref 5–17)
APTT BLD: 25.9 SEC — LOW (ref 27.5–35.5)
AST SERPL-CCNC: 12 U/L — SIGNIFICANT CHANGE UP (ref 10–40)
BASOPHILS # BLD AUTO: 0 K/UL — SIGNIFICANT CHANGE UP (ref 0–0.2)
BASOPHILS NFR BLD AUTO: 0 % — SIGNIFICANT CHANGE UP (ref 0–2)
BILIRUB SERPL-MCNC: 0.6 MG/DL — SIGNIFICANT CHANGE UP (ref 0.2–1.2)
BUN SERPL-MCNC: 17 MG/DL — SIGNIFICANT CHANGE UP (ref 7–23)
CA-I BLD-SCNC: 1.15 MMOL/L — SIGNIFICANT CHANGE UP (ref 1.15–1.33)
CALCIUM SERPL-MCNC: 8.8 MG/DL — SIGNIFICANT CHANGE UP (ref 8.4–10.5)
CHLORIDE SERPL-SCNC: 105 MMOL/L — SIGNIFICANT CHANGE UP (ref 96–108)
CO2 SERPL-SCNC: 25 MMOL/L — SIGNIFICANT CHANGE UP (ref 22–31)
CREAT SERPL-MCNC: 0.78 MG/DL — SIGNIFICANT CHANGE UP (ref 0.5–1.3)
CULTURE RESULTS: NO GROWTH — SIGNIFICANT CHANGE UP
EBV PCR: SIGNIFICANT CHANGE UP IU/ML
EGFR: 107 ML/MIN/1.73M2 — SIGNIFICANT CHANGE UP
EOSINOPHIL # BLD AUTO: 0 K/UL — SIGNIFICANT CHANGE UP (ref 0–0.5)
EOSINOPHIL NFR BLD AUTO: 0 % — SIGNIFICANT CHANGE UP (ref 0–6)
FIBRINOGEN PPP-MCNC: 219 MG/DL — SIGNIFICANT CHANGE UP (ref 200–445)
GLUCOSE BLDC GLUCOMTR-MCNC: 100 MG/DL — HIGH (ref 70–99)
GLUCOSE BLDC GLUCOMTR-MCNC: 115 MG/DL — HIGH (ref 70–99)
GLUCOSE BLDC GLUCOMTR-MCNC: 125 MG/DL — HIGH (ref 70–99)
GLUCOSE BLDC GLUCOMTR-MCNC: 76 MG/DL — SIGNIFICANT CHANGE UP (ref 70–99)
GLUCOSE SERPL-MCNC: 76 MG/DL — SIGNIFICANT CHANGE UP (ref 70–99)
HCT VFR BLD CALC: 43.5 % — SIGNIFICANT CHANGE UP (ref 34.5–45)
HGB BLD-MCNC: 13.9 G/DL — SIGNIFICANT CHANGE UP (ref 11.5–15.5)
INNER EAR 68KD AB FLD QL: <1.5 U/L — SIGNIFICANT CHANGE UP (ref 0–2.5)
INR BLD: 1.09 RATIO — SIGNIFICANT CHANGE UP (ref 0.88–1.16)
LYMPHOCYTES # BLD AUTO: 41 % — SIGNIFICANT CHANGE UP (ref 13–44)
LYMPHOCYTES # BLD AUTO: 6.49 K/UL — HIGH (ref 1–3.3)
MAGNESIUM SERPL-MCNC: 2.6 MG/DL — SIGNIFICANT CHANGE UP (ref 1.6–2.6)
MANUAL SMEAR VERIFICATION: SIGNIFICANT CHANGE UP
MCHC RBC-ENTMCNC: 25.7 PG — LOW (ref 27–34)
MCHC RBC-ENTMCNC: 32 GM/DL — SIGNIFICANT CHANGE UP (ref 32–36)
MCV RBC AUTO: 80.6 FL — SIGNIFICANT CHANGE UP (ref 80–100)
MONOCYTES # BLD AUTO: 0.27 K/UL — SIGNIFICANT CHANGE UP (ref 0–0.9)
MONOCYTES NFR BLD AUTO: 1.7 % — LOW (ref 2–14)
NEUTROPHILS # BLD AUTO: 8.93 K/UL — HIGH (ref 1.8–7.4)
NEUTROPHILS NFR BLD AUTO: 56.4 % — SIGNIFICANT CHANGE UP (ref 43–77)
PHOSPHATE SERPL-MCNC: 4.5 MG/DL — SIGNIFICANT CHANGE UP (ref 2.5–4.5)
PLAT MORPH BLD: NORMAL — SIGNIFICANT CHANGE UP
PLATELET # BLD AUTO: 295 K/UL — SIGNIFICANT CHANGE UP (ref 150–400)
POTASSIUM SERPL-MCNC: 4 MMOL/L — SIGNIFICANT CHANGE UP (ref 3.5–5.3)
POTASSIUM SERPL-SCNC: 4 MMOL/L — SIGNIFICANT CHANGE UP (ref 3.5–5.3)
PROT SERPL-MCNC: 5.8 G/DL — LOW (ref 6–8.3)
PROTHROM AB SERPL-ACNC: 12.5 SEC — SIGNIFICANT CHANGE UP (ref 10.5–13.4)
RBC # BLD: 5.4 M/UL — HIGH (ref 3.8–5.2)
RBC # FLD: 14.6 % — HIGH (ref 10.3–14.5)
RBC BLD AUTO: SIGNIFICANT CHANGE UP
SODIUM SERPL-SCNC: 143 MMOL/L — SIGNIFICANT CHANGE UP (ref 135–145)
SPECIMEN SOURCE: SIGNIFICANT CHANGE UP
VARIANT LYMPHS # BLD: 0.9 % — SIGNIFICANT CHANGE UP (ref 0–6)
WBC # BLD: 15.84 K/UL — HIGH (ref 3.8–10.5)
WBC # FLD AUTO: 15.84 K/UL — HIGH (ref 3.8–10.5)

## 2022-12-22 PROCEDURE — 36514 APHERESIS PLASMA: CPT

## 2022-12-22 PROCEDURE — 99232 SBSQ HOSP IP/OBS MODERATE 35: CPT

## 2022-12-22 PROCEDURE — 99221 1ST HOSP IP/OBS SF/LOW 40: CPT | Mod: 25

## 2022-12-22 RX ADMIN — Medication 2000 UNIT(S): at 11:42

## 2022-12-22 RX ADMIN — Medication 60 MILLIGRAM(S): at 06:38

## 2022-12-22 RX ADMIN — PANTOPRAZOLE SODIUM 40 MILLIGRAM(S): 20 TABLET, DELAYED RELEASE ORAL at 11:43

## 2022-12-22 RX ADMIN — ENOXAPARIN SODIUM 40 MILLIGRAM(S): 100 INJECTION SUBCUTANEOUS at 22:16

## 2022-12-22 RX ADMIN — CHLORHEXIDINE GLUCONATE 1 APPLICATION(S): 213 SOLUTION TOPICAL at 08:34

## 2022-12-22 NOTE — PROGRESS NOTE ADULT - ASSESSMENT
Assessment: 27 year-old woman with a PMH of asthma presented on 12/14/22 with c/o acute vision loss OD of 2 days duration described as initial blurry vision in the inferior half of the visual field in the right eye, with progression to blurry vision in the entire right eye and darkening of the inferior half of the visual field in the right eye, for which Neurology was consulted. Neuro exam stable with blurred vision on superior quadrants on R eye and R APD. Completed solumedrol 1g IV extended to 7 days (12/14 - 12/20). S/p neurorads LP 12/19. S/p Shiley placement 12/19. PLEX started 12/20.     Impression: Improving vision loss OD possibly due to optic neuritis vs optic neuropathy secondary to likely demyelinating / autoimmune condition.     Plan  [x] vit D3 2000 IU daily  [] Hep panel, NICOLE virus (pending collection)  [x] MRI brain & orbits w/wo contrast  [x] MRI C and T-Spine w/wo contrast   [x] Solumedrol 1g IV (12/14 - 12/20)  [x]SERUM: A1C, lipid panel, TSH, vitamin D 25-OH, B12, B9, ESR, pending crp  NMO ab, MOG, HAIDER, ANCA, ACE, Lyme Ab, quantiferon gold, RPR  [x] LP   [x] Ophthalmology evaluation, normal retinal exam with no disc edema  [x] s/p IR for shiley placement 12/19  [/] PLEX (day 2/5, started on 12/20).  Day 2 tomorrow, please draw coags, ionized calcium and fibrinogen prior to PLEX on treatment days    #Preventive Measures  - ISS & POCT glucose monitoring  - GI ppx with PPI while on high dose steroids  - Telemonitoring, Neurochecks/VS Q4H  - DVT ppx

## 2022-12-22 NOTE — PROGRESS NOTE ADULT - ASSESSMENT
27y female w/ pmhx/ochx of asthma presenting with inferior visual field cut of 1 day duration. Examined by outpatient general ophthalmologist and retina specialist with no abnormal retinal findings. Undergoing workup for optic neuritis OD.     # Optic neuritis right eye  - normal retinal exam with no disc edema - confirmed on outpatient OCT RNFL. Inferior hemifield defect confirmed on outpatient HVF testing.   - MR brain and orbits with enhancement optic nerve and multiple enhancing lesions of white matter - consistent with optic neuritis and demyelinating disease  - atypical workup thus far negative  - NMO and MOG serum Abs negative. CSF ab pending.   - s/p IV steroids with no improvement, s/p 2st dose of PLEX today with improvement from HM --20/400-->20/200 peripherally with improvement in confrontational visual field   - Continue PLEX per neurology  - Will follow    Outpatient follow-up: Patient should follow-up with his/her ophthalmologist or with [facility] Department of Ophthalmology at the address below     [address] [place] [address]. [address]  [place], [place] [phone]

## 2022-12-22 NOTE — PROGRESS NOTE ADULT - SUBJECTIVE AND OBJECTIVE BOX
[facility] DEPARTMENT OF OPHTHALMOLOGY  ------------------------------------------------------------------------------  Jeramie Gallegos MD PGY-3  Pager: [phone], also available on teams  ------------------------------------------------------------------------------    Following for optic neuritis OD  Interval History: No acute events overnight. Pt reports mild improvement in peripheral vision, denies pain in EOM    MEDICATIONS  (STANDING):  chlorhexidine 4% Liquid 1 Application(s) Topical <User Schedule>  cholecalciferol 2000 Unit(s) Oral daily  dextrose 5%. 1000 milliLiter(s) (100 mL/Hr) IV Continuous <Continuous>  dextrose 5%. 1000 milliLiter(s) (50 mL/Hr) IV Continuous <Continuous>  dextrose 50% Injectable 25 Gram(s) IV Push once  dextrose 50% Injectable 12.5 Gram(s) IV Push once  dextrose 50% Injectable 25 Gram(s) IV Push once  enoxaparin Injectable 40 milliGRAM(s) SubCutaneous every 24 hours  glucagon  Injectable 1 milliGRAM(s) IntraMuscular once  insulin lispro (ADMELOG) corrective regimen sliding scale   SubCutaneous three times a day before meals  insulin lispro (ADMELOG) corrective regimen sliding scale   SubCutaneous at bedtime  lidocaine 1% (Preservative-free) Injectable 40 milliLiter(s) Local Injection once  pantoprazole    Tablet 40 milliGRAM(s) Oral daily  predniSONE   Tablet 60 milliGRAM(s) Oral daily    MEDICATIONS  (PRN):  acetaminophen     Tablet .. 650 milliGRAM(s) Oral every 6 hours PRN Temp greater or equal to 38C (100.4F), Mild Pain (1 - 3)  dextrose Oral Gel 15 Gram(s) Oral once PRN Blood Glucose LESS THAN 70 milliGRAM(s)/deciliter  sodium chloride 0.9% lock flush 10 milliLiter(s) IV Push every 1 hour PRN Pre/post blood products, medications, blood draw, and to maintain line patency      VITALS: T(C): 36.7 (12-22-22 @ 13:23)  T(F): 98 (12-22-22 @ 13:23), Max: 98.2 (12-22-22 @ 09:45)  HR: 71 (12-22-22 @ 13:23) (63 - 78)  BP: 96/60 (12-22-22 @ 13:23) (93/55 - 109/63)  RR:  (16 - 18)  SpO2:  (97% - 100%)  Wt(kg): --  General: AAO x 3, appropriate mood and affect    Ophthalmology Exam:  Visual acuity (sc): 20/200 peripherally, CF 3' centrally OD, 20/20 OS  Pupils: PERRL OU,  RAPD OD  Extraocular movements (EOMs): Full OU, no pain, no diplopia  Confrontational Visual Field (CVF): OD full, full OS  Color plates: OD 0/12 OS 12/12    Pen Light Exam (PLE)  External: Flat OU  Lids/Lashes/Lacrimal Ducts: Flat OU    Sclera/Conjunctiva: W+Q OU  Cornea: Cl OU  Anterior Chamber: D+F OU    Iris: Flat OU  Lens: Cl OU      MOG Antibody Reflex to Titer (12.15.22 @ 07:30)    MOG Antibody CBA, Serum: Negative: No informative autoantibodies were detected in this  evaluation. A negative result does not preclude a diagnosis  of an inflammatory CNS demyelinating disorder.  -------------------ADDITIONAL INFORMATION-------------------  This test was developed and its performance characteristics  determined by [facility] in a manner consistent with CLIA  requirements. This test has not been cleared or approved by  the U.S. Food and Drug Administration.  Test Performed by:  [facility] [address] [address]  : Jose Spivey M.D. Ph.D.; CLIA# [id number]    Neuromyelitis Optica Antibody (12.15.22 @ 07:30)    Neuromyelitis Optica Antibody: Negative: Recommend repeat testing in 6 months if clinical suspicion  is high. Negative result can occur in the setting of  immunosuppression.  -------------------ADDITIONAL INFORMATION-------------------  This test was developed and its performance characteristics  determined by [facility] in a manner consistent with CLIA  requirements. This test has not been cleared or approved by  the U.S. Food and Drug Administration.  Test Performed by:  [address] [address]  : Jose Spivey M.D. Ph.D.; CLIA# [id number]

## 2022-12-22 NOTE — CHART NOTE - NSCHARTNOTEFT_GEN_A_CORE
27 year-old woman with a PMH of asthma brought in by family to the ED on 12/14/22 with c/o acute vision loss OD of 2 days duration described as initial blurry vision in the inferior half of the visual field in the right eye, with progression to blurry vision in the entire right eye and darkening of the inferior half of the visual field in the right eye, for which Neurology was consulted. Neuro exam stable with blurred vision on superior quadrants on R eye and R APD. Completed solumedrol 1g IV extended to 7 days (12/14 - 12/20). Plan for PLEX given no significant improvement. BB contacted to initiate PLEX. A course of 5 PLEX over 10 days planned. Plan of care discussed with the patient and neurology team.     PLEX#1 on 12/20/22, 1PV with 5% albumin as replacement fluid. Pt tolerated the procedure well.    PLEX#2 today. Labs reviewed. Fibrinogen 219mg/dl, iCa 1.15mmol/L. 1 plasma volume with 5% albumin as replacement fluid.    PLEX#3 scheduled on 12/24/22. Please monitor pt CBC, Coag, Fibrinogen, and iCa prior to the procedure.

## 2022-12-22 NOTE — PROGRESS NOTE ADULT - SUBJECTIVE AND OBJECTIVE BOX
Neurology Progress Note    SUBJECTIVE/OBJECTIVE/INTERVAL EVENTS:     INTERVAL HISTORY: 27 year-old woman with a PMH of asthma presented on 12/14/22 with c/o acute vision loss OD of 2 days duration described as initial blurry vision in the inferior half of the visual field in the right eye, with progression to blurry vision in the entire right eye and darkening of the inferior half of the visual field in the right eye, for which Neurology was consulted. Neuro exam stable with blurred vision on superior quadrants on R eye and R APD. Completed solumedrol 1g IV extended to 7 days (12/14 - 12/20). S/p neurorads LP 12/19. S/p Shiley placement 12/19. PLEX started 12/20, today is     MEDICATIONS  (STANDING):  chlorhexidine 4% Liquid 1 Application(s) Topical <User Schedule>  dextrose 5%. 1000 milliLiter(s) (100 mL/Hr) IV Continuous <Continuous>  dextrose 5%. 1000 milliLiter(s) (50 mL/Hr) IV Continuous <Continuous>  dextrose 50% Injectable 25 Gram(s) IV Push once  dextrose 50% Injectable 12.5 Gram(s) IV Push once  dextrose 50% Injectable 25 Gram(s) IV Push once  enoxaparin Injectable 40 milliGRAM(s) SubCutaneous every 24 hours  glucagon  Injectable 1 milliGRAM(s) IntraMuscular once  insulin lispro (ADMELOG) corrective regimen sliding scale   SubCutaneous three times a day before meals  insulin lispro (ADMELOG) corrective regimen sliding scale   SubCutaneous at bedtime  lidocaine 1% (Preservative-free) Injectable 40 milliLiter(s) Local Injection once  pantoprazole    Tablet 40 milliGRAM(s) Oral daily  predniSONE   Tablet 60 milliGRAM(s) Oral daily    MEDICATIONS  (PRN):  acetaminophen     Tablet .. 650 milliGRAM(s) Oral every 6 hours PRN Temp greater or equal to 38C (100.4F), Mild Pain (1 - 3)  dextrose Oral Gel 15 Gram(s) Oral once PRN Blood Glucose LESS THAN 70 milliGRAM(s)/deciliter  sodium chloride 0.9% lock flush 10 milliLiter(s) IV Push every 1 hour PRN Pre/post blood products, medications, blood draw, and to maintain line patency      VITALS & EXAMINATION:  Vital Signs Last 24 Hrs  T(C): 36.7 (21 Dec 2022 05:00), Max: 36.8 (20 Dec 2022 09:11)  T(F): 98 (21 Dec 2022 05:00), Max: 98.3 (20 Dec 2022 14:04)  HR: 80 (21 Dec 2022 05:00) (64 - 82)  BP: 106/70 (21 Dec 2022 05:00) (99/63 - 115/73)  BP(mean): --  RR: 18 (21 Dec 2022 05:00) (18 - 18)  SpO2: 99% (21 Dec 2022 05:00) (97% - 99%)    Parameters below as of 21 Dec 2022 05:00  Patient On (Oxygen Delivery Method): room air       LABORATORY:  CBC                       13.1   12.62 )-----------( 368      ( 20 Dec 2022 07:16 )             40.6     Chem 12-20    136  |  100  |  17  ----------------------------<  90  4.3   |  26  |  0.63    Ca    9.1      20 Dec 2022 07:16  Phos  4.8     12-20  Mg     2.5     12-20    TPro  6.8  /  Alb  3.7  /  TBili  0.7  /  DBili  x   /  AST  15  /  ALT  68<H>  /  AlkPhos  73  12-20    LFTs LIVER FUNCTIONS - ( 20 Dec 2022 07:16 )  Alb: 3.7 g/dL / Pro: 6.8 g/dL / ALK PHOS: 73 U/L / ALT: 68 U/L / AST: 15 U/L / GGT: x           Coagulopathy PT/INR - ( 20 Dec 2022 07:16 )   PT: 13.1 sec;   INR: 1.14 ratio      PTT - ( 20 Dec 2022 07:16 )  PTT:23.3 sec  Lipid Panel 12-15 Chol 238<H> LDL -- HDL 55 Trig 37  A1C (5.4)  LDL (176), cholesterol 238  TSH (0.65)   vitamin D 25-OH (36.4)  B12 (722), B9 (8.5)  ESR (54, elevated)  NMO ab (-), MOG (-)   HAIDER (1:320)   c- & p- ANCA (-), ACE (38), Lyme Ab (-), quantiferon gold(-), RPR (-)    LP   total nucleated cells <1, RBC 2, neutrophil (-), LDH (-)         CSF glu 75(elevated), Protein 28 (wnl)      Radiology (XR, CT, MR, U/S, TTE/MADELINE):  < from: MR Head w/wo IV Cont (12.15.22 @ 20:26) >  IMPRESSION:  Mild abnormal enhancement and T2 hyperintensity involving the right optic   nerve compatible with optic neuritis.    Multiple foci of white matter T2 hyperintensity which are nonspecific in   appearance. Demyelinating disease is a prime consideration although other   possibilities are not excluded.    --- End of Report ---      < end of copied text >  < from: MR Thoracic Spine w/wo IV Cont (12.15.22 @ 18:50) >  IMPRESSION: No abnormal spinal cord lesions are identified.      < end of copied text >   Neurology Progress Note    SUBJECTIVE/OBJECTIVE/INTERVAL EVENTS:     INTERVAL HISTORY: 27 year-old woman with a PMH of asthma presented on 12/14/22 with c/o acute vision loss OD of 2 days duration described as initial blurry vision in the inferior half of the visual field in the right eye, with progression to blurry vision in the entire right eye and darkening of the inferior half of the visual field in the right eye, for which Neurology was consulted. Neuro exam stable with blurred vision on superior quadrants on R eye and R APD. Completed solumedrol 1g IV extended to 7 days (12/14 - 12/20). S/p neurorads LP 12/19. S/p Shiley placement 12/19. PLEX started 12/20, today is     MEDICATIONS  (STANDING):  chlorhexidine 4% Liquid 1 Application(s) Topical <User Schedule>  dextrose 5%. 1000 milliLiter(s) (100 mL/Hr) IV Continuous <Continuous>  dextrose 5%. 1000 milliLiter(s) (50 mL/Hr) IV Continuous <Continuous>  dextrose 50% Injectable 25 Gram(s) IV Push once  dextrose 50% Injectable 12.5 Gram(s) IV Push once  dextrose 50% Injectable 25 Gram(s) IV Push once  General:  Constitutional: Female, appears stated age   Head: Normocephalic; Eyes: clear sclera;   Extremities: No cyanosis; Skin: No fabio edema of LE  Resp: breathing comfortably       enoxaparin Injectable 40 milliGRAM(s) SubCutaneous every 24 hours  glucagon  Injectable 1 milliGRAM(s) IntraMuscular once  insulin lispro (ADMELOG) corrective regimen sliding scale   SubCutaneous three times a day before meals  insulin lispro (ADMELOG) corrective regimen sliding scale   SubCutaneous at bedtime  lidocaine 1% (Preservative-free) Injectable 40 milliLiter(s) Local Injection once  pantoprazole    Tablet 40 milliGRAM(s) Oral daily  predniSONE   Tablet 60 milliGRAM(s) Oral daily    MEDICATIONS  (PRN):  acetaminophen     Tablet .. 650 milliGRAM(s) Oral every 6 hours PRN Temp greater or equal to 38C (100.4F), Mild Pain (1 - 3)  dextrose Oral Gel 15 Gram(s) Oral once PRN Blood Glucose LESS THAN 70 milliGRAM(s)/deciliter  sodium chloride 0.9% lock flush 10 milliLiter(s) IV Push every 1 hour PRN Pre/post blood products, medications, blood draw, and to maintain line patency      VITALS & EXAMINATION:  Vital Signs Last 24 Hrs  T(C): 36.7 (21 Dec 2022 05:00), Max: 36.8 (20 Dec 2022 09:11)  T(F): 98 (21 Dec 2022 05:00), Max: 98.3 (20 Dec 2022 14:04)  HR: 80 (21 Dec 2022 05:00) (64 - 82)  BP: 106/70 (21 Dec 2022 05:00) (99/63 - 115/73)  BP(mean): --  RR: 18 (21 Dec 2022 05:00) (18 - 18)  SpO2: 99% (21 Dec 2022 05:00) (97% - 99%)    Parameters below as of 21 Dec 2022 05:00  Patient On (Oxygen Delivery Method): room air    Neurological (>12):  MS: Awake, alert.  Follows commands. Attends to examiner  Language: Speech is hypophonic, clear, fluent, good repetition,  comprehension, registration of words.  CNs: PERRL (R 3mm, L 3mm). left visual field defect. right visual field intact. EOMI. V1-3 intact LT, No facial asymmetry b/l. Hearing grossly normal b/l. Tongue midline.     Motor - Normal bulk and tone throughout. No pronator drift.    L/R         Deltoid  5/5    Biceps   5/5      Triceps  5/5         Wrist Extension 5/5   Wrist Flexion  5/5      5/5   L/R         Hip Flexion  5/5    Hip Extension  5/5  Knee Extension  5/5  Dorsiflexion  5/5      Plantar Flexion 5/5     Sensation: Intact to LT b/l. Cortical: Extinction on DSS (neglect): none  Reflexes L/R:  Biceps(C5) 2/2  BR(C6) 2/2   Triceps(C7)  2/2 Patellar(L4)   2/2   Toes: mute  Coordination: No dysmetria to FTN b/l UE  Gait: Normal Romberg. No postural instability. Normal stance.     LABORATORY:  CBC                       13.1   12.62 )-----------( 368      ( 20 Dec 2022 07:16 )             40.6     Chem 12-20    136  |  100  |  17  ----------------------------<  90  4.3   |  26  |  0.63    Ca    9.1      20 Dec 2022 07:16  Phos  4.8     12-20  Mg     2.5     12-20    TPro  6.8  /  Alb  3.7  /  TBili  0.7  /  DBili  x   /  AST  15  /  ALT  68<H>  /  AlkPhos  73  12-20    LFTs LIVER FUNCTIONS - ( 20 Dec 2022 07:16 )  Alb: 3.7 g/dL / Pro: 6.8 g/dL / ALK PHOS: 73 U/L / ALT: 68 U/L / AST: 15 U/L / GGT: x           Coagulopathy PT/INR - ( 20 Dec 2022 07:16 )   PT: 13.1 sec;   INR: 1.14 ratio      PTT - ( 20 Dec 2022 07:16 )  PTT:23.3 sec  Lipid Panel 12-15 Chol 238<H> LDL -- HDL 55 Trig 37  A1C (5.4)  LDL (176), cholesterol 238  TSH (0.65)   vitamin D 25-OH (36.4)  B12 (722), B9 (8.5)  ESR (54, elevated)  NMO ab (-), MOG (-)   HAIDER (1:320)   c- & p- ANCA (-), ACE (38), Lyme Ab (-), quantiferon gold(-), RPR (-)    LP   total nucleated cells <1, RBC 2, neutrophil (-), LDH (-)         CSF glu 75(elevated), Protein 28 (wnl)      Radiology (XR, CT, MR, U/S, TTE/MADELINE):  < from: MR Head w/wo IV Cont (12.15.22 @ 20:26) >  IMPRESSION:  Mild abnormal enhancement and T2 hyperintensity involving the right optic   nerve compatible with optic neuritis.    Multiple foci of white matter T2 hyperintensity which are nonspecific in   appearance. Demyelinating disease is a prime consideration although other   possibilities are not excluded.    --- End of Report ---      < end of copied text >  < from: MR Thoracic Spine w/wo IV Cont (12.15.22 @ 18:50) >  IMPRESSION: No abnormal spinal cord lesions are identified.      < end of copied text >   Neurology Progress Note    SUBJECTIVE/OBJECTIVE/INTERVAL EVENTS:     INTERVAL HISTORY: 27 year-old woman with a PMH of asthma presented on 12/14/22 with c/o acute vision loss OD of 2 days duration described as initial blurry vision in the inferior half of the visual field in the right eye, with progression to blurry vision in the entire right eye and darkening of the inferior half of the visual field in the right eye, for which Neurology was consulted. Neuro exam stable with blurred vision on superior quadrants on R eye and R APD. Completed solumedrol 1g IV extended to 7 days (12/14 - 12/20). S/p neurorads LP 12/19. S/p Shiley placement 12/19. PLEX started 12/20, today is     MEDICATIONS  (STANDING):  chlorhexidine 4% Liquid 1 Application(s) Topical <User Schedule>  dextrose 5%. 1000 milliLiter(s) (100 mL/Hr) IV Continuous <Continuous>  dextrose 5%. 1000 milliLiter(s) (50 mL/Hr) IV Continuous <Continuous>  dextrose 50% Injectable 25 Gram(s) IV Push once  dextrose 50% Injectable 12.5 Gram(s) IV Push once  dextrose 50% Injectable 25 Gram(s) IV Push once  General:  Constitutional: Female, appears stated age   Head: Normocephalic; Eyes: clear sclera;   Extremities: No cyanosis; Skin: No fabio edema of LE  Resp: breathing comfortably       enoxaparin Injectable 40 milliGRAM(s) SubCutaneous every 24 hours  glucagon  Injectable 1 milliGRAM(s) IntraMuscular once  insulin lispro (ADMELOG) corrective regimen sliding scale   SubCutaneous three times a day before meals  insulin lispro (ADMELOG) corrective regimen sliding scale   SubCutaneous at bedtime  lidocaine 1% (Preservative-free) Injectable 40 milliLiter(s) Local Injection once  pantoprazole    Tablet 40 milliGRAM(s) Oral daily  predniSONE   Tablet 60 milliGRAM(s) Oral daily    MEDICATIONS  (PRN):  acetaminophen     Tablet .. 650 milliGRAM(s) Oral every 6 hours PRN Temp greater or equal to 38C (100.4F), Mild Pain (1 - 3)  dextrose Oral Gel 15 Gram(s) Oral once PRN Blood Glucose LESS THAN 70 milliGRAM(s)/deciliter  sodium chloride 0.9% lock flush 10 milliLiter(s) IV Push every 1 hour PRN Pre/post blood products, medications, blood draw, and to maintain line patency      VITALS & EXAMINATION:  Vital Signs Last 24 Hrs  T(C): 36.7 (21 Dec 2022 05:00), Max: 36.8 (20 Dec 2022 09:11)  T(F): 98 (21 Dec 2022 05:00), Max: 98.3 (20 Dec 2022 14:04)  HR: 80 (21 Dec 2022 05:00) (64 - 82)  BP: 106/70 (21 Dec 2022 05:00) (99/63 - 115/73)  BP(mean): --  RR: 18 (21 Dec 2022 05:00) (18 - 18)  SpO2: 99% (21 Dec 2022 05:00) (97% - 99%)    Parameters below as of 21 Dec 2022 05:00  Patient On (Oxygen Delivery Method): room air    Neurological (>12):  MS: Awake, alert.  Follows commands. Attends to examiner  Language: Speech is hypophonic, clear, fluent, good repetition,  comprehension, registration of words.  CNs: PERRL (R 3mm, L 3mm). left visual field defect. right visual field intact. EOMI. V1-3 intact LT, No facial asymmetry b/l. Hearing grossly normal b/l. Tongue midline.     Motor - Normal bulk and tone throughout. No pronator drift.    L/R         Deltoid  5/5    Biceps   5/5      Triceps  5/5         Wrist Extension 5/5   Wrist Flexion  5/5      5/5   L/R         Hip Flexion  5/5    Hip Extension  5/5  Knee Extension  5/5  Dorsiflexion  5/5      Plantar Flexion 5/5     Sensation: Intact to LT b/l. Cortical: Extinction on DSS (neglect): none  Reflexes L/R:  Biceps(C5) 2/2  BR(C6) 2/2   Triceps(C7)  2/2 Patellar(L4)   2/2   Toes: mute  Coordination: No dysmetria to FTN b/l UE  Gait: Normal Romberg. No postural instability. Normal stance.     LABORATORY:  CBC                       13.1   12.62 )-----------( 368      ( 20 Dec 2022 07:16 )             40.6     Chem 12-20    136  |  100  |  17  ----------------------------<  90  4.3   |  26  |  0.63    Ca    9.1      20 Dec 2022 07:16  Phos  4.8     12-20  Mg     2.5     12-20    TPro  6.8  /  Alb  3.7  /  TBili  0.7  /  DBili  x   /  AST  15  /  ALT  68<H>  /  AlkPhos  73  12-20    LFTs LIVER FUNCTIONS - ( 20 Dec 2022 07:16 )  Alb: 3.7 g/dL / Pro: 6.8 g/dL / ALK PHOS: 73 U/L / ALT: 68 U/L / AST: 15 U/L / GGT: x           Coagulopathy PT/INR - ( 20 Dec 2022 07:16 )   PT: 13.1 sec;   INR: 1.14 ratio      PTT - ( 20 Dec 2022 07:16 )  PTT:23.3 sec  Lipid Panel 12-15 Chol 238<H> LDL -- HDL 55 Trig 37  A1C (5.4)  LDL (176), cholesterol 238  TSH (0.65)   vitamin D 25-OH (36.4)  B12 (722), B9 (8.5)  ESR (54, elevated)  NMO ab (-), MOG (-)   HAIDER (1:320)   c- & p- ANCA (-), ACE (38), Lyme Ab (-), quantiferon gold(-), RPR (-)    LP   total nucleated cells <1, RBC 2, neutrophil (-), LDH (-)         CSF glu 75(elevated), Protein 28 (wnl)  Cytopathology: NEGATIVE FOR MALIGNANT CELLS.  Lymphocytes and mononuclear cells.  CSF PCR (-) gram stain & culture (-)    Radiology (XR, CT, MR, U/S, TTE/MADELINE):  < from: MR Head w/wo IV Cont (12.15.22 @ 20:26) >  IMPRESSION:  Mild abnormal enhancement and T2 hyperintensity involving the right optic   nerve compatible with optic neuritis.    Multiple foci of white matter T2 hyperintensity which are nonspecific in   appearance. Demyelinating disease is a prime consideration although other   possibilities are not excluded.    --- End of Report ---      < end of copied text >  < from: MR Thoracic Spine w/wo IV Cont (12.15.22 @ 18:50) >  IMPRESSION: No abnormal spinal cord lesions are identified.      < end of copied text >

## 2022-12-22 NOTE — PROGRESS NOTE ADULT - ATTENDING COMMENTS
27 year old woman w/ hx of asthma, admitted for progressive vision loss OD over 1 week and diagnosed with acute optic neuritis based on clinical and MRI findings, s/p 7 days of IV solumedrol with mild improvement in symptoms.      no recent illness. no recent vaccination. No prior hx of clinical neurological attacks.      CTA H/N: negative  MRI brain w/w/o 12/15/2022- few T2 hyperintense white matter lesions- bilateral frontal lobes and R brachium pontis (these appear ovoid in shape and seems suspicious for demyelination. Could argue that the right frontal lobe lesion in a juxtacortical lesion). Non enhancing.   MRI orbits w/w/o 12/15/2022- mild abnormal signal and enhancement involving right optic nerve  MRI C and T spine w/w/o 12/15/2022- no cord lesions    Labs : ACE neg, HAIDER 1:320, ESR 53, CRP 9, Lyme neg, ANCA neg.           Serum MOG ab negative. NMO ab serum negative.   LP 12/19: < 1 cell, protein 28, PCR negative. no culture growth. OCB pending.     s/o:  She remains stable. Peripheral vision OD is slowly improving, able to count fingers in peripheral vision but not centrally on the right. Did not sleep well last night and feeling tired. 2nd plex session planned for today. No new symptoms.    Imp: Acute optic neuritis OD with abnormal brain MRI- most consistent with clinically isolated syndrome at this time.     Given severity of vision loss, pt completed a full 7 days of IV solumedrol (last day 12/20),  now on prednisone taper (60 mg x 4 days, 40 mg x 4 days and then 20 mg x 4 day and then stop).   Judy line placed 12/19, will plan for 5 sessions of PLEX (every other day)- 1st session completed 12/19  Will follow up on CSF results, particularly OCB    Dispo: discharge home after completion of PLEX. Will follow up with me as outpatient

## 2022-12-23 LAB
ANION GAP SERPL CALC-SCNC: 11 MMOL/L — SIGNIFICANT CHANGE UP (ref 5–17)
BUN SERPL-MCNC: 12 MG/DL — SIGNIFICANT CHANGE UP (ref 7–23)
CALCIUM SERPL-MCNC: 8.5 MG/DL — SIGNIFICANT CHANGE UP (ref 8.4–10.5)
CHLORIDE SERPL-SCNC: 102 MMOL/L — SIGNIFICANT CHANGE UP (ref 96–108)
CO2 SERPL-SCNC: 24 MMOL/L — SIGNIFICANT CHANGE UP (ref 22–31)
CREAT SERPL-MCNC: 0.65 MG/DL — SIGNIFICANT CHANGE UP (ref 0.5–1.3)
EGFR: 124 ML/MIN/1.73M2 — SIGNIFICANT CHANGE UP
GLUCOSE BLDC GLUCOMTR-MCNC: 112 MG/DL — HIGH (ref 70–99)
GLUCOSE BLDC GLUCOMTR-MCNC: 118 MG/DL — HIGH (ref 70–99)
GLUCOSE BLDC GLUCOMTR-MCNC: 132 MG/DL — HIGH (ref 70–99)
GLUCOSE BLDC GLUCOMTR-MCNC: 82 MG/DL — SIGNIFICANT CHANGE UP (ref 70–99)
GLUCOSE SERPL-MCNC: 91 MG/DL — SIGNIFICANT CHANGE UP (ref 70–99)
HAV IGM SER-ACNC: SIGNIFICANT CHANGE UP
HBV CORE AB SER-ACNC: SIGNIFICANT CHANGE UP
HBV CORE IGM SER-ACNC: SIGNIFICANT CHANGE UP
HBV SURFACE AB SER-ACNC: SIGNIFICANT CHANGE UP
HBV SURFACE AG SER-ACNC: SIGNIFICANT CHANGE UP
HCT VFR BLD CALC: 38.6 % — SIGNIFICANT CHANGE UP (ref 34.5–45)
HCV AB S/CO SERPL IA: 0.04 S/CO — SIGNIFICANT CHANGE UP (ref 0–0.99)
HCV AB SERPL-IMP: SIGNIFICANT CHANGE UP
HGB BLD-MCNC: 12.1 G/DL — SIGNIFICANT CHANGE UP (ref 11.5–15.5)
MAGNESIUM SERPL-MCNC: 2.2 MG/DL — SIGNIFICANT CHANGE UP (ref 1.6–2.6)
MCHC RBC-ENTMCNC: 25.4 PG — LOW (ref 27–34)
MCHC RBC-ENTMCNC: 31.3 GM/DL — LOW (ref 32–36)
MCV RBC AUTO: 81.1 FL — SIGNIFICANT CHANGE UP (ref 80–100)
NRBC # BLD: 0 /100 WBCS — SIGNIFICANT CHANGE UP (ref 0–0)
PHOSPHATE SERPL-MCNC: 3.9 MG/DL — SIGNIFICANT CHANGE UP (ref 2.5–4.5)
PLATELET # BLD AUTO: 265 K/UL — SIGNIFICANT CHANGE UP (ref 150–400)
POTASSIUM SERPL-MCNC: 3.6 MMOL/L — SIGNIFICANT CHANGE UP (ref 3.5–5.3)
POTASSIUM SERPL-SCNC: 3.6 MMOL/L — SIGNIFICANT CHANGE UP (ref 3.5–5.3)
RBC # BLD: 4.76 M/UL — SIGNIFICANT CHANGE UP (ref 3.8–5.2)
RBC # FLD: 14.6 % — HIGH (ref 10.3–14.5)
SODIUM SERPL-SCNC: 137 MMOL/L — SIGNIFICANT CHANGE UP (ref 135–145)
WBC # BLD: 18.19 K/UL — HIGH (ref 3.8–10.5)
WBC # FLD AUTO: 18.19 K/UL — HIGH (ref 3.8–10.5)

## 2022-12-23 PROCEDURE — 99232 SBSQ HOSP IP/OBS MODERATE 35: CPT

## 2022-12-23 RX ORDER — SODIUM CHLORIDE 9 MG/ML
1000 INJECTION INTRAMUSCULAR; INTRAVENOUS; SUBCUTANEOUS
Refills: 0 | Status: DISCONTINUED | OUTPATIENT
Start: 2022-12-23 | End: 2022-12-29

## 2022-12-23 RX ADMIN — Medication 2000 UNIT(S): at 11:26

## 2022-12-23 RX ADMIN — Medication 60 MILLIGRAM(S): at 06:32

## 2022-12-23 RX ADMIN — SODIUM CHLORIDE 75 MILLILITER(S): 9 INJECTION INTRAMUSCULAR; INTRAVENOUS; SUBCUTANEOUS at 08:32

## 2022-12-23 RX ADMIN — ENOXAPARIN SODIUM 40 MILLIGRAM(S): 100 INJECTION SUBCUTANEOUS at 21:07

## 2022-12-23 RX ADMIN — PANTOPRAZOLE SODIUM 40 MILLIGRAM(S): 20 TABLET, DELAYED RELEASE ORAL at 11:27

## 2022-12-23 RX ADMIN — CHLORHEXIDINE GLUCONATE 1 APPLICATION(S): 213 SOLUTION TOPICAL at 11:29

## 2022-12-23 NOTE — PROGRESS NOTE ADULT - ASSESSMENT
Assessment: 27 year-old woman with a PMH of asthma presented on 12/14/22 with c/o acute vision loss OD of 2 days duration described as initial blurry vision in the inferior half of the visual field in the right eye, with progression to blurry vision in the entire right eye and darkening of the inferior half of the visual field in the right eye, for which Neurology was consulted. Neuro exam stable with blurred vision on superior quadrants on R eye and R APD. Completed solumedrol 1g IV extended to 7 days (12/14 - 12/20). S/p neurorads LP 12/19. S/p Shiley placement 12/19. PLEX started 12/20, received two days 12/20 & 12/22. Likely receiving third PLEX on 12/24.       Impression: Improving vision loss OD possibly due to optic neuritis vs optic neuropathy secondary to likely demyelinating / autoimmune condition.     Plan  [x] vit D3 2000 IU daily  [] Hep panel, NICOLE virus (pending collection)  [x] MRI brain & orbits w/wo contrast  [x] MRI C and T-Spine w/wo contrast   [x] Solumedrol 1g IV (12/14 - 12/20)  [x]SERUM: A1C, lipid panel, TSH, vitamin D 25-OH, B12, B9, ESR, pending crp  NMO ab, MOG, HAIDER, ANCA, ACE, Lyme Ab, quantiferon gold, RPR  [x] LP   [x] Ophthalmology evaluation, normal retinal exam with no disc edema  [x] s/p IR for shiley placement 12/19  [/] PLEX (day 2/5, started on 12/20).  Day 3 tomorrow, please draw coags, ionized calcium and fibrinogen prior to PLEX on treatment days    #Preventive Measures  - ISS & POCT glucose monitoring  - GI ppx with PPI while on high dose steroids  - Telemonitoring, Neurochecks/VS Q4H  - DVT ppx    Case discussed with Neuro Attending Dr. Epperson Assessment: 27 year-old woman with a PMH of asthma presented on 12/14/22 with c/o acute vision loss OD of 2 days duration described as initial blurry vision in the inferior half of the visual field in the right eye, with progression to blurry vision in the entire right eye and darkening of the inferior half of the visual field in the right eye, for which Neurology was consulted. Neuro exam stable with blurred vision on superior quadrants on R eye and R APD. Completed solumedrol 1g IV extended to 7 days (12/14 - 12/20). S/p neurorads LP 12/19. S/p Shiley placement 12/19. PLEX started 12/20, received two days 12/20 & 12/22. Likely receiving third PLEX on 12/24.       Impression: Improving vision loss OD possibly due to optic neuritis vs optic neuropathy secondary to likely demyelinating / autoimmune condition.     Plan  [x] vit D3 2000 IU daily  [] Hep panel, NICOLE virus (pending collection)  [x] MRI brain & orbits w/wo contrast  [x] MRI C and T-Spine w/wo contrast   [x] Solumedrol 1g IV (12/14 - 12/20)  [x]SERUM: A1C, lipid panel, TSH, vitamin D 25-OH, B12, B9, ESR, pending crp  NMO ab, MOG, HAIDER, ANCA, ACE, Lyme Ab, quantiferon gold, RPR  [x] LP   [x] Ophthalmology following, normal retinal exam with no disc edema  -improved from hand motion--> 20/400-->20/200 peripherally with improvement in confrontational visual field (12/22)  [x] s/p IR for shiley placement 12/19  [/] PLEX (day 2/5 complete, started on 12/20).  Day 2 tomorrow, please draw coags, ionized calcium and fibrinogen prior to PLEX on treatment days      #Preventive Measures  - ISS & POCT glucose monitoring  - GI ppx with PPI while on high dose steroids  - Telemonitoring, Neurochecks/VS Q4H  - DVT ppx  Case discussed with Neuro Attending Dr. Epperson

## 2022-12-23 NOTE — PROGRESS NOTE ADULT - ATTENDING COMMENTS
27 year old woman w/ hx of asthma, admitted for progressive vision loss OD over 1 week and diagnosed with acute optic neuritis based on clinical and MRI findings, s/p 7 days of IV solumedrol with mild improvement in symptoms.      no recent illness. no recent vaccination. No prior hx of clinical neurological attacks.      CTA H/N: negative  MRI brain w/w/o 12/15/2022- few T2 hyperintense white matter lesions- bilateral frontal lobes and R brachium pontis (these appear ovoid in shape and seems suspicious for demyelination. Could argue that the right frontal lobe lesion in a juxtacortical lesion). Non enhancing.   MRI orbits w/w/o 12/15/2022- mild abnormal signal and enhancement involving right optic nerve  MRI C and T spine w/w/o 12/15/2022- no cord lesions    Labs : ACE neg, HAIDER 1:320, ESR 53, CRP 9, Lyme neg, ANCA neg.           Serum MOG ab negative. NMO ab serum negative.   LP 12/19: < 1 cell, protein 28, PCR negative. no culture growth. OCB pending.     s/o:  Vision continues to improve more peripherally than centrally OD. Able to count finger fingers peripherally, and now able to make out shapes/figures centrally as well. No new symptoms. She was able to rest better yesterday. Tolerated second session of PLEX yesterday.     Imp: Acute optic neuritis OD with abnormal brain MRI- most consistent with clinically isolated syndrome at this time.     Given severity of vision loss, pt completed a full 7 days of IV solumedrol (last day 12/20),  now on prednisone taper (60 mg x 4 days, 40 mg x 4 days and then 20 mg x 4 day and then stop).   Judy line placed 12/19, will plan for 5 sessions of PLEX (every other day)- 1st session completed 12/19  Will follow up on CSF results, particularly OCB    Dispo: Discharge home after completion of PLEX. Will follow up with me as outpatient

## 2022-12-23 NOTE — PROGRESS NOTE ADULT - SUBJECTIVE AND OBJECTIVE BOX
Neurology Progress Note    SUBJECTIVE/OBJECTIVE/INTERVAL EVENTS:     INTERVAL HISTORY: 27 year-old woman with a PMH of asthma presented on 12/14/22 with c/o acute vision loss OD of 2 days duration described as initial blurry vision in the inferior half of the visual field in the right eye, with progression to blurry vision in the entire right eye and darkening of the inferior half of the visual field in the right eye, for which Neurology was consulted. Neuro exam stable with blurred vision on superior quadrants on R eye and R APD. Completed solumedrol 1g IV extended to 7 days (12/14 - 12/20). S/p neurorads LP 12/19. S/p Shiley placement 12/19. PLEX started 12/20, received two days 12/20 & 12/22. Likely receiving third PLEX on 12/24.     MEDICATIONS  (STANDING):  chlorhexidine 4% Liquid 1 Application(s) Topical <User Schedule>  dextrose 5%. 1000 milliLiter(s) (100 mL/Hr) IV Continuous <Continuous>  dextrose 5%. 1000 milliLiter(s) (50 mL/Hr) IV Continuous <Continuous>  dextrose 50% Injectable 25 Gram(s) IV Push once  dextrose 50% Injectable 12.5 Gram(s) IV Push once  dextrose 50% Injectable 25 Gram(s) IV Push once  General:  Constitutional: Female, appears stated age   Head: Normocephalic; Eyes: clear sclera;   Extremities: No cyanosis; Skin: No fabio edema of LE  Resp: breathing comfortably       enoxaparin Injectable 40 milliGRAM(s) SubCutaneous every 24 hours  glucagon  Injectable 1 milliGRAM(s) IntraMuscular once  insulin lispro (ADMELOG) corrective regimen sliding scale   SubCutaneous three times a day before meals  insulin lispro (ADMELOG) corrective regimen sliding scale   SubCutaneous at bedtime  lidocaine 1% (Preservative-free) Injectable 40 milliLiter(s) Local Injection once  pantoprazole    Tablet 40 milliGRAM(s) Oral daily  predniSONE   Tablet 60 milliGRAM(s) Oral daily    MEDICATIONS  (PRN):  acetaminophen     Tablet .. 650 milliGRAM(s) Oral every 6 hours PRN Temp greater or equal to 38C (100.4F), Mild Pain (1 - 3)  dextrose Oral Gel 15 Gram(s) Oral once PRN Blood Glucose LESS THAN 70 milliGRAM(s)/deciliter  sodium chloride 0.9% lock flush 10 milliLiter(s) IV Push every 1 hour PRN Pre/post blood products, medications, blood draw, and to maintain line patency      VITALS & EXAMINATION:  Vital Signs Last 24 Hrs  T(C): 36.7 (21 Dec 2022 05:00), Max: 36.8 (20 Dec 2022 09:11)  T(F): 98 (21 Dec 2022 05:00), Max: 98.3 (20 Dec 2022 14:04)  HR: 80 (21 Dec 2022 05:00) (64 - 82)  BP: 106/70 (21 Dec 2022 05:00) (99/63 - 115/73)  BP(mean): --  RR: 18 (21 Dec 2022 05:00) (18 - 18)  SpO2: 99% (21 Dec 2022 05:00) (97% - 99%)    Parameters below as of 21 Dec 2022 05:00  Patient On (Oxygen Delivery Method): room air    Neurological (>12):  MS: Awake, alert.  Follows commands. Attends to examiner  Language: Speech is hypophonic, clear, fluent, good repetition,  comprehension, registration of words.  CNs: PERRL (R 3mm, L 3mm). left visual field defect. right visual field intact. EOMI. V1-3 intact LT, No facial asymmetry b/l. Hearing grossly normal b/l. Tongue midline.     Motor - Normal bulk and tone throughout. No pronator drift.    L/R         Deltoid  5/5    Biceps   5/5      Triceps  5/5         Wrist Extension 5/5   Wrist Flexion  5/5      5/5   L/R         Hip Flexion  5/5    Hip Extension  5/5  Knee Extension  5/5  Dorsiflexion  5/5      Plantar Flexion 5/5     Sensation: Intact to LT b/l. Cortical: Extinction on DSS (neglect): none  Reflexes L/R:  Biceps(C5) 2/2  BR(C6) 2/2   Triceps(C7)  2/2 Patellar(L4)   2/2   Toes: mute  Coordination: No dysmetria to FTN b/l UE  Gait: Normal Romberg. No postural instability. Normal stance.     LABORATORY:  CBC                       13.1   12.62 )-----------( 368      ( 20 Dec 2022 07:16 )             40.6     Chem 12-20    136  |  100  |  17  ----------------------------<  90  4.3   |  26  |  0.63    Ca    9.1      20 Dec 2022 07:16  Phos  4.8     12-20  Mg     2.5     12-20    TPro  6.8  /  Alb  3.7  /  TBili  0.7  /  DBili  x   /  AST  15  /  ALT  68<H>  /  AlkPhos  73  12-20    LFTs LIVER FUNCTIONS - ( 20 Dec 2022 07:16 )  Alb: 3.7 g/dL / Pro: 6.8 g/dL / ALK PHOS: 73 U/L / ALT: 68 U/L / AST: 15 U/L / GGT: x           Coagulopathy PT/INR - ( 20 Dec 2022 07:16 )   PT: 13.1 sec;   INR: 1.14 ratio      PTT - ( 20 Dec 2022 07:16 )  PTT:23.3 sec  Lipid Panel 12-15 Chol 238<H> LDL -- HDL 55 Trig 37  A1C (5.4)  LDL (176), cholesterol 238  TSH (0.65)   vitamin D 25-OH (36.4)  B12 (722), B9 (8.5)  ESR (54, elevated)  NMO ab (-), MOG (-)   HAIDER (1:320)   c- & p- ANCA (-), ACE (38), Lyme Ab (-), quantiferon gold(-), RPR (-)    LP   total nucleated cells <1, RBC 2, neutrophil (-), LDH (-)         CSF glu 75(elevated), Protein 28 (wnl)  Cytopathology: NEGATIVE FOR MALIGNANT CELLS.  Lymphocytes and mononuclear cells.  CSF PCR (-) gram stain & culture (-)    Radiology (XR, CT, MR, U/S, TTE/MADELINE):  < from: MR Head w/wo IV Cont (12.15.22 @ 20:26) >  IMPRESSION:  Mild abnormal enhancement and T2 hyperintensity involving the right optic   nerve compatible with optic neuritis.    Multiple foci of white matter T2 hyperintensity which are nonspecific in   appearance. Demyelinating disease is a prime consideration although other   possibilities are not excluded.    < from: MR Thoracic Spine w/wo IV Cont (12.15.22 @ 18:50) >  IMPRESSION: No abnormal spinal cord lesions are identified.

## 2022-12-24 LAB
ANION GAP SERPL CALC-SCNC: 13 MMOL/L — SIGNIFICANT CHANGE UP (ref 5–17)
BUN SERPL-MCNC: 9 MG/DL — SIGNIFICANT CHANGE UP (ref 7–23)
CA-I BLD-SCNC: 1.14 MMOL/L — LOW (ref 1.15–1.33)
CALCIUM SERPL-MCNC: 8.7 MG/DL — SIGNIFICANT CHANGE UP (ref 8.4–10.5)
CHLORIDE SERPL-SCNC: 104 MMOL/L — SIGNIFICANT CHANGE UP (ref 96–108)
CO2 SERPL-SCNC: 26 MMOL/L — SIGNIFICANT CHANGE UP (ref 22–31)
CREAT SERPL-MCNC: 0.63 MG/DL — SIGNIFICANT CHANGE UP (ref 0.5–1.3)
EGFR: 125 ML/MIN/1.73M2 — SIGNIFICANT CHANGE UP
FIBRINOGEN PPP-MCNC: 241 MG/DL — SIGNIFICANT CHANGE UP (ref 200–445)
GAMMA INTERFERON BACKGROUND BLD IA-ACNC: 0.02 IU/ML — SIGNIFICANT CHANGE UP
GLUCOSE BLDC GLUCOMTR-MCNC: 100 MG/DL — HIGH (ref 70–99)
GLUCOSE BLDC GLUCOMTR-MCNC: 114 MG/DL — HIGH (ref 70–99)
GLUCOSE BLDC GLUCOMTR-MCNC: 169 MG/DL — HIGH (ref 70–99)
GLUCOSE BLDC GLUCOMTR-MCNC: 99 MG/DL — SIGNIFICANT CHANGE UP (ref 70–99)
GLUCOSE SERPL-MCNC: 76 MG/DL — SIGNIFICANT CHANGE UP (ref 70–99)
HCT VFR BLD CALC: 36.8 % — SIGNIFICANT CHANGE UP (ref 34.5–45)
HGB BLD-MCNC: 11.8 G/DL — SIGNIFICANT CHANGE UP (ref 11.5–15.5)
M TB IFN-G BLD-IMP: NEGATIVE — SIGNIFICANT CHANGE UP
M TB IFN-G CD4+ BCKGRND COR BLD-ACNC: 0 IU/ML — SIGNIFICANT CHANGE UP
M TB IFN-G CD4+CD8+ BCKGRND COR BLD-ACNC: 0 IU/ML — SIGNIFICANT CHANGE UP
MCHC RBC-ENTMCNC: 26 PG — LOW (ref 27–34)
MCHC RBC-ENTMCNC: 32.1 GM/DL — SIGNIFICANT CHANGE UP (ref 32–36)
MCV RBC AUTO: 81.2 FL — SIGNIFICANT CHANGE UP (ref 80–100)
NRBC # BLD: 0 /100 WBCS — SIGNIFICANT CHANGE UP (ref 0–0)
PLATELET # BLD AUTO: 277 K/UL — SIGNIFICANT CHANGE UP (ref 150–400)
POTASSIUM SERPL-MCNC: 4 MMOL/L — SIGNIFICANT CHANGE UP (ref 3.5–5.3)
POTASSIUM SERPL-SCNC: 4 MMOL/L — SIGNIFICANT CHANGE UP (ref 3.5–5.3)
QUANT TB PLUS MITOGEN MINUS NIL: >10 IU/ML — SIGNIFICANT CHANGE UP
RBC # BLD: 4.53 M/UL — SIGNIFICANT CHANGE UP (ref 3.8–5.2)
RBC # FLD: 14.9 % — HIGH (ref 10.3–14.5)
SODIUM SERPL-SCNC: 143 MMOL/L — SIGNIFICANT CHANGE UP (ref 135–145)
WBC # BLD: 20.06 K/UL — HIGH (ref 3.8–10.5)
WBC # FLD AUTO: 20.06 K/UL — HIGH (ref 3.8–10.5)

## 2022-12-24 PROCEDURE — 36514 APHERESIS PLASMA: CPT

## 2022-12-24 PROCEDURE — 99233 SBSQ HOSP IP/OBS HIGH 50: CPT

## 2022-12-24 RX ADMIN — CHLORHEXIDINE GLUCONATE 1 APPLICATION(S): 213 SOLUTION TOPICAL at 08:55

## 2022-12-24 RX ADMIN — Medication 60 MILLIGRAM(S): at 05:40

## 2022-12-24 RX ADMIN — Medication 1: at 13:02

## 2022-12-24 RX ADMIN — Medication 2000 UNIT(S): at 11:20

## 2022-12-24 RX ADMIN — PANTOPRAZOLE SODIUM 40 MILLIGRAM(S): 20 TABLET, DELAYED RELEASE ORAL at 11:20

## 2022-12-24 NOTE — PROGRESS NOTE ADULT - SUBJECTIVE AND OBJECTIVE BOX
[facility] DEPARTMENT OF OPHTHALMOLOGY  ------------------------------------------------------------------------------  Radha Mathis MD PGY 2  Contact via [employer] Teams  ------------------------------------------------------------------------------    Interval History: No acute events overnight. Today patient notes continued blurring of central vision OD. Clear peripheral vision OD. No symptoms OS.     MEDICATIONS  (STANDING):  chlorhexidine 4% Liquid 1 Application(s) Topical <User Schedule>  cholecalciferol 2000 Unit(s) Oral daily  dextrose 5%. 1000 milliLiter(s) (100 mL/Hr) IV Continuous <Continuous>  dextrose 5%. 1000 milliLiter(s) (50 mL/Hr) IV Continuous <Continuous>  dextrose 50% Injectable 25 Gram(s) IV Push once  dextrose 50% Injectable 12.5 Gram(s) IV Push once  dextrose 50% Injectable 25 Gram(s) IV Push once  enoxaparin Injectable 40 milliGRAM(s) SubCutaneous every 24 hours  glucagon  Injectable 1 milliGRAM(s) IntraMuscular once  insulin lispro (ADMELOG) corrective regimen sliding scale   SubCutaneous three times a day before meals  insulin lispro (ADMELOG) corrective regimen sliding scale   SubCutaneous at bedtime  lidocaine 1% (Preservative-free) Injectable 40 milliLiter(s) Local Injection once  pantoprazole    Tablet 40 milliGRAM(s) Oral daily  sodium chloride 0.9%. 1000 milliLiter(s) (75 mL/Hr) IV Continuous <Continuous>    MEDICATIONS  (PRN):  acetaminophen     Tablet .. 650 milliGRAM(s) Oral every 6 hours PRN Temp greater or equal to 38C (100.4F), Mild Pain (1 - 3)  dextrose Oral Gel 15 Gram(s) Oral once PRN Blood Glucose LESS THAN 70 milliGRAM(s)/deciliter  sodium chloride 0.9% lock flush 10 milliLiter(s) IV Push every 1 hour PRN Pre/post blood products, medications, blood draw, and to maintain line patency      VITALS: T(C): 36.8 (12-24-22 @ 13:57)  T(F): 98.3 (12-24-22 @ 13:57), Max: 98.3 (12-24-22 @ 13:57)  HR: 83 (12-24-22 @ 13:57) (77 - 88)  BP: 100/67 (12-24-22 @ 13:57) (100/65 - 110/70)  RR:  (18 - 18)  SpO2:  (98% - 100%)  Wt(kg): --  General: AAO x 3, appropriate mood and affect      Ophthalmology Exam:  Visual acuity (sc): 20/200 peripherally, CF 3' centrally OD, 20/20 OS  Pupils: PERRL OU,  RAPD OD  Extraocular movements (EOMs): Full OU, no pain, no diplopia  Confrontational Visual Field (CVF): OD full, full OS  Color plates: OD 0/12 OS 12/12    Pen Light Exam (PLE)  External: Flat OU  Lids/Lashes/Lacrimal Ducts: Flat OU    Sclera/Conjunctiva: W+Q OU  Cornea: Cl OU  Anterior Chamber: D+F OU    Iris: Flat OU  Lens: Cl OU      MOG Antibody Reflex to Titer (12.15.22 @ 07:30)    MOG Antibody CBA, Serum: Negative: No informative autoantibodies were detected in this  evaluation. A negative result does not preclude a diagnosis  of an inflammatory CNS demyelinating disorder.  -------------------ADDITIONAL INFORMATION-------------------  This test was developed and its performance characteristics  determined by [facility] in a manner consistent with CLIA  requirements. This test has not been cleared or approved by  the U.S. Food and Drug Administration.  Test Performed by:  [address] [address]  : Jose Spivey M.D. Ph.D.; CLIA# [id number]    Neuromyelitis Optica Antibody (12.15.22 @ 07:30)    Neuromyelitis Optica Antibody: Negative: Recommend repeat testing in 6 months if clinical suspicion  is high. Negative result can occur in the setting of  immunosuppression.  -------------------ADDITIONAL INFORMATION-------------------  This test was developed and its performance characteristics  determined by [facility] in a manner consistent with CLIA  requirements. This test has not been cleared or approved by  the U.S. Food and Drug Administration.  Test Performed by:  [address] [address]  : Jose Spivey M.D. Ph.D.; CLIA# [id number]       [facility] DEPARTMENT OF OPHTHALMOLOGY  ------------------------------------------------------------------------------  Radha Mathis MD PGY 2  Contact via [employer] Teams  ------------------------------------------------------------------------------    Interval History: No acute events overnight. Today patient notes continued blurring of central vision OD. Clear peripheral vision OD. No symptoms OS.     MEDICATIONS  (STANDING):  chlorhexidine 4% Liquid 1 Application(s) Topical <User Schedule>  cholecalciferol 2000 Unit(s) Oral daily  dextrose 5%. 1000 milliLiter(s) (100 mL/Hr) IV Continuous <Continuous>  dextrose 5%. 1000 milliLiter(s) (50 mL/Hr) IV Continuous <Continuous>  dextrose 50% Injectable 25 Gram(s) IV Push once  dextrose 50% Injectable 12.5 Gram(s) IV Push once  dextrose 50% Injectable 25 Gram(s) IV Push once  enoxaparin Injectable 40 milliGRAM(s) SubCutaneous every 24 hours  glucagon  Injectable 1 milliGRAM(s) IntraMuscular once  insulin lispro (ADMELOG) corrective regimen sliding scale   SubCutaneous three times a day before meals  insulin lispro (ADMELOG) corrective regimen sliding scale   SubCutaneous at bedtime  lidocaine 1% (Preservative-free) Injectable 40 milliLiter(s) Local Injection once  pantoprazole    Tablet 40 milliGRAM(s) Oral daily  sodium chloride 0.9%. 1000 milliLiter(s) (75 mL/Hr) IV Continuous <Continuous>    MEDICATIONS  (PRN):  acetaminophen     Tablet .. 650 milliGRAM(s) Oral every 6 hours PRN Temp greater or equal to 38C (100.4F), Mild Pain (1 - 3)  dextrose Oral Gel 15 Gram(s) Oral once PRN Blood Glucose LESS THAN 70 milliGRAM(s)/deciliter  sodium chloride 0.9% lock flush 10 milliLiter(s) IV Push every 1 hour PRN Pre/post blood products, medications, blood draw, and to maintain line patency      VITALS: T(C): 36.8 (12-24-22 @ 13:57)  T(F): 98.3 (12-24-22 @ 13:57), Max: 98.3 (12-24-22 @ 13:57)  HR: 83 (12-24-22 @ 13:57) (77 - 88)  BP: 100/67 (12-24-22 @ 13:57) (100/65 - 110/70)  RR:  (18 - 18)  SpO2:  (98% - 100%)  Wt(kg): --  General: AAO x 3, appropriate mood and affect      Ophthalmology Exam:  Visual acuity (sc): 20/200 peripherally, CF 3' centrally OD, 20/20 OS  Pupils: PERRL OU,  RAPD OD  Extraocular movements (EOMs): Full OU, no pain, no diplopia  Confrontational Visual Field (CVF): OD full, possible central resolving scotoma, full OS  Color plates: OD 0/12 OS 12/12    Pen Light Exam (PLE)  External: Flat OU  Lids/Lashes/Lacrimal Ducts: Flat OU    Sclera/Conjunctiva: W+Q OU  Cornea: Cl OU  Anterior Chamber: D+F OU    Iris: Flat OU  Lens: Cl OU      MOG Antibody Reflex to Titer (12.15.22 @ 07:30)    MOG Antibody CBA, Serum: Negative: No informative autoantibodies were detected in this  evaluation. A negative result does not preclude a diagnosis  of an inflammatory CNS demyelinating disorder.  -------------------ADDITIONAL INFORMATION-------------------  This test was developed and its performance characteristics  determined by [facility] in a manner consistent with CLIA  requirements. This test has not been cleared or approved by  the U.S. Food and Drug Administration.  Test Performed by:  [address] [address]  : Jose Spivey M.D. Ph.D.; CLIA# [id number]    Neuromyelitis Optica Antibody (12.15.22 @ 07:30)    Neuromyelitis Optica Antibody: Negative: Recommend repeat testing in 6 months if clinical suspicion  is high. Negative result can occur in the setting of  immunosuppression.  -------------------ADDITIONAL INFORMATION-------------------  This test was developed and its performance characteristics  determined by [facility] in a manner consistent with CLIA  requirements. This test has not been cleared or approved by  the U.S. Food and Drug Administration.  Test Performed by:  [address] [address]  : Jose Spivey M.D. Ph.D.; CLIA# [id number]

## 2022-12-24 NOTE — PROGRESS NOTE ADULT - ASSESSMENT
27y female w/ pmhx/ochx of asthma presenting with inferior visual field cut of 1 day duration. Examined by outpatient general ophthalmologist and retina specialist with no abnormal retinal findings. Undergoing workup for optic neuritis OD.     # Optic neuritis right eye  - normal retinal exam with no disc edema - confirmed on outpatient OCT RNFL. Inferior hemifield defect confirmed on outpatient HVF testing.   - MR brain and orbits with enhancement optic nerve and multiple enhancing lesions of white matter - consistent with optic neuritis and demyelinating disease  - atypical workup thus far negative  - NMO and MOG serum Abs negative. CSF ab pending.   - s/p IV steroids with no improvement, s/p 3rd dose of PLEX with improvement from HM --20/400-->20/200 peripherally with improvement in confrontational visual field   - Continue PLEX per neurology  - Will follow    Outpatient follow-up: Patient should follow-up with his/her ophthalmologist or with [facility] Department of Ophthalmology at the address below     [address] [place] [address]. [address]  [place] [address] [phone]

## 2022-12-24 NOTE — PROGRESS NOTE ADULT - ATTENDING COMMENTS
I have examined the pt at bedside.  The risks and the benefits of the proposed diagnostic/therapeutic approach were thoroughly discussed.   I agree with the above plan and have modified it where necessary.    27 year-old woman with acute vision loss OD, blurred vision on superior quadrants on R eye and R APD.   ON.  On solumedrol 1g IV extended to 7 days (12/14 - 12/20) and PLEX.  Improving.    Impression: ON.  Continue PLEX and follow rest of pending labs.

## 2022-12-24 NOTE — CHART NOTE - NSCHARTNOTEFT_GEN_A_CORE
The patient is a 27 year-old woman with a PMH of asthma brought in by family to the ED on 12/14/22 with c/o acute vision loss OD of 2 days duration described as initial blurry vision in the inferior half of the visual field in the right eye, with progression to blurry vision in the entire right eye and darkening of the inferior half of the visual field in the right eye, for which Neurology was consulted. Neuro exam stable with blurred vision on superior quadrants on R eye and R APD. Completed solumedrol 1g IV extended to 7 days (12/14 - 12/20). Plan for PLEX given no significant improvement. BB contacted to initiate PLEX. A course of 5 PLEX over 10 days planned. Plan of care discussed with the patient and neurology team.     PLEX#1 on 12/20/22, 1PV with 5% albumin as replacement fluid. Pt tolerated the procedure well.    PLEX#2 on 12/22/22. 1 plasma volume with 5% albumin as replacement fluid. Pt tolerated the procedure well.     PLEX#3 scheduled on 12/24/22.     PLEX#4 scheduled on 12/26/22. Please monitor pt CBC, Fibrinogen, and iCa prior to the procedure. The patient is a 27 year-old woman with a PMH of asthma brought in by family to the ED on 12/14/22 with c/o acute vision loss OD of 2 days duration described as initial blurry vision in the inferior half of the visual field in the right eye, with progression to blurry vision in the entire right eye and darkening of the inferior half of the visual field in the right eye, for which Neurology was consulted. Neuro exam stable with blurred vision on superior quadrants on R eye and R APD. Completed solumedrol 1g IV extended to 7 days (12/14 - 12/20). Plan for PLEX given no significant improvement. BB contacted to initiate PLEX. A course of 5 PLEX over 10 days planned. Plan of care discussed with the patient and neurology team.     PLEX#1 on 12/20/22, 1PV with 5% albumin as replacement fluid. Pt tolerated the procedure well.    PLEX#2 on 12/22/22. 1PV with 5% albumin as replacement fluid. Pt tolerated the procedure well.     PLEX#3 on 12/24/22. 1PV with 5% albumin as replacement fluid. Pt tolerated the procedure well.     PLEX#4 scheduled on 12/26/22. Please monitor CBC, fibrinogen, and iCa prior to the procedure. The patient is a 27 year-old woman with a PMH of asthma brought in by family to the ED on 12/14/22 with c/o acute vision loss OD of 2 days duration described as initial blurry vision in the inferior half of the visual field in the right eye, with progression to blurry vision in the entire right eye and darkening of the inferior half of the visual field in the right eye, for which Neurology was consulted. Neuro exam stable with blurred vision on superior quadrants on R eye and R APD. Completed solumedrol 1g IV extended to 7 days (12/14 - 12/20). Plan for PLEX given no significant improvement. BB contacted to initiate PLEX. A course of 5 PLEX over 10 days planned. Plan of care discussed with the patient and neurology team.     PLEX#1 on 12/20/22, 1PV with 5% albumin as replacement fluid. Pt tolerated the procedure well.    PLEX#2 on 12/22/22. 1PV with 5% albumin as replacement fluid. Pt tolerated the procedure well.     PLEX#3 on 12/24/22. 1PV with 5% albumin as replacement fluid. Pt tolerated the procedure well. Per neurology notes, improving vision loss OD possibly due to optic neuritis vs optic neuropathy secondary to likely demyelinating / autoimmune condition. Negative for NMO/MOG.     PLEX#4 scheduled on 12/26/22. Please monitor CBC, fibrinogen, and iCa prior to the procedure.

## 2022-12-24 NOTE — PROGRESS NOTE ADULT - SUBJECTIVE AND OBJECTIVE BOX
Neurology Progress Note    SUBJECTIVE/OBJECTIVE/INTERVAL EVENTS: Patient seen and examined at bedside w/ neuro attending and team.    MEDICATIONS  (STANDING):  chlorhexidine 4% Liquid 1 Application(s) Topical <User Schedule>  cholecalciferol 2000 Unit(s) Oral daily  dextrose 5%. 1000 milliLiter(s) (50 mL/Hr) IV Continuous <Continuous>  dextrose 5%. 1000 milliLiter(s) (100 mL/Hr) IV Continuous <Continuous>  dextrose 50% Injectable 25 Gram(s) IV Push once  dextrose 50% Injectable 12.5 Gram(s) IV Push once  dextrose 50% Injectable 25 Gram(s) IV Push once  enoxaparin Injectable 40 milliGRAM(s) SubCutaneous every 24 hours  glucagon  Injectable 1 milliGRAM(s) IntraMuscular once  insulin lispro (ADMELOG) corrective regimen sliding scale   SubCutaneous three times a day before meals  insulin lispro (ADMELOG) corrective regimen sliding scale   SubCutaneous at bedtime  lidocaine 1% (Preservative-free) Injectable 40 milliLiter(s) Local Injection once  pantoprazole    Tablet 40 milliGRAM(s) Oral daily  sodium chloride 0.9%. 1000 milliLiter(s) (75 mL/Hr) IV Continuous <Continuous>    MEDICATIONS  (PRN):  acetaminophen     Tablet .. 650 milliGRAM(s) Oral every 6 hours PRN Temp greater or equal to 38C (100.4F), Mild Pain (1 - 3)  dextrose Oral Gel 15 Gram(s) Oral once PRN Blood Glucose LESS THAN 70 milliGRAM(s)/deciliter  sodium chloride 0.9% lock flush 10 milliLiter(s) IV Push every 1 hour PRN Pre/post blood products, medications, blood draw, and to maintain line patency      VITALS & EXAMINATION:  Vital Signs Last 24 Hrs  T(C): 36.7 (24 Dec 2022 05:14), Max: 37 (23 Dec 2022 10:36)  T(F): 98 (24 Dec 2022 05:14), Max: 98.6 (23 Dec 2022 10:36)  HR: 85 (24 Dec 2022 05:14) (70 - 85)  BP: 100/65 (24 Dec 2022 05:14) (93/59 - 110/70)  BP(mean): --  RR: 18 (24 Dec 2022 05:14) (18 - 18)  SpO2: 99% (24 Dec 2022 05:14) (98% - 100%)    Parameters below as of 24 Dec 2022 05:14  Patient On (Oxygen Delivery Method): room air       General:  Constitutional: Female, appears stated age, in no apparent distress including pain  Head: Normocephalic & atraumatic.  Resp: breathing regularly.    Neurological (>12):  MS: Eyes open. Responds to verbal stimuli. Awake, alert, oriented to person, place, situation, time. Normal affect. Follows all commands.  Language: Speech is clear, fluent, repetition, comprehension intact    CNs: R APD, PERRL on L. 3 mm pupil b/l. Blurred vision on top half of visual field throughout in R eye, worse on top than bottom. Slowly improving, able to count fingers in peripheral vision but not centrally. EOMI no nystagmus, no diplopia. V1-3 intact to LT. No facial asymmetry b/l. Hearing intact b/l. Tongue/palate midline. Trap 5/5 b/l  Motor: Normal muscle bulk & tone. No noticeable tremor or seizure. No pronator drift. Biceps strength somewhat limited due to IV on L            Biceps	   R	5	5	 	  L	5	5	    	H-Flex D-Flex	P-Flex  R	5	5	5		   L	5	5	5	  	  Sensation: Intact to LT b/l throughout.     Cortical: Extinction on DSS (neglect): none    Reflexes:              Biceps(C5)       BR(C6)     Triceps(C7)               Patellar(L4)    Achilles(S1)    Plantar Resp  R	2	          2	             2		        2		    2		Down   L	2	          2	             2		        2		    2		Down     Coordination: intact rapid-alt movements. No dysmetria to FTN    Gait and station: baseline ambulation     LABORATORY:  CBC                       12.1   18.19 )-----------( 265      ( 23 Dec 2022 07:41 )             38.6     Chem 12-23    137  |  102  |  12  ----------------------------<  91  3.6   |  24  |  0.65    Ca    8.5      23 Dec 2022 07:41  Phos  3.9     12-23  Mg     2.2     12-23    TPro  5.8<L>  /  Alb  4.1  /  TBili  0.6  /  DBili  x   /  AST  12  /  ALT  63<H>  /  AlkPhos  49  12-22    LFTs LIVER FUNCTIONS - ( 22 Dec 2022 07:21 )  Alb: 4.1 g/dL / Pro: 5.8 g/dL / ALK PHOS: 49 U/L / ALT: 63 U/L / AST: 12 U/L / GGT: x           Coagulopathy PT/INR - ( 22 Dec 2022 07:21 )   PT: 12.5 sec;   INR: 1.09 ratio         PTT - ( 22 Dec 2022 07:21 )  PTT:25.9 sec  Lipid Panel 12-15 Chol 238<H> LDL -- HDL 55 Trig 37  A1c   Cardiac enzymes     U/A   CSF  Immunological  Other    STUDIES & IMAGING: (EEG, CT, MR, U/S, TTE/MADELINE):      LP   total nucleated cells <1, RBC 2, neutrophil (-), LDH (-)         CSF glu 75(elevated), Protein 28 (wnl)  Cytopathology: NEGATIVE FOR MALIGNANT CELLS.  Lymphocytes and mononuclear cells.  CSF PCR (-) gram stain & culture (-)    Radiology (XR, CT, MR, U/S, TTE/MADELINE):  < from: MR Head w/wo IV Cont (12.15.22 @ 20:26) >  IMPRESSION:  Mild abnormal enhancement and T2 hyperintensity involving the right optic   nerve compatible with optic neuritis.    Multiple foci of white matter T2 hyperintensity which are nonspecific in   appearance. Demyelinating disease is a prime consideration although other   possibilities are not excluded.    < from: MR Thoracic Spine w/wo IV Cont (12.15.22 @ 18:50) >  IMPRESSION: No abnormal spinal cord lesions are identified.   Neurology Progress Note    SUBJECTIVE/OBJECTIVE/INTERVAL EVENTS: Patient seen and examined at bedside w/ neuro attending and team. Reports some blurry vision in R eye diffusely in the AM superimposed on her central vision loss, however she will monitor for improvement today. No fabio worsening in vision. Plan for PLEX #3 today.      MEDICATIONS  (STANDING):  chlorhexidine 4% Liquid 1 Application(s) Topical <User Schedule>  cholecalciferol 2000 Unit(s) Oral daily  dextrose 5%. 1000 milliLiter(s) (50 mL/Hr) IV Continuous <Continuous>  dextrose 5%. 1000 milliLiter(s) (100 mL/Hr) IV Continuous <Continuous>  dextrose 50% Injectable 25 Gram(s) IV Push once  dextrose 50% Injectable 12.5 Gram(s) IV Push once  dextrose 50% Injectable 25 Gram(s) IV Push once  enoxaparin Injectable 40 milliGRAM(s) SubCutaneous every 24 hours  glucagon  Injectable 1 milliGRAM(s) IntraMuscular once  insulin lispro (ADMELOG) corrective regimen sliding scale   SubCutaneous three times a day before meals  insulin lispro (ADMELOG) corrective regimen sliding scale   SubCutaneous at bedtime  lidocaine 1% (Preservative-free) Injectable 40 milliLiter(s) Local Injection once  pantoprazole    Tablet 40 milliGRAM(s) Oral daily  sodium chloride 0.9%. 1000 milliLiter(s) (75 mL/Hr) IV Continuous <Continuous>    MEDICATIONS  (PRN):  acetaminophen     Tablet .. 650 milliGRAM(s) Oral every 6 hours PRN Temp greater or equal to 38C (100.4F), Mild Pain (1 - 3)  dextrose Oral Gel 15 Gram(s) Oral once PRN Blood Glucose LESS THAN 70 milliGRAM(s)/deciliter  sodium chloride 0.9% lock flush 10 milliLiter(s) IV Push every 1 hour PRN Pre/post blood products, medications, blood draw, and to maintain line patency    VITALS & EXAMINATION:  Vital Signs Last 24 Hrs  T(C): 36.7 (24 Dec 2022 05:14), Max: 37 (23 Dec 2022 10:36)  T(F): 98 (24 Dec 2022 05:14), Max: 98.6 (23 Dec 2022 10:36)  HR: 85 (24 Dec 2022 05:14) (70 - 85)  BP: 100/65 (24 Dec 2022 05:14) (93/59 - 110/70)  BP(mean): --  RR: 18 (24 Dec 2022 05:14) (18 - 18)  SpO2: 99% (24 Dec 2022 05:14) (98% - 100%)    Parameters below as of 24 Dec 2022 05:14  Patient On (Oxygen Delivery Method): room air    General:  Constitutional: Female, appears stated age, in no apparent distress including pain  Head: Normocephalic & atraumatic.  Resp: breathing regularly.    Neurological (>12):  MS: Eyes open. Responds to verbal stimuli. Awake, alert, oriented to person, place, situation, time. Normal affect. Follows all commands.  Language: Speech is clear, fluent, repetition, comprehension intact    CNs: R APD, PERRL on L. 3 mm pupil b/l. Blurred vision on top half of visual field throughout in R eye, worse on top than bottom. Able to count fingers in peripheral vision but not centrally. EOMI no nystagmus, no diplopia. V1-3 intact to LT. No facial asymmetry b/l. Hearing intact b/l. Tongue/palate midline. Trap 5/5 b/l  Motor: Normal muscle bulk & tone. No noticeable tremor or seizure. No pronator drift. Biceps strength somewhat limited due to IV on L            Biceps	   R	5	5	 	  L	5	5	    	H-Flex D-Flex	P-Flex  R	5	5	5		   L	5	5	5	  	  Sensation: Intact to LT b/l throughout.   Cortical: Extinction on DSS (neglect): none  Coordination: intact rapid-alt movements. No dysmetria to FTN  Gait and station: baseline ambulation     LABORATORY:  CBC                       12.1   18.19 )-----------( 265      ( 23 Dec 2022 07:41 )             38.6     Chem 12-23    137  |  102  |  12  ----------------------------<  91  3.6   |  24  |  0.65    Ca    8.5      23 Dec 2022 07:41  Phos  3.9     12-23  Mg     2.2     12-23    TPro  5.8<L>  /  Alb  4.1  /  TBili  0.6  /  DBili  x   /  AST  12  /  ALT  63<H>  /  AlkPhos  49  12-22    LFTs LIVER FUNCTIONS - ( 22 Dec 2022 07:21 )  Alb: 4.1 g/dL / Pro: 5.8 g/dL / ALK PHOS: 49 U/L / ALT: 63 U/L / AST: 12 U/L / GGT: x           Coagulopathy PT/INR - ( 22 Dec 2022 07:21 )   PT: 12.5 sec;   INR: 1.09 ratio         PTT - ( 22 Dec 2022 07:21 )  PTT:25.9 sec  Lipid Panel 12-15 Chol 238<H> LDL -- HDL 55 Trig 37  A1c   Cardiac enzymes     U/A   CSF  Immunological  Other    STUDIES & IMAGING: (EEG, CT, MR, U/S, TTE/MADELINE):      LP   total nucleated cells <1, RBC 2, neutrophil (-), LDH (-)         CSF glu 75(elevated), Protein 28 (wnl)  Cytopathology: NEGATIVE FOR MALIGNANT CELLS.  Lymphocytes and mononuclear cells.  CSF PCR (-) gram stain & culture (-)    Radiology (XR, CT, MR, U/S, TTE/MADELINE):  < from: MR Head w/wo IV Cont (12.15.22 @ 20:26) >  IMPRESSION:  Mild abnormal enhancement and T2 hyperintensity involving the right optic   nerve compatible with optic neuritis.    Multiple foci of white matter T2 hyperintensity which are nonspecific in   appearance. Demyelinating disease is a prime consideration although other   possibilities are not excluded.    < from: MR Thoracic Spine w/wo IV Cont (12.15.22 @ 18:50) >  IMPRESSION: No abnormal spinal cord lesions are identified.

## 2022-12-24 NOTE — PROGRESS NOTE ADULT - ASSESSMENT
Assessment: 27 year-old woman with a PMH of asthma presented on 12/14/22 with c/o acute vision loss OD of 2 days duration described as initial blurry vision in the inferior half of the visual field in the right eye, with progression to blurry vision in the entire right eye and darkening of the inferior half of the visual field in the right eye, for which Neurology was consulted. Neuro exam stable with blurred vision on superior quadrants on R eye and R APD. Completed solumedrol 1g IV extended to 7 days (12/14 - 12/20). S/p neurorads LP 12/19. S/p Shiley placement 12/19. PLEX started 12/20, received two days 12/20 & 12/22. Plan for third PLEX on 12/24.     Impression: Improving vision loss OD possibly due to optic neuritis vs optic neuropathy secondary to likely demyelinating / autoimmune condition. Negative for NMO/MOG    Plan  [x] vit D3 2000 IU daily  [] Hep panel, NICOLE virus (pending collection)  [x] MRI brain & orbits w/wo contrast  [x] MRI C and T-Spine w/wo contrast   [x] Solumedrol 1g IV (12/14 - 12/20)  [x]SERUM: A1C, lipid panel, TSH, vitamin D 25-OH, B12, B9, ESR, pending crp  NMO ab neg, MOG neg, ANF 1:320, ANCA indeterminate, ACE neg, Lyme Ab, quantiferon gold neg, RPR  [x] LP   [x] Ophthalmology following, normal retinal exam with no disc edema  -improved from hand motion--> 20/400-->20/200 peripherally with improvement in confrontational visual field (12/22)  [x] s/p IR for shiley placement 12/19  [/] PLEX (day 2/5 complete, started on 12/20).  Day 2 tomorrow, please draw coags, ionized calcium and fibrinogen prior to PLEX on treatment days    #Preventive Measures  - ISS & POCT glucose monitoring  - GI ppx with PPI while on high dose steroids  - Telemonitoring, Neurochecks/VS Q4H  - DVT ppx    Case discussed with Neuro Attending Dr. Maria Assessment: 27 year-old woman with a PMH of asthma presented on 12/14/22 with c/o acute vision loss OD of 2 days duration described as initial blurry vision in the inferior half of the visual field in the right eye, with progression to blurry vision in the entire right eye and darkening of the inferior half of the visual field in the right eye, for which Neurology was consulted. Neuro exam stable with blurred vision on superior quadrants on R eye and R APD. Completed solumedrol 1g IV extended to 7 days (12/14 - 12/20). S/p neurorads LP 12/19. S/p Shiley placement 12/19. PLEX started 12/20, received two days 12/20 & 12/22. Plan for third PLEX on 12/24.     Impression: Improving vision loss OD possibly due to optic neuritis vs optic neuropathy secondary to likely demyelinating / autoimmune condition. Negative for NMO/MOG    Plan  [x] vit D3 2000 IU daily  [] Hep panel, NICOLE virus (pending collection)  [x] MRI brain & orbits w/wo contrast  [x] MRI C and T-Spine w/wo contrast   [x] Solumedrol 1g IV (12/14 - 12/20)  [x]SERUM: A1C, lipid panel, TSH, vitamin D 25-OH, B12, B9, ESR, pending crp  NMO ab neg, MOG neg, ANF 1:320, ANCA indeterminate, ACE neg, Lyme Ab, quantiferon gold neg, RPR  [x] LP   [x] Ophthalmology following,   -improved from hand motion--> 20/400-->20/200 peripherally with improvement in confrontational visual field (12/22)  [x] s/p IR for shiley placement 12/19  [/] PLEX (session 3/5 today: 12/20, 12/22, 12/24): Draw coags, ionized calcium and fibrinogen prior to PLEX on treatment days    #Preventive Measures  - ISS & POCT glucose monitoring  - GI ppx with PPI while on high dose steroids  - Telemonitoring, Neurochecks/VS Q4H  - DVT ppx    Case discussed with Neuro Attending Dr. Maria and seen on rounds. Assessment: 27 year-old woman with a PMH of asthma presented on 12/14/22 with c/o acute vision loss OD of 2 days duration described as initial blurry vision in the inferior half of the visual field in the right eye, with progression to blurry vision in the entire right eye and darkening of the inferior half of the visual field in the right eye, for which Neurology was consulted. Neuro exam stable with blurred vision on superior quadrants on R eye and R APD. Completed solumedrol 1g IV extended to 7 days (12/14 - 12/20). S/p neurorads LP 12/19. S/p Shiley placement 12/19. PLEX started 12/20, received two days 12/20 & 12/22. Plan for third PLEX on 12/24.     Impression: Improving vision loss OD possibly due to optic neuritis vs optic neuropathy secondary to likely demyelinating / autoimmune condition. Negative for NMO/MOG    Plan  [x] vit D3 2000 IU daily  [] Hep panel, NICOLE virus (pending collection)  [x] MRI brain & orbits w/wo contrast  [x] MRI C and T-Spine w/wo contrast   [x] Solumedrol 1g IV (12/14 - 12/20)  [x]SERUM: A1C, lipid panel, TSH, vitamin D 25-OH, B12, B9, ESR, pending crp  NMO ab neg, MOG neg, ANF 1:320, ANCA indeterminate, ACE neg, Lyme Ab, quantiferon gold neg, RPR  [x] LP   [x] Ophthalmology following,   -improved from hand motion--> 20/400-->20/200 peripherally with improvement in confrontational visual field (12/22)  [x] s/p IR for shiley placement 12/19  [/] PLEX (session 3/5 today: 12/20, 12/22, 12/24): Draw coags, ionized calcium and fibrinogen prior to PLEX on treatment days  [ ] obtain thyroid ab (currently on PLEX, will defer in future) and rheum w/u in outpatient setting.    #Preventive Measures  - ISS & POCT glucose monitoring  - GI ppx with PPI while on high dose steroids  - Telemonitoring, Neurochecks/VS Q4H  - DVT ppx    Case discussed with Neuro Attending Dr. Maria and seen on rounds.

## 2022-12-25 LAB
GLUCOSE BLDC GLUCOMTR-MCNC: 100 MG/DL — HIGH (ref 70–99)
GLUCOSE BLDC GLUCOMTR-MCNC: 113 MG/DL — HIGH (ref 70–99)
GLUCOSE BLDC GLUCOMTR-MCNC: 82 MG/DL — SIGNIFICANT CHANGE UP (ref 70–99)
GLUCOSE BLDC GLUCOMTR-MCNC: 86 MG/DL — SIGNIFICANT CHANGE UP (ref 70–99)
OLIGOCLONAL BANDS CSF ELPH-IMP: SIGNIFICANT CHANGE UP

## 2022-12-25 PROCEDURE — 99233 SBSQ HOSP IP/OBS HIGH 50: CPT

## 2022-12-25 RX ADMIN — PANTOPRAZOLE SODIUM 40 MILLIGRAM(S): 20 TABLET, DELAYED RELEASE ORAL at 11:10

## 2022-12-25 RX ADMIN — ENOXAPARIN SODIUM 40 MILLIGRAM(S): 100 INJECTION SUBCUTANEOUS at 21:39

## 2022-12-25 RX ADMIN — Medication 2000 UNIT(S): at 11:10

## 2022-12-25 RX ADMIN — CHLORHEXIDINE GLUCONATE 1 APPLICATION(S): 213 SOLUTION TOPICAL at 11:11

## 2022-12-25 RX ADMIN — Medication 40 MILLIGRAM(S): at 05:32

## 2022-12-25 NOTE — PROGRESS NOTE ADULT - ATTENDING COMMENTS
I have examined the pt at bedside.  The risks and the benefits of the proposed diagnostic/therapeutic approach were thoroughly discussed.   I agree with the above plan and have modified it where necessary.    27 year-old woman with R eye optic neuritis, responsive to treatment (Steroids, PLEX). There remain reduced VA and color perception in R eye, but overall improving. No other symptoms.     Impression: Improving vision loss OD possibly due to optic neuritis vs optic neuropathy secondary to likely demyelinating / autoimmune condition. Negative for NMO/MOG    Plan  [] continue PLEX  [] will follow remaining diagnostic labs.

## 2022-12-25 NOTE — PROGRESS NOTE ADULT - SUBJECTIVE AND OBJECTIVE BOX
Interval History:  Patient seen and examined at the bedside. No acute events overnight.     ROS: Pertinent positives in HPI, all other ROS were reviewed and are negative.      MEDICATIONS  (STANDING):  chlorhexidine 4% Liquid 1 Application(s) Topical <User Schedule>  cholecalciferol 2000 Unit(s) Oral daily  dextrose 5%. 1000 milliLiter(s) (100 mL/Hr) IV Continuous <Continuous>  dextrose 5%. 1000 milliLiter(s) (50 mL/Hr) IV Continuous <Continuous>  dextrose 50% Injectable 25 Gram(s) IV Push once  dextrose 50% Injectable 12.5 Gram(s) IV Push once  dextrose 50% Injectable 25 Gram(s) IV Push once  enoxaparin Injectable 40 milliGRAM(s) SubCutaneous every 24 hours  glucagon  Injectable 1 milliGRAM(s) IntraMuscular once  insulin lispro (ADMELOG) corrective regimen sliding scale   SubCutaneous three times a day before meals  insulin lispro (ADMELOG) corrective regimen sliding scale   SubCutaneous at bedtime  lidocaine 1% (Preservative-free) Injectable 40 milliLiter(s) Local Injection once  pantoprazole    Tablet 40 milliGRAM(s) Oral daily  predniSONE   Tablet 40 milliGRAM(s) Oral daily  sodium chloride 0.9%. 1000 milliLiter(s) (75 mL/Hr) IV Continuous <Continuous>    MEDICATIONS  (PRN):  acetaminophen     Tablet .. 650 milliGRAM(s) Oral every 6 hours PRN Temp greater or equal to 38C (100.4F), Mild Pain (1 - 3)  dextrose Oral Gel 15 Gram(s) Oral once PRN Blood Glucose LESS THAN 70 milliGRAM(s)/deciliter  sodium chloride 0.9% lock flush 10 milliLiter(s) IV Push every 1 hour PRN Pre/post blood products, medications, blood draw, and to maintain line patency    Allergies  No Known Allergies    Intolerances      Vital Signs Last 24 Hrs  T(C): 36.6 (25 Dec 2022 09:50), Max: 36.9 (25 Dec 2022 01:41)  T(F): 97.8 (25 Dec 2022 09:50), Max: 98.4 (25 Dec 2022 01:41)  HR: 84 (25 Dec 2022 09:50) (72 - 90)  BP: 119/80 (25 Dec 2022 09:50) (96/63 - 119/80)  BP(mean): --  RR: 18 (25 Dec 2022 09:50) (18 - 18)  SpO2: 96% (25 Dec 2022 09:50) (96% - 100%)    Parameters below as of 25 Dec 2022 09:50  Patient On (Oxygen Delivery Method): room air      Neurological (>12):  MS: Eyes open. Responds to verbal stimuli. Awake, alert, oriented to person, place, situation, time. Normal affect. Follows all commands.  Language: Speech is clear, fluent, repetition, comprehension intact    CNs: R APD, PERRL on L. 3 mm pupil b/l. Blurred vision throughout in R eye. Difficulty seeing colors. Mildly improve near vision. Able to count fingers in peripheral vision but not centrally. EOMI no nystagmus, no diplopia. V1-3 intact to LT. No facial asymmetry b/l. Hearing intact b/l. Tongue/palate midline. Trap 5/5 b/l  Motor: Normal muscle bulk & tone. No noticeable tremor or seizure. No pronator drift. Biceps strength somewhat limited due to IV on L            Biceps	   R	5	5	 	  L	5	5	    	H-Flex D-Flex	P-Flex  R	5	5	5		   L	5	5	5	  	  Sensation: Intact to LT b/l throughout.   Cortical: Extinction on DSS (neglect): none  Coordination: intact rapid-alt movements. No dysmetria to FTN  Gait and station: baseline ambulation         Labs:   cbc                      11.8   20.06 )-----------( 277      ( 24 Dec 2022 06:58 )             36.8     [id number]    143  |  104  |  9   ----------------------------<  76  4.0   |  26  |  0.63    Ca    8.7      24 Dec 2022 06:56      Coags  Lipids12-15 Chol 238<H> LDL -- HDL 55 Trig 37  A1C  CardiacMarkers    LFTs  UA

## 2022-12-25 NOTE — PROGRESS NOTE ADULT - ASSESSMENT
27 year-old woman with a PMH of asthma presented on 12/14/22 with c/o acute vision loss OD of 2 days duration described as initial blurry vision in the inferior half of the visual field in the right eye, with progression to blurry vision in the entire right eye and darkening of the inferior half of the visual field in the right eye, for which Neurology was consulted. Neuro exam stable with blurred vision on superior quadrants on R eye and R APD. Completed solumedrol 1g IV extended to 7 days (12/14 - 12/20). S/p neurorads LP 12/19. S/p Shiley placement 12/19. PLEX x 5 sessions started 12/20    Impression: Improving vision loss OD possibly due to optic neuritis vs optic neuropathy secondary to likely demyelinating / autoimmune condition. Negative for NMO/MOG    Plan  [x] vit D3 2000 IU daily  [] Hep panel, NICOLE virus (pending collection)  [x] MRI brain & orbits w/wo contrast  [x] MRI C and T-Spine w/wo contrast   [x] Solumedrol 1g IV (12/14 - 12/20)  [x]SERUM: A1C, lipid panel, TSH, vitamin D 25-OH, B12, B9, ESR, pending crp  NMO ab neg, MOG neg, ANF 1:320, ANCA indeterminate, ACE neg, Lyme Ab, quantiferon gold neg, RPR  [x] LP   [x] Ophthalmology following,   -improved from hand motion--> 20/400-->20/200 peripherally with improvement in confrontational visual field (12/22)  [x] s/p IR for shiley placement 12/19  [/] PLEX ( 12/20, 12/22, 12/24): Draw coags, ionized calcium and fibrinogen prior to PLEX on treatment days  [ ] obtain thyroid ab (currently on PLEX, will defer in future) and rheum w/u in outpatient setting.    #Preventive Measures  - ISS & POCT glucose monitoring  - GI ppx with PPI while on high dose steroids  - Telemonitoring, Neurochecks/VS Q4H  - DVT ppx    Case discussed with neurology attending Dr. Maria

## 2022-12-26 LAB
ANION GAP SERPL CALC-SCNC: 14 MMOL/L — SIGNIFICANT CHANGE UP (ref 5–17)
APTT BLD: 27.7 SEC — SIGNIFICANT CHANGE UP (ref 27.5–35.5)
BUN SERPL-MCNC: 14 MG/DL — SIGNIFICANT CHANGE UP (ref 7–23)
CA-I BLD-SCNC: 1.13 MMOL/L — LOW (ref 1.15–1.33)
CALCIUM SERPL-MCNC: 9 MG/DL — SIGNIFICANT CHANGE UP (ref 8.4–10.5)
CHLORIDE SERPL-SCNC: 100 MMOL/L — SIGNIFICANT CHANGE UP (ref 96–108)
CO2 SERPL-SCNC: 25 MMOL/L — SIGNIFICANT CHANGE UP (ref 22–31)
CREAT SERPL-MCNC: 0.73 MG/DL — SIGNIFICANT CHANGE UP (ref 0.5–1.3)
EGFR: 116 ML/MIN/1.73M2 — SIGNIFICANT CHANGE UP
FIBRINOGEN PPP-MCNC: 241 MG/DL — SIGNIFICANT CHANGE UP (ref 200–445)
GLUCOSE BLDC GLUCOMTR-MCNC: 109 MG/DL — HIGH (ref 70–99)
GLUCOSE BLDC GLUCOMTR-MCNC: 111 MG/DL — HIGH (ref 70–99)
GLUCOSE BLDC GLUCOMTR-MCNC: 112 MG/DL — HIGH (ref 70–99)
GLUCOSE BLDC GLUCOMTR-MCNC: 90 MG/DL — SIGNIFICANT CHANGE UP (ref 70–99)
GLUCOSE SERPL-MCNC: 69 MG/DL — LOW (ref 70–99)
HCT VFR BLD CALC: 36.6 % — SIGNIFICANT CHANGE UP (ref 34.5–45)
HGB BLD-MCNC: 11.5 G/DL — SIGNIFICANT CHANGE UP (ref 11.5–15.5)
INR BLD: 1.02 RATIO — SIGNIFICANT CHANGE UP (ref 0.88–1.16)
MCHC RBC-ENTMCNC: 26.3 PG — LOW (ref 27–34)
MCHC RBC-ENTMCNC: 31.4 GM/DL — LOW (ref 32–36)
MCV RBC AUTO: 83.6 FL — SIGNIFICANT CHANGE UP (ref 80–100)
NRBC # BLD: 0 /100 WBCS — SIGNIFICANT CHANGE UP (ref 0–0)
PLATELET # BLD AUTO: 263 K/UL — SIGNIFICANT CHANGE UP (ref 150–400)
POTASSIUM SERPL-MCNC: 4.3 MMOL/L — SIGNIFICANT CHANGE UP (ref 3.5–5.3)
POTASSIUM SERPL-SCNC: 4.3 MMOL/L — SIGNIFICANT CHANGE UP (ref 3.5–5.3)
PROTHROM AB SERPL-ACNC: 11.7 SEC — SIGNIFICANT CHANGE UP (ref 10.5–13.4)
RBC # BLD: 4.38 M/UL — SIGNIFICANT CHANGE UP (ref 3.8–5.2)
RBC # FLD: 16 % — HIGH (ref 10.3–14.5)
SARS-COV-2 RNA SPEC QL NAA+PROBE: SIGNIFICANT CHANGE UP
SODIUM SERPL-SCNC: 139 MMOL/L — SIGNIFICANT CHANGE UP (ref 135–145)
WBC # BLD: 21.95 K/UL — HIGH (ref 3.8–10.5)
WBC # FLD AUTO: 21.95 K/UL — HIGH (ref 3.8–10.5)

## 2022-12-26 PROCEDURE — 99233 SBSQ HOSP IP/OBS HIGH 50: CPT

## 2022-12-26 PROCEDURE — 36514 APHERESIS PLASMA: CPT

## 2022-12-26 RX ADMIN — Medication 2000 UNIT(S): at 11:37

## 2022-12-26 RX ADMIN — CHLORHEXIDINE GLUCONATE 1 APPLICATION(S): 213 SOLUTION TOPICAL at 17:24

## 2022-12-26 RX ADMIN — Medication 40 MILLIGRAM(S): at 05:19

## 2022-12-26 RX ADMIN — ENOXAPARIN SODIUM 40 MILLIGRAM(S): 100 INJECTION SUBCUTANEOUS at 21:09

## 2022-12-26 RX ADMIN — PANTOPRAZOLE SODIUM 40 MILLIGRAM(S): 20 TABLET, DELAYED RELEASE ORAL at 11:37

## 2022-12-26 NOTE — CHART NOTE - NSCHARTNOTEFT_GEN_A_CORE
The patient is a 27 year-old woman with a PMH of asthma brought in by family to the ED on 12/14/22 with c/o acute vision loss OD of 2 days duration described as initial blurry vision in the inferior half of the visual field in the right eye, with progression to blurry vision in the entire right eye and darkening of the inferior half of the visual field in the right eye, for which Neurology was consulted. Neuro exam stable with blurred vision on superior quadrants on R eye and R APD. Completed solumedrol 1g IV extended to 7 days (12/14 - 12/20). Plan for PLEX given no significant improvement. BB contacted to initiate PLEX. A course of 5 PLEX over 10 days planned. Plan of care discussed with the patient and neurology team.     PLEX#1 on 12/20/22, 1PV with 5% albumin as replacement fluid. Pt tolerated the procedure well.    PLEX#2 on 12/22/22. 1PV with 5% albumin as replacement fluid. Pt tolerated the procedure well.     PLEX#3 on 12/24/22. 1PV with 5% albumin as replacement fluid. Pt tolerated the procedure well. Per neurology notes, improving vision loss OD possibly due to optic neuritis vs optic neuropathy secondary to likely demyelinating / autoimmune condition. Negative for NMO/MOG.     PLEX#4 on 12/26/22. 1PV with 5% albumin as replacement fluid.     PLEX#5 scheduled on 12/28/22. Please monitor CBC, fibrinogen, and iCa prior to the procedure. The patient is a 27 year-old woman with a PMH of asthma brought in by family to the ED on 12/14/22 with c/o acute vision loss OD of 2 days duration described as initial blurry vision in the inferior half of the visual field in the right eye, with progression to blurry vision in the entire right eye and darkening of the inferior half of the visual field in the right eye, for which Neurology was consulted. Neuro exam stable with blurred vision on superior quadrants on R eye and R APD. Completed solumedrol 1g IV extended to 7 days (12/14 - 12/20). Plan for PLEX given no significant improvement. BB contacted to initiate PLEX. A course of 5 PLEX over 10 days planned. Plan of care discussed with the patient and neurology team.     PLEX#1 on 12/20/22, 1PV with 5% albumin as replacement fluid. Pt tolerated the procedure well.    PLEX#2 on 12/22/22. 1PV with 5% albumin as replacement fluid. Pt tolerated the procedure well.     PLEX#3 on 12/24/22. 1PV with 5% albumin as replacement fluid. Pt tolerated the procedure well. Per neurology notes, improving vision loss OD possibly due to optic neuritis vs optic neuropathy secondary to likely demyelinating / autoimmune condition. Negative for NMO/MOG.     PLEX#4 on 12/26/22. 1PV with 5% albumin as replacement fluid. Pt tolerated the procedure well.     PLEX#5 scheduled on 12/28/22. Please monitor CBC, fibrinogen, and iCa prior to the procedure.

## 2022-12-26 NOTE — PROGRESS NOTE ADULT - ASSESSMENT
27 year-old woman with a PMH of asthma presented on 12/14/22 with c/o acute vision loss OD of 2 days duration described as initial blurry vision in the inferior half of the visual field in the right eye, with progression to blurry vision in the entire right eye and darkening of the inferior half of the visual field in the right eye, for which Neurology was consulted. Neuro exam stable with blurred vision on superior quadrants on R eye and R APD. Completed solumedrol 1g IV extended to 7 days (12/14 - 12/20). S/p neurorads LP 12/19. S/p Shiley placement 12/19. PLEX x 5 sessions started 12/20    Impression: Improving vision loss OD possibly due to optic neuritis vs optic neuropathy secondary to likely demyelinating / autoimmune condition. Negative for NMO/MOG    Plan  [x] vit D3 2000 IU daily  [] Hep panel, NICOLE virus (pending collection)  [x] MRI brain & orbits w/wo contrast  [x] MRI C and T-Spine w/wo contrast   [x] Solumedrol 1g IV (12/14 - 12/20)  [x]SERUM: A1C, lipid panel, TSH, vitamin D 25-OH, B12, B9, ESR, pending crp  NMO ab neg, MOG neg, ANF 1:320, ANCA indeterminate, ACE neg, Lyme Ab, quantiferon gold neg, RPR  [x] LP   [x] Ophthalmology following,   -improved from hand motion--> 20/400-->20/200 peripherally with improvement in confrontational visual field (12/22)  [x] s/p IR for shiley placement 12/19  [/] PLEX ( 12/20, 12/22, 12/24, 12/26, 12/28): Draw coags, ionized calcium and fibrinogen prior to PLEX on treatment days  [ ] obtain thyroid ab (currently on PLEX, will defer in future) and rheum w/u in outpatient setting.    #Preventive Measures  - ISS & POCT glucose monitoring  - GI ppx with PPI while on high dose steroids  - Telemonitoring, Neurochecks/VS Q4H  - DVT ppx    Case discussed with neurology attending Dr. Maria

## 2022-12-26 NOTE — PROGRESS NOTE ADULT - SUBJECTIVE AND OBJECTIVE BOX
MRN-[id number]  Patient is a 27y old  Female who presents with a chief complaint of Optic neuritis  (16 Dec 2022 13:22)    Subjective: There were no overnight events noted.     Objective:   Home Medications:  Ventolin HFA 90 mcg/inh inhalation aerosol:  (14 Dec 2022 17:33)    MEDICATIONS  (STANDING):  chlorhexidine 4% Liquid 1 Application(s) Topical <User Schedule>  cholecalciferol 2000 Unit(s) Oral daily  dextrose 5%. 1000 milliLiter(s) (100 mL/Hr) IV Continuous <Continuous>  dextrose 5%. 1000 milliLiter(s) (50 mL/Hr) IV Continuous <Continuous>  dextrose 50% Injectable 25 Gram(s) IV Push once  dextrose 50% Injectable 12.5 Gram(s) IV Push once  dextrose 50% Injectable 25 Gram(s) IV Push once  enoxaparin Injectable 40 milliGRAM(s) SubCutaneous every 24 hours  glucagon  Injectable 1 milliGRAM(s) IntraMuscular once  insulin lispro (ADMELOG) corrective regimen sliding scale   SubCutaneous three times a day before meals  insulin lispro (ADMELOG) corrective regimen sliding scale   SubCutaneous at bedtime  lidocaine 1% (Preservative-free) Injectable 40 milliLiter(s) Local Injection once  pantoprazole    Tablet 40 milliGRAM(s) Oral daily  predniSONE   Tablet 40 milliGRAM(s) Oral daily  sodium chloride 0.9%. 1000 milliLiter(s) (75 mL/Hr) IV Continuous <Continuous>    MEDICATIONS  (PRN):  acetaminophen     Tablet .. 650 milliGRAM(s) Oral every 6 hours PRN Temp greater or equal to 38C (100.4F), Mild Pain (1 - 3)  dextrose Oral Gel 15 Gram(s) Oral once PRN Blood Glucose LESS THAN 70 milliGRAM(s)/deciliter  sodium chloride 0.9% lock flush 10 milliLiter(s) IV Push every 1 hour PRN Pre/post blood products, medications, blood draw, and to maintain line patency      Vital Signs Last 24 Hrs  T(C): 36.4 (26 Dec 2022 05:40), Max: 36.9 (25 Dec 2022 21:55)  T(F): 97.6 (26 Dec 2022 05:40), Max: 98.5 (25 Dec 2022 21:55)  HR: 105 (26 Dec 2022 05:40) (81 - 105)  BP: 106/67 (26 Dec 2022 05:40) (96/64 - 119/80)  BP(mean): --  RR: 18 (26 Dec 2022 05:40) (18 - 18)  SpO2: 98% (26 Dec 2022 05:40) (95% - 98%)    Parameters below as of 26 Dec 2022 05:40  Patient On (Oxygen Delivery Method): room air      GENERAL EXAM:  Constitutional: awake and alert. NAD  HEENT: PERRLA, EOMI  Neck: Supple  Respiratory: Breath sounds are clear bilaterally  Cardiovascular: S1 and S2, regular / irregular rhythm  Gastrointestinal: soft, nontender  Extremities: no edema, no cyanosis  Vascular: no carotid bruits  Musculoskeletal: no joint swelling/tenderness, no abnormal movements  Skin: no rashes    NEUROLOGICAL EXAM:  MS:AAOX3 to verbal stimulation Normal affect. Follows all commands.  Language: Speech is clear, fluent, repetition, comprehension intact    CNs: R APD, PERRL on L. 3 mm pupil b/l. Blurred vision throughout in R eye. Difficulty seeing colors. Mildly improve near vision. Able to count fingers in peripheral vision but not centrally. EOMI no nystagmus, no diplopia.   V1-3 intact to LT. No facial asymmetry b/l. Hearing intact b/l. Tongue/palate midline.   Trap 5/5 b/l  Motor:             Biceps	   R	5	5	 	  L	5	5	    	H-Flex D-Flex	P-Flex  R	5	5	5		   L	5	5	5	  Normal muscle bulk & tone.   Sensation: Intact to LT b/l throughout.   Cortical: Extinction on DSS (neglect): none  Coordination:No dysmetria to FTN      Labs:   cbc                      11.5   21.95 )-----------( 263      ( 26 Dec 2022 07:08 )             36.6     [id number]    139  |  100  |  14  ----------------------------<  69<L>  4.3   |  25  |  0.73    Ca    9.0      26 Dec 2022 07:07      CoagsPT/INR - ( 26 Dec 2022 07:08 )   PT: 11.7 sec;   INR: 1.02 ratio         PTT - ( 26 Dec 2022 07:08 )  PTT:27.7 sec  Lipids12-15 Chol 238<H> LDL -- HDL 55 Trig 37    Radiology:  MR head w/wo contrast:   IMPRESSION:  Mild abnormal enhancement and T2 hyperintensity involving the right optic   nerve compatible with optic neuritis.  Multiple foci of white matter T2 hyperintensity which are nonspecific in   appearance. Demyelinating disease is a prime consideration although other   possibilities are not excluded.    MR Cervical/ Thoracic: IMPRESSION: No abnormal spinal cord lesions are identified.

## 2022-12-26 NOTE — PROGRESS NOTE ADULT - ATTENDING COMMENTS
I have examined the pt at bedside.  The risks and the benefits of the proposed diagnostic/therapeutic approach were thoroughly discussed.   I agree with the above plan and have modified it where necessary.    27 year-old woman with R eye optic neuritis, responsive to treatment (Steroids, PLEX). There remain reduced VA and color perception in R eye, but overall improving. No other symptoms. Pt stable today, received 4th PLEX    Impression: Improving vision loss OD possibly due to optic neuritis vs optic neuropathy secondary to likely demyelinating / autoimmune condition. Negative for NMO/MOG    Plan  [] continue PLEX - consider extending PLEX to 6 sessions  [] will follow remaining diagnostic labs.

## 2022-12-26 NOTE — PROGRESS NOTE ADULT - ASSESSMENT
27y female w/ pmhx/ochx of asthma presenting with inferior visual field cut of 1 day duration. Examined by outpatient general ophthalmologist and retina specialist with no abnormal retinal findings. Undergoing workup for optic neuritis OD.     # Optic neuritis right eye  - normal retinal exam with no disc edema - confirmed on outpatient OCT RNFL. Inferior hemifield defect confirmed on outpatient HVF testing.   - MR brain and orbits with enhancement optic nerve and multiple enhancing lesions of white matter - consistent with optic neuritis and demyelinating disease  - atypical workup thus far negative  - NMO and MOG serum Abs negative. CSF ab pending.   - s/p IV steroids with no improvement, s/p 4th dose of PLEX with improvement from HM --20/400-->20/200 peripherally with improvement in confrontational visual field - color vision still limited, APD appears to have reduced mildly  - Continue PLEX per neurology  - Will follow    DW Dr. Harris, neuroophthalmology    Outpatient follow-up: Patient should follow-up with his/her ophthalmologist or with [facility] Department of Ophthalmology at the address below     [address] [place] [address]. [address]  [place] [address] [phone]

## 2022-12-27 LAB
GLUCOSE BLDC GLUCOMTR-MCNC: 110 MG/DL — HIGH (ref 70–99)
GLUCOSE BLDC GLUCOMTR-MCNC: 111 MG/DL — HIGH (ref 70–99)
GLUCOSE BLDC GLUCOMTR-MCNC: 87 MG/DL — SIGNIFICANT CHANGE UP (ref 70–99)
GLUCOSE BLDC GLUCOMTR-MCNC: 95 MG/DL — SIGNIFICANT CHANGE UP (ref 70–99)
MBP CSF-MCNC: 36.4 NG/ML — HIGH (ref 0–2.9)

## 2022-12-27 PROCEDURE — 99232 SBSQ HOSP IP/OBS MODERATE 35: CPT

## 2022-12-27 RX ADMIN — Medication 2000 UNIT(S): at 13:11

## 2022-12-27 RX ADMIN — CHLORHEXIDINE GLUCONATE 1 APPLICATION(S): 213 SOLUTION TOPICAL at 10:00

## 2022-12-27 RX ADMIN — PANTOPRAZOLE SODIUM 40 MILLIGRAM(S): 20 TABLET, DELAYED RELEASE ORAL at 13:11

## 2022-12-27 RX ADMIN — Medication 40 MILLIGRAM(S): at 05:07

## 2022-12-27 NOTE — PROGRESS NOTE ADULT - ATTENDING COMMENTS
Ms. Sapp is a 27 year-old woman with a PMHx of asthma who presented on 12/14/22 with subacute vision loss OD of 2 days duration.  She described it initially as blurry vision in the inferior half of the visual field in the right eye which progressed to blurry vision of the entire right eye with darkening of the inferior half of the visual field in the right eye.  She also noted color desaturation.  This had slowly gotten better "5% per day" since treatment with steroids.  Did have difficult stay in room with pt who coded and noted COVID but pleased since change in room and found herself better with good night sleep.  Completed solumedrol 1g IV extended to 7 days (12/14 - 12/20). S/p neurorads LP 12/19. S/p Shiley placement 12/19. PLEX x 5 sessions started 12/20     Neuro exam with pt awake, alert and oriented * 3 with good mentation.   blurred vision on superior quadrants on R eye and R APD.   No facial droop  NO limitation of movement of eye  no weakness in 4/4   no tremor or dysmmetria      Impression: Improving vision loss OD  secondary to optic neuritis vs optic neuropathy.  r/o MS, r/o clinical isolated syndrome    Negative for NMO/MOG    Plan  [x] vit D3 2000 IU daily  [x]SERUM: A1C, lipid panel, TSH, vitamin D 25-OH, B12, B9, ESR, pending crp  NMO ab neg, MOG neg, ANF 1:320, ANCA indeterminate, ACE neg, Lyme Ab, quantiferon gold neg, RPR, hepatitis (-)  [ ] obtain thyroid ab (currently on PLEX, will defer in future) and rheum w/u in outpatient setting.  - GI ppx with PPI while on high dose steroids  - Telemonitoring, Neurochecks/VS Q4H  - DVT ppx  PLEX (12/20, 12/22, 12/24, 12/26, 12/28): Draw coags, ionized calcium and fibrinogen prior to PLEX on treatment days  steroid taper and will plan for outpatient evaluation for

## 2022-12-27 NOTE — PROGRESS NOTE ADULT - ASSESSMENT
27 year-old woman with a PMH of asthma presented on 12/14/22 with c/o acute vision loss OD of 2 days duration described as initial blurry vision in the inferior half of the visual field in the right eye, with progression to blurry vision in the entire right eye and darkening of the inferior half of the visual field in the right eye, for which Neurology was consulted. Neuro exam stable with blurred vision on superior quadrants on R eye and R APD. Completed solumedrol 1g IV extended to 7 days (12/14 - 12/20). S/p neurorads LP 12/19. S/p Shiley placement 12/19. PLEX x 5 sessions started 12/20    Impression: Improving vision loss OD possibly due to optic neuritis vs optic neuropathy secondary to likely demyelinating / autoimmune condition. Negative for NMO/MOG    Plan  [x] vit D3 2000 IU daily  [\] NICOLE virus (pending result)  [x] MRI brain & orbits w/wo contrast  [x] MRI C and T-Spine w/wo contrast   [x] Solumedrol 1g IV (12/14 - 12/20)  [x]SERUM: A1C, lipid panel, TSH, vitamin D 25-OH, B12, B9, ESR, pending crp  NMO ab neg, MOG neg, ANF 1:320, ANCA indeterminate, ACE neg, Lyme Ab, quantiferon gold neg, RPR, hepatitis (-)  [x] LP   [x] Ophthalmology following,   -improved from hand motion--> 20/400-->20/200 peripherally with improvement in confrontational visual field (12/22)  [x] s/p IR for shiley placement 12/19  [/] PLEX ( 12/20, 12/22, 12/24, 12/26, 12/28): Draw coags, ionized calcium and fibrinogen prior to PLEX on treatment days  [ ] obtain thyroid ab (currently on PLEX, will defer in future) and rheum w/u in outpatient setting.    #Preventive Measures  - ISS & POCT glucose monitoring  - GI ppx with PPI while on high dose steroids  - Telemonitoring, Neurochecks/VS Q4H  - DVT ppx  27 year-old woman with a PMH of asthma presented on 12/14/22 with c/o acute vision loss OD of 2 days duration described as initial blurry vision in the inferior half of the visual field in the right eye, with progression to blurry vision in the entire right eye and darkening of the inferior half of the visual field in the right eye, for which Neurology was consulted. Neuro exam stable with blurred vision on superior quadrants on R eye and R APD. Completed solumedrol 1g IV extended to 7 days (12/14 - 12/20). S/p neurorads LP 12/19. S/p Shiley placement 12/19. PLEX x 5 sessions started 12/20    Impression: Improving vision loss OD possibly due to optic neuritis vs optic neuropathy secondary to likely demyelinating / autoimmune condition. Negative for NMO/MOG    Plan  [x] vit D3 2000 IU daily  [\] NICOLE virus (pending result)  [x] MRI brain & orbits w/wo contrast  [x] MRI C and T-Spine w/wo contrast   [x] Solumedrol 1g IV (12/14 - 12/20)  [x]SERUM: A1C, lipid panel, TSH, vitamin D 25-OH, B12, B9, ESR, pending crp  NMO ab neg, MOG neg, ANF 1:320, ANCA indeterminate, ACE neg, Lyme Ab, quantiferon gold neg, RPR, hepatitis (-)  [x] LP   [x] Ophthalmology following,   -improved from hand motion--> 20/400-->20/200 peripherally with improvement in confrontational visual field (12/22)  [x] s/p IR for shiley placement 12/19  [/] PLEX (12/20, 12/22, 12/24, 12/26, 12/28): Draw coags, ionized calcium and fibrinogen prior to PLEX on treatment days  [ ] obtain thyroid ab (currently on PLEX, will defer in future) and rheum w/u in outpatient setting.    #Preventive Measures  - ISS & POCT glucose monitoring  - GI ppx with PPI while on high dose steroids  - Telemonitoring, Neurochecks/VS Q4H  - DVT ppx

## 2022-12-27 NOTE — PROGRESS NOTE ADULT - SUBJECTIVE AND OBJECTIVE BOX
MRN-[id number]  Patient is a 27y old  Female who presents with a chief complaint of Optic neuritis  (16 Dec 2022 13:22)    Subjective: There were no overnight events noted.     Objective:   Home Medications:  Ventolin HFA 90 mcg/inh inhalation aerosol:  (14 Dec 2022 17:33)    MEDICATIONS  (STANDING):  chlorhexidine 4% Liquid 1 Application(s) Topical <User Schedule>  cholecalciferol 2000 Unit(s) Oral daily  dextrose 5%. 1000 milliLiter(s) (100 mL/Hr) IV Continuous <Continuous>  dextrose 5%. 1000 milliLiter(s) (50 mL/Hr) IV Continuous <Continuous>  dextrose 50% Injectable 25 Gram(s) IV Push once  dextrose 50% Injectable 12.5 Gram(s) IV Push once  dextrose 50% Injectable 25 Gram(s) IV Push once  enoxaparin Injectable 40 milliGRAM(s) SubCutaneous every 24 hours  glucagon  Injectable 1 milliGRAM(s) IntraMuscular once  insulin lispro (ADMELOG) corrective regimen sliding scale   SubCutaneous three times a day before meals  insulin lispro (ADMELOG) corrective regimen sliding scale   SubCutaneous at bedtime  lidocaine 1% (Preservative-free) Injectable 40 milliLiter(s) Local Injection once  pantoprazole    Tablet 40 milliGRAM(s) Oral daily  predniSONE   Tablet 40 milliGRAM(s) Oral daily  sodium chloride 0.9%. 1000 milliLiter(s) (75 mL/Hr) IV Continuous <Continuous>    MEDICATIONS  (PRN):  acetaminophen     Tablet .. 650 milliGRAM(s) Oral every 6 hours PRN Temp greater or equal to 38C (100.4F), Mild Pain (1 - 3)  dextrose Oral Gel 15 Gram(s) Oral once PRN Blood Glucose LESS THAN 70 milliGRAM(s)/deciliter  sodium chloride 0.9% lock flush 10 milliLiter(s) IV Push every 1 hour PRN Pre/post blood products, medications, blood draw, and to maintain line patency      Vital Signs Last 24 Hrs  T(C): 36.4 (26 Dec 2022 05:40), Max: 36.9 (25 Dec 2022 21:55)  T(F): 97.6 (26 Dec 2022 05:40), Max: 98.5 (25 Dec 2022 21:55)  HR: 105 (26 Dec 2022 05:40) (81 - 105)  BP: 106/67 (26 Dec 2022 05:40) (96/64 - 119/80)  BP(mean): --  RR: 18 (26 Dec 2022 05:40) (18 - 18)  SpO2: 98% (26 Dec 2022 05:40) (95% - 98%)    Parameters below as of 26 Dec 2022 05:40  Patient On (Oxygen Delivery Method): room air      GENERAL EXAM:  Constitutional: awake and alert. NAD  HEENT: PERRLA, EOMI  Neck: Supple  Respiratory: Breath sounds are clear bilaterally  Cardiovascular: S1 and S2, regular / irregular rhythm  Gastrointestinal: soft, nontender  Extremities: no edema, no cyanosis  Vascular: no carotid bruits  Musculoskeletal: no joint swelling/tenderness, no abnormal movements  Skin: no rashes    NEUROLOGICAL EXAM:  MS:AAOX3 to verbal stimulation Normal affect. Follows all commands.  Language: Speech is clear, fluent, repetition, comprehension intact    CNs: R APD, PERRL on L. 3 mm pupil b/l. Blurred vision throughout in R eye. Difficulty seeing colors. Mildly improve near vision. Able to count fingers in peripheral vision but not centrally. EOMI no nystagmus, no diplopia.   V1-3 intact to LT. No facial asymmetry b/l. Hearing intact b/l. Tongue/palate midline.   Trap 5/5 b/l  Motor:             Biceps	   R	5	5	 	  L	5	5	    	H-Flex D-Flex	P-Flex  R	5	5	5		   L	5	5	5	  Normal muscle bulk & tone.   Sensation: Intact to LT b/l throughout.   Cortical: Extinction on DSS (neglect): none  Coordination:No dysmetria to FTN      Labs:   cbc                      11.5   21.95 )-----------( 263      ( 26 Dec 2022 07:08 )             36.6     [id number]    139  |  100  |  14  ----------------------------<  69<L>  4.3   |  25  |  0.73    Ca    9.0      26 Dec 2022 07:07      CoagsPT/INR - ( 26 Dec 2022 07:08 )   PT: 11.7 sec;   INR: 1.02 ratio         PTT - ( 26 Dec 2022 07:08 )  PTT:27.7 sec  Lipids12-15 Chol 238<H> LDL -- HDL 55 Trig 37    Radiology:  MR head w/wo contrast:   IMPRESSION:  Mild abnormal enhancement and T2 hyperintensity involving the right optic   nerve compatible with optic neuritis.  Multiple foci of white matter T2 hyperintensity which are nonspecific in   appearance. Demyelinating disease is a prime consideration although other   possibilities are not excluded.    MR Cervical/ Thoracic: IMPRESSION: No abnormal spinal cord lesions are identified.

## 2022-12-28 LAB
ANION GAP SERPL CALC-SCNC: 13 MMOL/L — SIGNIFICANT CHANGE UP (ref 5–17)
BUN SERPL-MCNC: 10 MG/DL — SIGNIFICANT CHANGE UP (ref 7–23)
CA-I BLD-SCNC: 1.13 MMOL/L — LOW (ref 1.15–1.33)
CALCIUM SERPL-MCNC: 9 MG/DL — SIGNIFICANT CHANGE UP (ref 8.4–10.5)
CHLORIDE SERPL-SCNC: 102 MMOL/L — SIGNIFICANT CHANGE UP (ref 96–108)
CO2 SERPL-SCNC: 25 MMOL/L — SIGNIFICANT CHANGE UP (ref 22–31)
CREAT SERPL-MCNC: 0.71 MG/DL — SIGNIFICANT CHANGE UP (ref 0.5–1.3)
EGFR: 119 ML/MIN/1.73M2 — SIGNIFICANT CHANGE UP
FIBRINOGEN PPP-MCNC: 229 MG/DL — SIGNIFICANT CHANGE UP (ref 200–445)
GLUCOSE BLDC GLUCOMTR-MCNC: 104 MG/DL — HIGH (ref 70–99)
GLUCOSE BLDC GLUCOMTR-MCNC: 139 MG/DL — HIGH (ref 70–99)
GLUCOSE BLDC GLUCOMTR-MCNC: 88 MG/DL — SIGNIFICANT CHANGE UP (ref 70–99)
GLUCOSE BLDC GLUCOMTR-MCNC: 96 MG/DL — SIGNIFICANT CHANGE UP (ref 70–99)
GLUCOSE SERPL-MCNC: 88 MG/DL — SIGNIFICANT CHANGE UP (ref 70–99)
HCT VFR BLD CALC: 38 % — SIGNIFICANT CHANGE UP (ref 34.5–45)
HGB BLD-MCNC: 11.8 G/DL — SIGNIFICANT CHANGE UP (ref 11.5–15.5)
MCHC RBC-ENTMCNC: 25.7 PG — LOW (ref 27–34)
MCHC RBC-ENTMCNC: 31.1 GM/DL — LOW (ref 32–36)
MCV RBC AUTO: 82.6 FL — SIGNIFICANT CHANGE UP (ref 80–100)
NRBC # BLD: 0 /100 WBCS — SIGNIFICANT CHANGE UP (ref 0–0)
PLATELET # BLD AUTO: 209 K/UL — SIGNIFICANT CHANGE UP (ref 150–400)
POTASSIUM SERPL-MCNC: 4 MMOL/L — SIGNIFICANT CHANGE UP (ref 3.5–5.3)
POTASSIUM SERPL-SCNC: 4 MMOL/L — SIGNIFICANT CHANGE UP (ref 3.5–5.3)
RBC # BLD: 4.6 M/UL — SIGNIFICANT CHANGE UP (ref 3.8–5.2)
RBC # FLD: 16.8 % — HIGH (ref 10.3–14.5)
SODIUM SERPL-SCNC: 140 MMOL/L — SIGNIFICANT CHANGE UP (ref 135–145)
WBC # BLD: 17.29 K/UL — HIGH (ref 3.8–10.5)
WBC # FLD AUTO: 17.29 K/UL — HIGH (ref 3.8–10.5)

## 2022-12-28 PROCEDURE — 99232 SBSQ HOSP IP/OBS MODERATE 35: CPT

## 2022-12-28 PROCEDURE — 99231 SBSQ HOSP IP/OBS SF/LOW 25: CPT

## 2022-12-28 PROCEDURE — 36514 APHERESIS PLASMA: CPT

## 2022-12-28 RX ORDER — CHOLECALCIFEROL (VITAMIN D3) 125 MCG
2000 CAPSULE ORAL
Qty: 0 | Refills: 0 | DISCHARGE
Start: 2022-12-28

## 2022-12-28 RX ORDER — PANTOPRAZOLE SODIUM 20 MG/1
1 TABLET, DELAYED RELEASE ORAL
Qty: 10 | Refills: 0
Start: 2022-12-28 | End: 2023-01-06

## 2022-12-28 RX ADMIN — CHLORHEXIDINE GLUCONATE 1 APPLICATION(S): 213 SOLUTION TOPICAL at 09:47

## 2022-12-28 RX ADMIN — PANTOPRAZOLE SODIUM 40 MILLIGRAM(S): 20 TABLET, DELAYED RELEASE ORAL at 12:32

## 2022-12-28 RX ADMIN — Medication 2000 UNIT(S): at 12:32

## 2022-12-28 RX ADMIN — Medication 40 MILLIGRAM(S): at 05:14

## 2022-12-28 NOTE — PROGRESS NOTE ADULT - SUBJECTIVE AND OBJECTIVE BOX
[facility] DEPARTMENT OF OPHTHALMOLOGY  ------------------------------------------------------------------------------  Jeramie Gallegos MD PGY-3  Pager: [phone], also available on teams  ------------------------------------------------------------------------------    Following for optic neuritis  Interval History: No acute events overnight. Today patient reports no change in vision.     MEDICATIONS  (STANDING):  chlorhexidine 4% Liquid 1 Application(s) Topical <User Schedule>  cholecalciferol 2000 Unit(s) Oral daily  dextrose 5%. 1000 milliLiter(s) (100 mL/Hr) IV Continuous <Continuous>  dextrose 5%. 1000 milliLiter(s) (50 mL/Hr) IV Continuous <Continuous>  dextrose 50% Injectable 25 Gram(s) IV Push once  dextrose 50% Injectable 12.5 Gram(s) IV Push once  dextrose 50% Injectable 25 Gram(s) IV Push once  glucagon  Injectable 1 milliGRAM(s) IntraMuscular once  insulin lispro (ADMELOG) corrective regimen sliding scale   SubCutaneous three times a day before meals  insulin lispro (ADMELOG) corrective regimen sliding scale   SubCutaneous at bedtime  lidocaine 1% (Preservative-free) Injectable 40 milliLiter(s) Local Injection once  pantoprazole    Tablet 40 milliGRAM(s) Oral daily  sodium chloride 0.9%. 1000 milliLiter(s) (75 mL/Hr) IV Continuous <Continuous>    MEDICATIONS  (PRN):  acetaminophen     Tablet .. 650 milliGRAM(s) Oral every 6 hours PRN Temp greater or equal to 38C (100.4F), Mild Pain (1 - 3)  dextrose Oral Gel 15 Gram(s) Oral once PRN Blood Glucose LESS THAN 70 milliGRAM(s)/deciliter  sodium chloride 0.9% lock flush 10 milliLiter(s) IV Push every 1 hour PRN Pre/post blood products, medications, blood draw, and to maintain line patency      VITALS: T(C): 36.4 (12-28-22 @ 17:31)  T(F): 97.5 (12-28-22 @ 17:31), Max: 98.8 (12-28-22 @ 09:05)  HR: 88 (12-28-22 @ 17:31) (87 - 100)  BP: 109/72 (12-28-22 @ 17:31) (104/61 - 118/78)  RR:  (18 - 19)  SpO2:  (96% - 99%)  Wt(kg): --  General: AAO x 3, appropriate mood and affect    Ophthalmology Exam:  Visual acuity (sc): 20/200 peripherally, CF 4' centrally OD, 20/20 OS  Pupils: pupils reactive to light  ++APD OD  Extraocular movements (EOMs): Full OU, no pain, no diplopia  Confrontational Visual Field (CVF): OD full, possible central resolving scotoma, full OS  Color plates: OD 0/12 OS 12/12    Pen Light Exam (PLE)  External: Flat OU  Lids/Lashes/Lacrimal Ducts: Flat OU    Sclera/Conjunctiva: W+Q OU  Cornea: Cl OU  Anterior Chamber: D+F OU    Iris: Flat OU  Lens: Cl OU      MOG Antibody Reflex to Titer (12.15.22 @ 07:30)    MOG Antibody CBA, Serum: Negative: No informative autoantibodies were detected in this  evaluation. A negative result does not preclude a diagnosis  of an inflammatory CNS demyelinating disorder.  -------------------ADDITIONAL INFORMATION-------------------  This test was developed and its performance characteristics  determined by [facility] in a manner consistent with CLIA  requirements. This test has not been cleared or approved by  the U.S. Food and Drug Administration.  Test Performed by:  [address] [address]  : Jose Spivey M.D. Ph.D.; CLIA# [id number]    Neuromyelitis Optica Antibody (12.15.22 @ 07:30)    Neuromyelitis Optica Antibody: Negative: Recommend repeat testing in 6 months if clinical suspicion  is high. Negative result can occur in the setting of  immunosuppression.  -------------------ADDITIONAL INFORMATION-------------------  This test was developed and its performance characteristics  determined by [facility] in a manner consistent with CLIA  requirements. This test has not been cleared or approved by  the U.S. Food and Drug Administration.  Test Performed by:  [address] [address]  : Jose Spivey M.D. Ph.D.; CLIA# [id number]

## 2022-12-28 NOTE — CHART NOTE - NSCHARTNOTEFT_GEN_A_CORE
26 yo F with a PMH of asthma presented on 12/14/22 with c/o acute vision loss OD of 2 days duration described as initial blurry vision in the inferior half of the visual field in the right eye, with progression to blurry vision in the entire right eye and darkening of the inferior half of the visual field in the right eye, for which Neurology concerned for optic neuritis vs optic neuropathy secondary to demyelinating /autoimmune condition. Negative for NMO/MOG. Mild improvement with IV steroids (12/14 - 12/20). PLEX initiated on 12/20. A course of 5 PLEX over 10 days planned.     Patient is s/p PLEX on 12/20, 12/22, 12/24, and 12/26/22, procedures tolerated well.     Clinical notes, labs, meds reviewed. No acute interval events.     PLEX #5 of 5 performed today, 1 plasma volume with 5% albumin as replacement fluid. Procedure tolerated well.

## 2022-12-28 NOTE — PROGRESS NOTE ADULT - ASSESSMENT
27 year-old woman with a PMH of asthma presented on 12/14/22 with c/o acute vision loss OD of 2 days duration described as initial blurry vision in the inferior half of the visual field in the right eye, with progression to blurry vision in the entire right eye and darkening of the inferior half of the visual field in the right eye, for which Neurology was consulted. Neuro exam stable with blurred vision on superior quadrants on R eye and R APD. Completed solumedrol 1g IV extended to 7 days (12/14 - 12/20). S/p neurorads LP 12/19. S/p Shiley placement 12/19. PLEX x 5 sessions started 12/20. Receiving last day of PLEX today 12/28. Plan for discharge today or tomorrow. Patient will follow up with Dr. Epperson as an outpatient.     Impression: Improving vision loss OD possibly due to optic neuritis vs optic neuropathy secondary to likely demyelinating / autoimmune condition. Negative for NMO/MOG    Plan  [x] vit D3 2000 IU daily  [\] NICOLE virus (pending result)  [x] MRI brain & orbits w/wo contrast  [x] MRI C and T-Spine w/wo contrast   [x] Solumedrol 1g IV (12/14 - 12/20)  [x]SERUM: A1C, lipid panel, TSH, vitamin D 25-OH, B12, B9, ESR, pending crp  NMO ab neg, MOG neg, ANF 1:320, ANCA indeterminate, ACE neg, Lyme Ab, quantiferon gold neg, RPR, hepatitis (-)  [x] LP   [x] Ophthalmology following,   -improved from hand motion--> 20/400-->20/200 peripherally with improvement in confrontational visual field (12/22)  [x] s/p IR for shiley placement 12/19  [/] PLEX (12/20, 12/22, 12/24, 12/26, 12/28): Draw coags, ionized calcium and fibrinogen prior to PLEX on treatment days  [ ] obtain thyroid ab (currently on PLEX, will defer in future) and rheum w/u in outpatient setting.    #Preventive Measures  - ISS & POCT glucose monitoring  - GI ppx with PPI while on high dose steroids  - Telemonitoring, Neurochecks/VS per unit  - DVT ppx       27 year-old woman with a PMH of asthma presented on 12/14/22 with c/o acute vision loss OD of 2 days duration described as initial blurry vision in the inferior half of the visual field in the right eye, with progression to blurry vision in the entire right eye and darkening of the inferior half of the visual field in the right eye, for which Neurology was consulted. Neuro exam stable with blurred vision on superior quadrants on R eye and R APD. Completed solumedrol 1g IV extended to 7 days (12/14 - 12/20). S/p neurorads LP 12/19. S/p Shiley placement 12/19. PLEX x 5 sessions started 12/20. Receiving last day of PLEX today 12/28. Plan for discharge today or tomorrow. Patient will follow up with Dr. Epperson as an outpatient.     Impression: Improving vision loss OD possibly due to optic neuritis vs optic neuropathy secondary to likely demyelinating / autoimmune condition. Negative for NMO/MOG    Plan  [x] vit D3 2000 IU daily  [\] NICOLE virus (pending result)  [x] MRI brain & orbits w/wo contrast  [x] MRI C and T-Spine w/wo contrast   [x] Solumedrol 1g IV (12/14 - 12/20)  [x]SERUM: A1C, lipid panel, TSH, vitamin D 25-OH, B12, B9, ESR, pending crp  NMO ab neg, MOG neg, ANF 1:320, ANCA indeterminate, ACE neg, Lyme Ab, quantiferon gold neg, RPR, hepatitis (-)  [x] LP   Ophthalmology following, normal retinal exam with no disc edema  -improved from hand motion--> 20/400-->20/200 peripherally with improvement in confrontational visual field, APD mildly reduced (12/26)  [x] s/p IR for shiley placement 12/19  [x] PLEX (12/20, 12/22, 12/24, 12/26, 12/28): Draw coags, ionized calcium and fibrinogen prior to PLEX on treatment days  [ ] obtain thyroid ab in future (currently on PLEX) and rheum w/u in outpatient setting.    #Preventive Measures  - ISS & POCT glucose monitoring  - GI ppx with PPI while on high dose steroids  - Telemonitoring, Neurochecks/VS per unit  - DVT ppx

## 2022-12-28 NOTE — PROGRESS NOTE ADULT - SUBJECTIVE AND OBJECTIVE BOX
MRN-[id number]  Patient is a 27y old  Female who presents with a chief complaint of Optic neuritis  (16 Dec 2022 13:22)    Subjective: There were no overnight events noted.     Objective:   Home Medications:  Ventolin HFA 90 mcg/inh inhalation aerosol:  (14 Dec 2022 17:33)    MEDICATIONS  (STANDING):  chlorhexidine 4% Liquid 1 Application(s) Topical <User Schedule>  cholecalciferol 2000 Unit(s) Oral daily  dextrose 5%. 1000 milliLiter(s) (100 mL/Hr) IV Continuous <Continuous>  dextrose 5%. 1000 milliLiter(s) (50 mL/Hr) IV Continuous <Continuous>  dextrose 50% Injectable 25 Gram(s) IV Push once  dextrose 50% Injectable 12.5 Gram(s) IV Push once  dextrose 50% Injectable 25 Gram(s) IV Push once  enoxaparin Injectable 40 milliGRAM(s) SubCutaneous every 24 hours  glucagon  Injectable 1 milliGRAM(s) IntraMuscular once  insulin lispro (ADMELOG) corrective regimen sliding scale   SubCutaneous three times a day before meals  insulin lispro (ADMELOG) corrective regimen sliding scale   SubCutaneous at bedtime  lidocaine 1% (Preservative-free) Injectable 40 milliLiter(s) Local Injection once  pantoprazole    Tablet 40 milliGRAM(s) Oral daily  predniSONE   Tablet 40 milliGRAM(s) Oral daily  sodium chloride 0.9%. 1000 milliLiter(s) (75 mL/Hr) IV Continuous <Continuous>    MEDICATIONS  (PRN):  acetaminophen     Tablet .. 650 milliGRAM(s) Oral every 6 hours PRN Temp greater or equal to 38C (100.4F), Mild Pain (1 - 3)  dextrose Oral Gel 15 Gram(s) Oral once PRN Blood Glucose LESS THAN 70 milliGRAM(s)/deciliter  sodium chloride 0.9% lock flush 10 milliLiter(s) IV Push every 1 hour PRN Pre/post blood products, medications, blood draw, and to maintain line patency      Vital Signs Last 24 Hrs  T(C): 36.4 (26 Dec 2022 05:40), Max: 36.9 (25 Dec 2022 21:55)  T(F): 97.6 (26 Dec 2022 05:40), Max: 98.5 (25 Dec 2022 21:55)  HR: 105 (26 Dec 2022 05:40) (81 - 105)  BP: 106/67 (26 Dec 2022 05:40) (96/64 - 119/80)  BP(mean): --  RR: 18 (26 Dec 2022 05:40) (18 - 18)  SpO2: 98% (26 Dec 2022 05:40) (95% - 98%)    Parameters below as of 26 Dec 2022 05:40  Patient On (Oxygen Delivery Method): room air      GENERAL EXAM:  Constitutional: awake and alert. NAD  HEENT: PERRLA, EOMI  Neck: Supple  Respiratory: Breath sounds are clear bilaterally  Cardiovascular: S1 and S2, regular / irregular rhythm  Gastrointestinal: soft, nontender  Extremities: no edema, no cyanosis  Vascular: no carotid bruits  Musculoskeletal: no joint swelling/tenderness, no abnormal movements  Skin: no rashes    NEUROLOGICAL EXAM:  MS:AAOX3 to verbal stimulation Normal affect. Follows all commands.  Language: Speech is clear, fluent, repetition, comprehension intact    CNs: R APD, PERRL on L. 3 mm pupil b/l. Blurred vision throughout in R eye. Difficulty seeing colors. Mildly improve near vision. Able to count fingers in peripheral vision but not centrally. EOMI no nystagmus, no diplopia.   V1-3 intact to LT. No facial asymmetry b/l. Hearing intact b/l. Tongue/palate midline.   Trap 5/5 b/l  Motor:             Biceps	   R	5	5	 	  L	5	5	    	H-Flex D-Flex	P-Flex  R	5	5	5		   L	5	5	5	  Normal muscle bulk & tone.   Sensation: Intact to LT b/l throughout.   Cortical: Extinction on DSS (neglect): none  Coordination: No dysmetria to FTN    Labs:   cbc                      11.5   21.95 )-----------( 263      ( 26 Dec 2022 07:08 )             36.6     [id number]    139  |  100  |  14  ----------------------------<  69<L>  4.3   |  25  |  0.73    Ca    9.0      26 Dec 2022 07:07    CoagsPT/INR - ( 26 Dec 2022 07:08 )   PT: 11.7 sec;   INR: 1.02 ratio       PTT - ( 26 Dec 2022 07:08 )  PTT:27.7 sec  Lipids12-15 Chol 238<H> LDL -- HDL 55 Trig 3    A1C (5.4)  ldl (176), cholesterol 238  TSH (0.65)   vitamin D 25-OH (36.4)  B12 (722), B9 (8.5)  ESR (54, elevated)  NMO ab (-), MOG (-)   HAIDER (1:320)   c- & p- ANCA (-), ACE (38), Lyme Ab (-), quantiferon gold(-), RPR (-), hepatitis (-)    LP total nucleated cells <1, RBC 2, neutrophil (-), LDH (-)         CSF glu 75(elevated), Protein 28 (wnl)  Oligoclonal bands INCONCLUSIVE (no unquie bands, bands present in CSF & serum)  Cytopathology: NEGATIVE FOR MALIGNANT CELLS.  Lymphocytes and mononuclear cells.  CSF PCR (-) gram stain & culture (-)      Radiology:  MR head w/wo contrast:   Mild abnormal enhancement and T2 hyperintensity involving the right optic   nerve compatible with optic neuritis.  Multiple foci of white matter T2 hyperintensity which are nonspecific in   appearance. Demyelinating disease is a prime consideration although other   possibilities are not excluded.    MR Cervical/ Thoracic: IMPRESSION: No abnormal spinal cord lesions are identified.

## 2022-12-28 NOTE — PROGRESS NOTE ADULT - ATTENDING COMMENTS
Improving vision loss OD possibly due to optic neuritis vs optic neuropathy secondary to likely demyelinating / autoimmune condition. Negative for NMO/MOG.    Finishing PLEX today (5th course), then can be discharged home with follow-up

## 2022-12-28 NOTE — PROGRESS NOTE ADULT - ATTENDING COMMENTS
I have seen and examined the patient and agree with the note above. Discussed with patient at detail to follow up with Dr. Harris upon discharge, neuro-ophthalmologist, within 1 week for further management. D/w patient if symptoms return, patient to immediately contact our office or come to the emergency room. Patient reports understanding. Continue management with neurology as scheduled.

## 2022-12-28 NOTE — PROGRESS NOTE ADULT - ASSESSMENT
27y female w/ pmhx/ochx of asthma presenting with inferior visual field cut of 1 day duration. Examined by outpatient general ophthalmologist and retina specialist with no abnormal retinal findings. Undergoing workup for optic neuritis OD.     # Optic neuritis right eye  - normal retinal exam with no disc edema - confirmed on outpatient OCT RNFL. Inferior hemifield defect confirmed on outpatient HVF testing.   - MR brain and orbits with enhancement optic nerve and multiple enhancing lesions of white matter - consistent with optic neuritis and demyelinating disease  - atypical workup thus far negative  - NMO and MOG serum Abs negative. CSF ab pending.   - s/p IV steroids with no improvement, s/p 5th and final dose of PLEX with improvement from HM --20/400-->20/200 peripherally with improvement in confrontational visual field - color vision still limited.  - Will follow outpatient with Dr. Harris    Seen with Dr. Sepulveda, attending.  DW Dr. Harris, neuroophthalmology    Outpatient follow-up: Patient should follow-up with his/her ophthalmologist or with [facility] Department of Ophthalmology at the address below     [address] [place] [address]. [address]  [place] [address] [phone]

## 2022-12-29 ENCOUNTER — TRANSCRIPTION ENCOUNTER (OUTPATIENT)
Age: 27
End: 2022-12-29

## 2022-12-29 VITALS — DIASTOLIC BLOOD PRESSURE: 70 MMHG | HEART RATE: 89 BPM | SYSTOLIC BLOOD PRESSURE: 107 MMHG

## 2022-12-29 LAB — GLUCOSE BLDC GLUCOMTR-MCNC: 100 MG/DL — HIGH (ref 70–99)

## 2022-12-29 PROCEDURE — 70553 MRI BRAIN STEM W/O & W/DYE: CPT

## 2022-12-29 PROCEDURE — 82330 ASSAY OF CALCIUM: CPT

## 2022-12-29 PROCEDURE — P9041: CPT

## 2022-12-29 PROCEDURE — 86780 TREPONEMA PALLIDUM: CPT

## 2022-12-29 PROCEDURE — 82945 GLUCOSE OTHER FLUID: CPT

## 2022-12-29 PROCEDURE — 85652 RBC SED RATE AUTOMATED: CPT

## 2022-12-29 PROCEDURE — 76937 US GUIDE VASCULAR ACCESS: CPT

## 2022-12-29 PROCEDURE — 86618 LYME DISEASE ANTIBODY: CPT

## 2022-12-29 PROCEDURE — 70498 CT ANGIOGRAPHY NECK: CPT | Mod: MG

## 2022-12-29 PROCEDURE — 82040 ASSAY OF SERUM ALBUMIN: CPT

## 2022-12-29 PROCEDURE — 87799 DETECT AGENT NOS DNA QUANT: CPT

## 2022-12-29 PROCEDURE — 86704 HEP B CORE ANTIBODY TOTAL: CPT

## 2022-12-29 PROCEDURE — 88108 CYTOPATH CONCENTRATE TECH: CPT

## 2022-12-29 PROCEDURE — 86617 LYME DISEASE ANTIBODY: CPT

## 2022-12-29 PROCEDURE — 82042 OTHER SOURCE ALBUMIN QUAN EA: CPT

## 2022-12-29 PROCEDURE — 99238 HOSP IP/OBS DSCHRG MGMT 30/<: CPT

## 2022-12-29 PROCEDURE — 82962 GLUCOSE BLOOD TEST: CPT

## 2022-12-29 PROCEDURE — 84702 CHORIONIC GONADOTROPIN TEST: CPT

## 2022-12-29 PROCEDURE — U0003: CPT

## 2022-12-29 PROCEDURE — 36415 COLL VENOUS BLD VENIPUNCTURE: CPT

## 2022-12-29 PROCEDURE — C1769: CPT

## 2022-12-29 PROCEDURE — 86038 ANTINUCLEAR ANTIBODIES: CPT

## 2022-12-29 PROCEDURE — 85730 THROMBOPLASTIN TIME PARTIAL: CPT

## 2022-12-29 PROCEDURE — 72156 MRI NECK SPINE W/O & W/DYE: CPT

## 2022-12-29 PROCEDURE — 70496 CT ANGIOGRAPHY HEAD: CPT | Mod: MG

## 2022-12-29 PROCEDURE — 86480 TB TEST CELL IMMUN MEASURE: CPT

## 2022-12-29 PROCEDURE — 86706 HEP B SURFACE ANTIBODY: CPT

## 2022-12-29 PROCEDURE — C1894: CPT

## 2022-12-29 PROCEDURE — 86053 AQAPRN-4 ANTB FLO CYTMTRY EA: CPT

## 2022-12-29 PROCEDURE — 82784 ASSAY IGA/IGD/IGG/IGM EACH: CPT

## 2022-12-29 PROCEDURE — 82746 ASSAY OF FOLIC ACID SERUM: CPT

## 2022-12-29 PROCEDURE — 83916 OLIGOCLONAL BANDS: CPT

## 2022-12-29 PROCEDURE — 86711 JOHN CUNNINGHAM ANTIBODY: CPT

## 2022-12-29 PROCEDURE — 83036 HEMOGLOBIN GLYCOSYLATED A1C: CPT

## 2022-12-29 PROCEDURE — 84100 ASSAY OF PHOSPHORUS: CPT

## 2022-12-29 PROCEDURE — C1752: CPT

## 2022-12-29 PROCEDURE — 80061 LIPID PANEL: CPT

## 2022-12-29 PROCEDURE — 85027 COMPLETE CBC AUTOMATED: CPT

## 2022-12-29 PROCEDURE — 80053 COMPREHEN METABOLIC PANEL: CPT

## 2022-12-29 PROCEDURE — 84157 ASSAY OF PROTEIN OTHER: CPT

## 2022-12-29 PROCEDURE — 62328 DX LMBR SPI PNXR W/FLUOR/CT: CPT

## 2022-12-29 PROCEDURE — 83873 ASSAY OF CSF PROTEIN: CPT

## 2022-12-29 PROCEDURE — 87205 SMEAR GRAM STAIN: CPT

## 2022-12-29 PROCEDURE — 83615 LACTATE (LD) (LDH) ENZYME: CPT

## 2022-12-29 PROCEDURE — U0005: CPT

## 2022-12-29 PROCEDURE — 87637 SARSCOV2&INF A&B&RSV AMP PRB: CPT

## 2022-12-29 PROCEDURE — 85384 FIBRINOGEN ACTIVITY: CPT

## 2022-12-29 PROCEDURE — 85610 PROTHROMBIN TIME: CPT

## 2022-12-29 PROCEDURE — 89051 BODY FLUID CELL COUNT: CPT

## 2022-12-29 PROCEDURE — 36556 INSERT NON-TUNNEL CV CATH: CPT

## 2022-12-29 PROCEDURE — 82607 VITAMIN B-12: CPT

## 2022-12-29 PROCEDURE — 84436 ASSAY OF TOTAL THYROXINE: CPT

## 2022-12-29 PROCEDURE — 87483 CNS DNA AMP PROBE TYPE 12-25: CPT

## 2022-12-29 PROCEDURE — 85025 COMPLETE CBC W/AUTO DIFF WBC: CPT

## 2022-12-29 PROCEDURE — P9045: CPT

## 2022-12-29 PROCEDURE — 80074 ACUTE HEPATITIS PANEL: CPT

## 2022-12-29 PROCEDURE — 82164 ANGIOTENSIN I ENZYME TEST: CPT

## 2022-12-29 PROCEDURE — 84443 ASSAY THYROID STIM HORMONE: CPT

## 2022-12-29 PROCEDURE — 70450 CT HEAD/BRAIN W/O DYE: CPT | Mod: MG

## 2022-12-29 PROCEDURE — 87070 CULTURE OTHR SPECIMN AEROBIC: CPT

## 2022-12-29 PROCEDURE — A9585: CPT

## 2022-12-29 PROCEDURE — 36514 APHERESIS PLASMA: CPT

## 2022-12-29 PROCEDURE — 72157 MRI CHEST SPINE W/O & W/DYE: CPT

## 2022-12-29 PROCEDURE — 86362 MOG-IGG1 ANTB CBA EACH: CPT

## 2022-12-29 PROCEDURE — 83735 ASSAY OF MAGNESIUM: CPT

## 2022-12-29 PROCEDURE — P9050: CPT

## 2022-12-29 PROCEDURE — 77001 FLUOROGUIDE FOR VEIN DEVICE: CPT

## 2022-12-29 PROCEDURE — G1004: CPT

## 2022-12-29 PROCEDURE — 99285 EMERGENCY DEPT VISIT HI MDM: CPT

## 2022-12-29 PROCEDURE — 86140 C-REACTIVE PROTEIN: CPT

## 2022-12-29 PROCEDURE — 80048 BASIC METABOLIC PNL TOTAL CA: CPT

## 2022-12-29 PROCEDURE — 70543 MRI ORBT/FAC/NCK W/O &W/DYE: CPT

## 2022-12-29 PROCEDURE — 82306 VITAMIN D 25 HYDROXY: CPT

## 2022-12-29 PROCEDURE — 86036 ANCA SCREEN EACH ANTIBODY: CPT

## 2022-12-29 RX ADMIN — Medication 20 MILLIGRAM(S): at 06:25

## 2022-12-29 NOTE — PROGRESS NOTE ADULT - PROVIDER SPECIALTY LIST ADULT
Neurology
Neurology
Ophthalmology
Neurology
Ophthalmology
Neurology
Ophthalmology
Intervent Radiology

## 2022-12-29 NOTE — PROGRESS NOTE ADULT - ATTENDING COMMENTS
Improving vision loss OD possibly due to optic neuritis, improved after PLEX. Will discharge with followup with neurology and ophthalmology

## 2022-12-29 NOTE — DISCHARGE NOTE NURSING/CASE MANAGEMENT/SOCIAL WORK - NSDCPEFALRISK_GEN_ALL_CORE
For information on Fall & Injury Prevention, visit: [email or web address] OR  [email or web address] OR  https://www.cdc.gov/steadi/patient.html

## 2022-12-29 NOTE — CHART NOTE - NSCHARTNOTEFT_GEN_A_CORE
Neurology    Patient evaluated at bedside. Reports some improvement in vision ~30% since coming in. Received final PLEX yesterday 12/28. Removed shiley today AM at bedside 6:30AM 12/29 using sterile techniques, cleaned region with chlorhexidine, removed sutures. Patient positioned with head declined below feet (trendelenberg) and shiley removed with pressure applied and patient "humming." Held pressure for 12 minutes. No bleeding noted and patient tolerated procedure well. Advised to lie flat for 1 hr. Will be discharged today with close follow up with Dr Epperson. Neuro attending Dr Stratton notified. Neurology    Patient evaluated at bedside. Reports some improvement in vision ~30% since coming in. Received final PLEX yesterday 12/28. Removed shiley today AM at bedside 6:30AM 12/29 using sterile techniques, cleaned region with chlorhexidine, removed sutures. Patient positioned with head declined below feet (trendelenberg) and shiley removed with pressure applied and patient "humming." Held pressure for 12 minutes. No bleeding noted and patient tolerated procedure well. Advised to lie flat for 1 hr. Also advised patient that if she receives 20mg prednisone today PO while inpatient, to take 3 more days and discard the 4th tablet. Will be discharged today with close follow up with Dr Epperson. Neuro attending Dr Stratton notified.

## 2022-12-29 NOTE — PROGRESS NOTE ADULT - SUBJECTIVE AND OBJECTIVE BOX
MRN-[id number]  Patient is a 27y old  Female who presents with a chief complaint of Optic neuritis  (16 Dec 2022 13:22)    Subjective: There were no overnight events noted.     Objective:   Home Medications:  Ventolin HFA 90 mcg/inh inhalation aerosol:  (14 Dec 2022 17:33)    MEDICATIONS  (STANDING):  chlorhexidine 4% Liquid 1 Application(s) Topical <User Schedule>  cholecalciferol 2000 Unit(s) Oral daily  dextrose 5%. 1000 milliLiter(s) (100 mL/Hr) IV Continuous <Continuous>  dextrose 5%. 1000 milliLiter(s) (50 mL/Hr) IV Continuous <Continuous>  dextrose 50% Injectable 25 Gram(s) IV Push once  dextrose 50% Injectable 12.5 Gram(s) IV Push once  dextrose 50% Injectable 25 Gram(s) IV Push once  enoxaparin Injectable 40 milliGRAM(s) SubCutaneous every 24 hours  glucagon  Injectable 1 milliGRAM(s) IntraMuscular once  insulin lispro (ADMELOG) corrective regimen sliding scale   SubCutaneous three times a day before meals  insulin lispro (ADMELOG) corrective regimen sliding scale   SubCutaneous at bedtime  lidocaine 1% (Preservative-free) Injectable 40 milliLiter(s) Local Injection once  pantoprazole    Tablet 40 milliGRAM(s) Oral daily  predniSONE   Tablet 40 milliGRAM(s) Oral daily  sodium chloride 0.9%. 1000 milliLiter(s) (75 mL/Hr) IV Continuous <Continuous>    MEDICATIONS  (PRN):  acetaminophen     Tablet .. 650 milliGRAM(s) Oral every 6 hours PRN Temp greater or equal to 38C (100.4F), Mild Pain (1 - 3)  dextrose Oral Gel 15 Gram(s) Oral once PRN Blood Glucose LESS THAN 70 milliGRAM(s)/deciliter  sodium chloride 0.9% lock flush 10 milliLiter(s) IV Push every 1 hour PRN Pre/post blood products, medications, blood draw, and to maintain line patency      Vital Signs Last 24 Hrs  T(C): 36.4 (26 Dec 2022 05:40), Max: 36.9 (25 Dec 2022 21:55)  T(F): 97.6 (26 Dec 2022 05:40), Max: 98.5 (25 Dec 2022 21:55)  HR: 105 (26 Dec 2022 05:40) (81 - 105)  BP: 106/67 (26 Dec 2022 05:40) (96/64 - 119/80)  BP(mean): --  RR: 18 (26 Dec 2022 05:40) (18 - 18)  SpO2: 98% (26 Dec 2022 05:40) (95% - 98%)    Parameters below as of 26 Dec 2022 05:40  Patient On (Oxygen Delivery Method): room air      GENERAL EXAM:  Constitutional: awake and alert. NAD  HEENT: PERRLA, EOMI  Neck: Supple  Respiratory: Breath sounds are clear bilaterally  Cardiovascular: S1 and S2, regular / irregular rhythm  Gastrointestinal: soft, nontender  Extremities: no edema, no cyanosis  Vascular: no carotid bruits  Musculoskeletal: no joint swelling/tenderness, no abnormal movements  Skin: no rashes    NEUROLOGICAL EXAM:  MS:AAOX3 to verbal stimulation Normal affect. Follows all commands.  Language: Speech is clear, fluent, repetition, comprehension intact    CNs: R APD, PERRL on L. 3 mm pupil b/l. Blurred vision throughout in R eye. Difficulty seeing colors. Mildly improve near vision. Able to count fingers in peripheral vision but not centrally. EOMI no nystagmus, no diplopia.   V1-3 intact to LT. No facial asymmetry b/l. Hearing intact b/l. Tongue/palate midline.   Trap 5/5 b/l  Motor:             Biceps	   R	5	5	 	  L	5	5	    	H-Flex D-Flex	P-Flex  R	5	5	5		   L	5	5	5	  Normal muscle bulk & tone.   Sensation: Intact to LT b/l throughout.   Cortical: Extinction on DSS (neglect): none  Coordination: No dysmetria to FTN    Labs:   cbc                      11.5   21.95 )-----------( 263      ( 26 Dec 2022 07:08 )             36.6     [id number]    139  |  100  |  14  ----------------------------<  69<L>  4.3   |  25  |  0.73    Ca    9.0      26 Dec 2022 07:07    CoagsPT/INR - ( 26 Dec 2022 07:08 )   PT: 11.7 sec;   INR: 1.02 ratio       PTT - ( 26 Dec 2022 07:08 )  PTT:27.7 sec  Lipids12-15 Chol 238<H> LDL -- HDL 55 Trig 3    A1C (5.4)  ldl (176), cholesterol 238  TSH (0.65)   vitamin D 25-OH (36.4)  B12 (722), B9 (8.5)  ESR (54, elevated)  NMO ab (-), MOG (-)   HAIDER (1:320)   c- & p- ANCA (-), ACE (38), Lyme Ab (-), quantiferon gold(-), RPR (-), hepatitis (-)    LP total nucleated cells <1, RBC 2, neutrophil (-), LDH (-)         CSF glu 75(elevated), Protein 28 (wnl)  Oligoclonal bands INCONCLUSIVE (no unquie bands, bands present in CSF & serum)  Cytopathology: NEGATIVE FOR MALIGNANT CELLS.  Lymphocytes and mononuclear cells.  CSF PCR (-) gram stain & culture (-)      Radiology:  MR head w/wo contrast:   Mild abnormal enhancement and T2 hyperintensity involving the right optic   nerve compatible with optic neuritis.  Multiple foci of white matter T2 hyperintensity which are nonspecific in   appearance. Demyelinating disease is a prime consideration although other   possibilities are not excluded.    MR Cervical/ Thoracic: IMPRESSION: No abnormal spinal cord lesions are identified.

## 2022-12-29 NOTE — CHART NOTE - NSCHARTNOTESELECT_GEN_ALL_CORE
Blood Bank
Blood Bank
Neurology
Therapeutic Apheresis Attending Physician Note/Blood Bank
Blood Bank

## 2022-12-29 NOTE — PROGRESS NOTE ADULT - ASSESSMENT
27 year-old woman with a PMH of asthma presented on 12/14/22 with c/o acute vision loss OD of 2 days duration described as initial blurry vision in the inferior half of the visual field in the right eye, with progression to blurry vision in the entire right eye and darkening of the inferior half of the visual field in the right eye, for which Neurology was consulted. Neuro exam stable with blurred vision on superior quadrants on R eye and R APD. Completed solumedrol 1g IV extended to 7 days (12/14 - 12/20). S/p neurorads LP 12/19. S/p Shiley placement 12/19. PLEX x 5 sessions started 12/20. Receiving last day of PLEX 12/28. Shiley removed this morning. Plan for discharge today . Patient will follow up with Dr. Epperson as an outpatient on 1/10/23.     Impression: Improving vision loss OD possibly due to optic neuritis vs optic neuropathy secondary to likely demyelinating / autoimmune condition. Negative for NMO/MOG    Plan  [x] vit D3 2000 IU daily  [\] NICOLE virus (pending result)  [x] MRI brain & orbits w/wo contrast  [x] MRI C and T-Spine w/wo contrast   [x] Solumedrol 1g IV (12/14 - 12/20)  [x]SERUM: A1C, lipid panel, TSH, vitamin D 25-OH, B12, B9, ESR, pending crp  NMO ab neg, MOG neg, ANF 1:320, ANCA indeterminate, ACE neg, Lyme Ab, quantiferon gold neg, RPR, hepatitis (-)  [x] LP   Ophthalmology following, normal retinal exam with no disc edema  -improved from hand motion--> 20/400-->20/200 peripherally with improvement in confrontational visual field, APD mildly reduced (12/26)  [x] s/p IR for shiley placement 12/19  [x] shiley removed 12/29  [x] PLEX (12/20, 12/22, 12/24, 12/26, 12/28): Draw coags, ionized calcium and fibrinogen prior to PLEX on treatment days  [ ] obtain thyroid ab in future (currently on PLEX) and rheum w/u in outpatient setting.    #Preventive Measures  - ISS & POCT glucose monitoring  - GI ppx with PPI while on high dose steroids  - Telemonitoring, Neurochecks/VS per unit  - DVT ppx

## 2022-12-29 NOTE — DISCHARGE NOTE NURSING/CASE MANAGEMENT/SOCIAL WORK - PATIENT PORTAL LINK FT
You can access the FollowMyHealth Patient Portal offered by [facility] by registering at the following website: [email or web address]. By joining [employer]’s FollowMyHealth portal, you will also be able to view your health information using other applications (apps) compatible with our system.

## 2022-12-29 NOTE — PROGRESS NOTE ADULT - TIME BILLING
.Time
chart review, examination, and discussion of plan
Coordination of care and counseling regarding diagnosis, treatment, and further work-up
chart review, and examination and discussion of plan with patient and team
As above
Review of records, tests, medical and neuro exam, discussion of plan.
Review of records, tests, medical and neuro exam, discussion of plan.

## 2022-12-31 LAB
JCV AB SER QL: POSITIVE
REFERENCE LAB TEST RESULTS: 0.49 — HIGH

## 2023-01-03 LAB — MOG AB CSF QL CBA IFA: NEGATIVE — SIGNIFICANT CHANGE UP

## 2023-01-10 ENCOUNTER — APPOINTMENT (OUTPATIENT)
Dept: NEUROLOGY | Facility: CLINIC | Age: 28
End: 2023-01-10
Payer: COMMERCIAL

## 2023-01-10 VITALS
HEIGHT: 63 IN | SYSTOLIC BLOOD PRESSURE: 124 MMHG | WEIGHT: 174 LBS | DIASTOLIC BLOOD PRESSURE: 76 MMHG | BODY MASS INDEX: 30.83 KG/M2 | HEART RATE: 91 BPM

## 2023-01-10 DIAGNOSIS — Z80.3 FAMILY HISTORY OF MALIGNANT NEOPLASM OF BREAST: ICD-10-CM

## 2023-01-10 DIAGNOSIS — Z82.69 FAMILY HISTORY OF OTHER DISEASES OF THE MUSCULOSKELETAL SYSTEM AND CONNECTIVE TISSUE: ICD-10-CM

## 2023-01-10 DIAGNOSIS — Z83.49 FAMILY HISTORY OF OTHER ENDOCRINE, NUTRITIONAL AND METABOLIC DISEASES: ICD-10-CM

## 2023-01-10 DIAGNOSIS — J45.909 UNSPECIFIED ASTHMA, UNCOMPLICATED: ICD-10-CM

## 2023-01-10 PROBLEM — Z00.00 ENCOUNTER FOR PREVENTIVE HEALTH EXAMINATION: Status: ACTIVE | Noted: 2023-01-10

## 2023-01-10 PROCEDURE — 99205 OFFICE O/P NEW HI 60 MIN: CPT

## 2023-01-10 RX ORDER — ALBUTEROL SULFATE 5 MG/ML
SOLUTION, NON-ORAL INHALATION
Refills: 0 | Status: ACTIVE | COMMUNITY

## 2023-01-10 NOTE — PHYSICAL EXAM
[FreeTextEntry1] : PHYSICAL EXAM\par Constitutional: Alert, no acute distress \par Psychiatric: appropriate affect and mood\par Pulmonary: No respiratory distress, stable on room air\par \par NEUROLOGICAL EXAM\par Mental status: The patient is alert, attentive and oriented\par Speech/language: No dysarthria\par Cranial nerves:\par CN II: Visual fields are full to confrontation. + RAPD OD. Color vision impaired OD (red appears black).\par VA 20/20 OS. Central > peripheral vision loss OD- able to make out shapes and outlines. Everything seems black and white. Able to count fingers OD.\par CN III, IV, VI: EOMI, no nystagmus, no ptosis\par CN V: Facial sensation is intact to pinprick in all 3 divisions bilaterally.\par CN VII: Face is symmetric with normal eye closure and smile.\par CN VII: Hearing is normal to rubbing fingers\par CN IX, X: Palate elevates symmetrically. Phonation is normal.\par CN XI: Head turning and shoulder shrug are intact\par CN XII: Tongue is midline with normal movements and no atrophy.\par Motor: Strength is full bilaterally. 5/5 muscle power in bilateral UE and LE.\par Reflexes:                 R        L\par                  Biceps    2+       2+\par                  Patellar   2+       2+\par                  Achilles  2+       2+\par                  Plantar responses- R down\par                                                  L down\par Sensory:                          RUE/RLE         LUE/LLE\par                  light touch       +/+                     +/+\par                  Pinprick           + /+                     +/+\par                  Vibration         + /+                     +/+\par                  Joint PS          +/+                      +/+\par                  Temp              + /+                      +/+\par Coordination: There is no dysmetria on finger-to-nose and heel to shin.\par Gait/Stance: Posture is normal. Gait is steady with normal steps, base, arm swing, and turning. Heel and toe walking are normal. Tandem gait is normal. Romberg is absent.\par \par \par \par \par

## 2023-01-10 NOTE — ASSESSMENT
[FreeTextEntry1] : Assessment/Plan:\par  27 year old female w/ progressive vision loss OD, diagnosed with right optic neuritis and also noted to have lesions on brain MRI characteristic for multiple sclerosis. Given that this is her first clinical event, CSF was negative for "unique" oligoclonal bands, and there is no evidence of dissemination in time on MRI (+ dissemination in space), diagnosis most consistent with clinically isolated syndrome. \par \par She completed 7 days of high dose IVMP and 5 sessions of plasma exchange with prednisone taper. She has reported improvement in her vision - 40% centrally and 65% peripherally, though overall still significantly impaired. I do not see any role of additional steroids at this time. I suspect her vision will continue to improve over the next few weeks, however she is aware that given the severity of her optic neuritis, she may not regain full vision. Although this degree of optic neuritis is rarely seen with typical cases of CIS/MS, it can certainly occur. 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 I would recommend treatment. We discussed options at length. She expressed wishes to conceive soon. I recommended Ocrevus, Vumerity or even Copaxone (given its safety profile in pregnancy). Pt felt most comfortable with Vumerity. Given that the risk of relapse is greatest in the first 1-2 years after diagnosis, I would recommend treatment for at least 1 year prior and confirming stability of disease prior to conceiving. \par \par I discussed the pathophysiology of clinically isolated syndrome, its disease course and management. We discussed the different options for disease modifying therapy. I explained to her that the goal of treatment is to prevent any new clinical relapses, new lesions on MRI scans and to slow down disease progression/disability. In addition to discussing disease modifying therapies, we reviewed available therapies for symptomatic management for spasticity, neuropathic pain, bladder symptoms, fatigue etc. I also stressed on the importance of healthy eating habits, routine physical therapy and vitamin D supplementation.\par \par \par Plan: \par 1. Diagnostic Plan/Imaging: \par Will plan to repeat MRI brain w/w/o contrast 3 months after start of treatment and then 1 year later, if stable. \par Has appointment with Dr. Harris end of this month. Will have OCT and HVF. \par Complete MS mimicker labs, repeat HAIDER\par \par 2. Disease Modifying therapy plan: Will preDMT labs. Signed start form for Vumerity. \par Vumerity:\par CBC and LFT prior to initiation of therapy, then every 6 months.\par Starting dose(capsules):  231 mg BID for 7 days followed by 462 mg  (2 capsules) BID (maintenance dose). \par May take Vumerity with or without food\par Discussed common adverse reactions: Flushing, abdominal pain, diarrhea, and nausea. Patient can take non-enteric coated aspirin 30 minutes prior to dose to minimize flushing.\par Informed patient that Vumerity can cause decrease in lymphocyte counts and cause liver injury. \par Pregnancy category C\par \par \par 3. Symptomatic therapy plan:\par () Vitamin D3 and B12 supplementations \par \par \par Return to clinic 3 months, or sooner if needed\par \par The above plan was discussed with PATRICE HERNANDEZ in great detail.  PATRICE HERNANDEZ verbalized understanding and agrees with plan as detailed above. Patient was provided education and counselling on current diagnosis/symptoms, diagnostic work up, treatment options and potential side effects of any prescribed therapy/therapies. She was advised to call our clinic at [phone] for any new or worsening symptoms, or with any questions or concerns. In case of acute onset of neurological symptoms or worsening presentation, patient was advised to present to nearest emergency room for further evaluation. PATRICE HERNANDEZ expressed understanding and all her questions/concerns were addressed.\par \par Brandee Epperson M.D\par

## 2023-01-10 NOTE — DATA REVIEWED
[de-identified] : MRI brain- 3 non enhancing lesions- R ? juxtacortical, L ovoid corona radiata lesion with ? central vein sign, and R brachium pontis lesion. \par MRI orbits- enhancement of right optic nerve c/w optic neuritis.\par MRI C and T spine 12/15/2022- normal cord signal.

## 2023-01-10 NOTE — HISTORY OF PRESENT ILLNESS
[FreeTextEntry1] : [facility] (initial visit Todd 10, 2023)- PATRICE HERNANDEZ is a 27 year old woman w/ hx of asthma, was admitted to [facility] 12/14/2022-12/28/2022 for 3 day hx of progressive vision loss OD to visual acuity of only hand motion. MRI brain and orbits was obtained and showed enhancement of right optic nerve c/w optic neuritis, and 3 non enhancing lesions on brain MRI. MRI C and T spine were normal. CSF was clear and showed no unique oligoclonal bands (2 OCB present, but equal in CSF and serum). Serum NMO and MOG negative. ACE negative,  HAIDER 1:320. She was treated with a total of 7 days of IV solumedrol with no significant improvement in vision and then started on plasma exchange x 5 sessions with mild symptom improvement. She was discharged on prednisone taper, completed 1/1/2023. \par \par She reports peripheral vision OD is now 65% better and central vision 40% better. No progression or new symptoms.

## 2023-01-11 RX ORDER — DIROXIMEL FUMARATE 231 MG/1
231 CAPSULE ORAL
Qty: 120 | Refills: 0 | Status: COMPLETED | COMMUNITY
Start: 2023-01-10 | End: 2023-01-11

## 2023-01-11 RX ORDER — DIROXIMEL FUMARATE 231 MG/1
231 CAPSULE ORAL
Qty: 120 | Refills: 6 | Status: COMPLETED | COMMUNITY
Start: 2023-01-10 | End: 2023-01-11

## 2023-01-12 LAB
ALBUMIN SERPL ELPH-MCNC: 4.5 G/DL
ALP BLD-CCNC: 83 U/L
ALT SERPL-CCNC: 32 U/L
ANION GAP SERPL CALC-SCNC: 12 MMOL/L
AST SERPL-CCNC: 17 U/L
BASOPHILS # BLD AUTO: 0.05 K/UL
BASOPHILS NFR BLD AUTO: 0.8 %
BILIRUB SERPL-MCNC: 0.4 MG/DL
BUN SERPL-MCNC: 10 MG/DL
CALCIUM SERPL-MCNC: 9.8 MG/DL
CHLORIDE SERPL-SCNC: 105 MMOL/L
CO2 SERPL-SCNC: 25 MMOL/L
CREAT SERPL-MCNC: 0.68 MG/DL
CRP SERPL-MCNC: 10 MG/L
DSDNA AB SER-ACNC: <12 IU/ML
EGFR: 122 ML/MIN/1.73M2
EOSINOPHIL # BLD AUTO: 0.14 K/UL
EOSINOPHIL NFR BLD AUTO: 2.3 %
ERYTHROCYTE [SEDIMENTATION RATE] IN BLOOD BY WESTERGREN METHOD: 51 MM/HR
GLUCOSE SERPL-MCNC: 116 MG/DL
HCT VFR BLD CALC: 38.8 %
HGB BLD-MCNC: 12.2 G/DL
IMM GRANULOCYTES NFR BLD AUTO: 0.5 %
LYMPHOCYTES # BLD AUTO: 2.13 K/UL
LYMPHOCYTES NFR BLD AUTO: 35.3 %
MAN DIFF?: NORMAL
MCHC RBC-ENTMCNC: 26.1 PG
MCHC RBC-ENTMCNC: 31.4 GM/DL
MCV RBC AUTO: 82.9 FL
MONOCYTES # BLD AUTO: 0.49 K/UL
MONOCYTES NFR BLD AUTO: 8.1 %
NEUTROPHILS # BLD AUTO: 3.19 K/UL
NEUTROPHILS NFR BLD AUTO: 53 %
PLATELET # BLD AUTO: 371 K/UL
POTASSIUM SERPL-SCNC: 4 MMOL/L
PROT SERPL-MCNC: 6.6 G/DL
RBC # BLD: 4.68 M/UL
RBC # FLD: 15.9 %
RHEUMATOID FACT SER QL: <10 IU/ML
SODIUM SERPL-SCNC: 142 MMOL/L
WBC # FLD AUTO: 6.03 K/UL

## 2023-01-13 LAB
ANA SER IF-ACNC: NEGATIVE
ENA RNP AB SER IA-ACNC: <0.2 AL
ENA SM AB SER IA-ACNC: <0.2 AL
ENA SS-A AB SER IA-ACNC: <0.2 AL
ENA SS-B AB SER IA-ACNC: <0.2 AL

## 2023-01-27 ENCOUNTER — NON-APPOINTMENT (OUTPATIENT)
Age: 28
End: 2023-01-27

## 2023-01-27 ENCOUNTER — APPOINTMENT (OUTPATIENT)
Dept: OPHTHALMOLOGY | Facility: CLINIC | Age: 28
End: 2023-01-27
Payer: COMMERCIAL

## 2023-01-27 PROCEDURE — 92133 CPTRZD OPH DX IMG PST SGM ON: CPT

## 2023-01-27 PROCEDURE — 99214 OFFICE O/P EST MOD 30 MIN: CPT

## 2023-01-27 PROCEDURE — 92083 EXTENDED VISUAL FIELD XM: CPT

## 2023-03-24 RX ORDER — ALBUTEROL 90 UG/1
0 AEROSOL, METERED ORAL
Qty: 0 | Refills: 0 | DISCHARGE

## 2023-04-01 PROBLEM — J45.909 UNSPECIFIED ASTHMA, UNCOMPLICATED: Chronic | Status: ACTIVE | Noted: 2022-12-14

## 2023-04-23 ENCOUNTER — APPOINTMENT (OUTPATIENT)
Dept: MRI IMAGING | Facility: CLINIC | Age: 28
End: 2023-04-23
Payer: COMMERCIAL

## 2023-04-23 ENCOUNTER — OUTPATIENT (OUTPATIENT)
Dept: OUTPATIENT SERVICES | Facility: HOSPITAL | Age: 28
LOS: 1 days | End: 2023-04-23
Payer: COMMERCIAL

## 2023-04-23 DIAGNOSIS — G37.9 DEMYELINATING DISEASE OF CENTRAL NERVOUS SYSTEM, UNSPECIFIED: ICD-10-CM

## 2023-04-23 PROCEDURE — 76377 3D RENDER W/INTRP POSTPROCES: CPT

## 2023-04-23 PROCEDURE — 76377 3D RENDER W/INTRP POSTPROCES: CPT | Mod: 26

## 2023-04-23 PROCEDURE — 70553 MRI BRAIN STEM W/O & W/DYE: CPT | Mod: 26

## 2023-04-23 PROCEDURE — A9585: CPT

## 2023-04-23 PROCEDURE — 70553 MRI BRAIN STEM W/O & W/DYE: CPT

## 2023-04-28 ENCOUNTER — APPOINTMENT (OUTPATIENT)
Dept: OPHTHALMOLOGY | Facility: CLINIC | Age: 28
End: 2023-04-28
Payer: COMMERCIAL

## 2023-04-28 ENCOUNTER — NON-APPOINTMENT (OUTPATIENT)
Age: 28
End: 2023-04-28

## 2023-04-28 PROCEDURE — 92083 EXTENDED VISUAL FIELD XM: CPT

## 2023-04-28 PROCEDURE — 99204 OFFICE O/P NEW MOD 45 MIN: CPT

## 2023-04-28 PROCEDURE — 92133 CPTRZD OPH DX IMG PST SGM ON: CPT

## 2023-05-06 ENCOUNTER — NON-APPOINTMENT (OUTPATIENT)
Age: 28
End: 2023-05-06

## 2023-05-11 ENCOUNTER — APPOINTMENT (OUTPATIENT)
Dept: NEUROLOGY | Facility: CLINIC | Age: 28
End: 2023-05-11
Payer: COMMERCIAL

## 2023-05-11 VITALS
HEIGHT: 63 IN | WEIGHT: 170 LBS | SYSTOLIC BLOOD PRESSURE: 112 MMHG | HEART RATE: 79 BPM | BODY MASS INDEX: 30.12 KG/M2 | DIASTOLIC BLOOD PRESSURE: 75 MMHG

## 2023-05-11 PROCEDURE — 99214 OFFICE O/P EST MOD 30 MIN: CPT

## 2023-05-11 RX ORDER — DIMETHYL FUMARATE 120-240 MG
KIT ORAL
Qty: 60 | Refills: 0 | Status: DISCONTINUED | COMMUNITY
Start: 2023-01-11 | End: 2023-05-11

## 2023-05-11 RX ORDER — DIMETHYL FUMARATE 120 MG/1
CAPSULE, DELAYED RELEASE ORAL
Qty: 14 | Refills: 0 | Status: DISCONTINUED | COMMUNITY
Start: 2023-01-12 | End: 2023-05-11

## 2023-05-11 NOTE — HISTORY OF PRESENT ILLNESS
[FreeTextEntry1] : INTERIM HX 05/11/2023: Saw Dr. Harris 4/28/23, vision the same as encounter in Jan, 20/50 OD. \par Overall vision has improved, most significant improvement noted early in Jan. at my last visit, 1/10- she was only able to count fingers OD. \par Tolerating fumarate well. flushing after taking the medication lasts about 5 mins. Diarrhea occurred when starting the medication but has since resolved.\par Plans for pregnancy, would like to plan for this year. \par MRI brain w/w/o 4/23/2023- stable\par No new symptoms. \par Leaving for [place]ajj next month.\par \par ------------------------------------\par HPI (initial visit Todd 10, 2023)- PATRICE HERNANDEZ is a 27 year old woman w/ hx of asthma, was admitted to [facility] 12/14/2022-12/28/2022 for 3 day hx of progressive vision loss OD to visual acuity of only hand motion. MRI brain and orbits was obtained and showed enhancement of right optic nerve c/w optic neuritis, and 3 non enhancing lesions on brain MRI. MRI C and T spine were normal. CSF was clear and showed no unique oligoclonal bands (2 OCB present, but equal in CSF and serum). Serum NMO and MOG negative. ACE negative,  HAIDER 1:320. She was treated with a total of 7 days of IV solumedrol with no significant improvement in vision and then started on plasma exchange x 5 sessions with mild symptom improvement. She was discharged on prednisone taper, completed 1/1/2023. \par \par She reports peripheral vision OD is now 65% better and central vision 40% better. No progression or new symptoms. \par \par

## 2023-05-11 NOTE — PHYSICAL EXAM
[FreeTextEntry1] : VA 20/50 OD and 20/20 OS\par Mild RAPD OD\par VFC intact\par \par No facial asymmetry\par Gait is normal

## 2023-05-11 NOTE — ASSESSMENT
[FreeTextEntry1] : Assessment/Plan:\par  27 year old female w/ progressive vision loss OD, diagnosed with right optic neuritis and also noted to have lesions on brain MRI characteristic for multiple sclerosis. Given that this is her first clinical event, CSF was negative for "unique" oligoclonal bands, and there is no evidence of dissemination in time on MRI (+ dissemination in space), diagnosis most consistent with clinically isolated syndrome.  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 \par \par Overall her vision loss OD has improved; from hand motion to now 20/50.\par \par Plan: \par 1. Diagnostic Plan/Imaging: Will plan on repeating brain MRI and C spine MRI in 1/2024 for radiological stability. \par \par 2. Disease Modifying therapy plan: \par Continue Dimethyl fumarate:\par CBC and LFT every 6 months - ordered today\par Continue 240mg BID. May take Tecifdera with or without food\par Discussed common adverse reactions: Flushing, abdominal pain, diarrhea, and nausea. Patient can take non-enteric coated aspirin 30 minutes prior to dose to minimize flushing	\par \par --  As long as follow up MRI's are stable next year, and she remains clinically stable, OK to discontinue drug to try to conceive  -- \par \par 3. Symptomatic therapy plan:\par () Vitamin D3 and B12 supplementations \par \par \par Return to clinic 6 months, or sooner if needed\par \par The above plan was discussed with PATRICE HERNANDEZ in great detail. PATRICE HERNANDEZ verbalized understanding and agrees with plan as detailed above. Patient was provided education and counselling on current diagnosis/symptoms, diagnostic work up, treatment options and potential side effects of any prescribed therapy/therapies. She was advised to call our clinic at [phone] for any new or worsening symptoms, or with any questions or concerns. In case of acute onset of neurological symptoms or worsening presentation, patient was advised to present to nearest emergency room for further evaluation. PATRICE HERNANDEZ expressed understanding and all her questions/concerns were addressed.\par \par Brandee Epperson M.D

## 2023-05-11 NOTE — DATA REVIEWED
[de-identified] : MRI brain w/w/o 4/23/2023- stable\par \par MRI brain- 3 non enhancing lesions- R ? juxtacortical, L ovoid corona radiata lesion with ? central vein sign, and R brachium pontis lesion. \par MRI orbits- enhancement of right optic nerve c/w optic neuritis.\par MRI C and T spine 12/15/2022- normal cord signal.

## 2023-06-01 NOTE — PROGRESS NOTE ADULT - SUBJECTIVE AND OBJECTIVE BOX
[facility] DEPARTMENT OF OPHTHALMOLOGY  ------------------------------------------------------------------------------  Dao Allen MD  PGY2 Ophthalmology  ------------------------------------------------------------------------------    Interval History: No acute events overnight. Patient notes that her vision appears slightly more sharp in the right eye, continues to have to look through periphery, and central vision still blurry, but feels subjectively improved.    MEDICATIONS  (STANDING):  chlorhexidine 4% Liquid 1 Application(s) Topical <User Schedule>  cholecalciferol 2000 Unit(s) Oral daily  dextrose 5%. 1000 milliLiter(s) (100 mL/Hr) IV Continuous <Continuous>  dextrose 5%. 1000 milliLiter(s) (50 mL/Hr) IV Continuous <Continuous>  dextrose 50% Injectable 25 Gram(s) IV Push once  dextrose 50% Injectable 12.5 Gram(s) IV Push once  dextrose 50% Injectable 25 Gram(s) IV Push once  enoxaparin Injectable 40 milliGRAM(s) SubCutaneous every 24 hours  glucagon  Injectable 1 milliGRAM(s) IntraMuscular once  insulin lispro (ADMELOG) corrective regimen sliding scale   SubCutaneous three times a day before meals  insulin lispro (ADMELOG) corrective regimen sliding scale   SubCutaneous at bedtime  lidocaine 1% (Preservative-free) Injectable 40 milliLiter(s) Local Injection once  pantoprazole    Tablet 40 milliGRAM(s) Oral daily  sodium chloride 0.9%. 1000 milliLiter(s) (75 mL/Hr) IV Continuous <Continuous>    MEDICATIONS  (PRN):  acetaminophen     Tablet .. 650 milliGRAM(s) Oral every 6 hours PRN Temp greater or equal to 38C (100.4F), Mild Pain (1 - 3)  dextrose Oral Gel 15 Gram(s) Oral once PRN Blood Glucose LESS THAN 70 milliGRAM(s)/deciliter  sodium chloride 0.9% lock flush 10 milliLiter(s) IV Push every 1 hour PRN Pre/post blood products, medications, blood draw, and to maintain line patency      VITALS: T(C): 36.8 (12-24-22 @ 13:57)  T(F): 98.3 (12-24-22 @ 13:57), Max: 98.3 (12-24-22 @ 13:57)  HR: 83 (12-24-22 @ 13:57) (77 - 88)  BP: 100/67 (12-24-22 @ 13:57) (100/65 - 110/70)  RR:  (18 - 18)  SpO2:  (98% - 100%)  Wt(kg): --  General: AAO x 3, appropriate mood and affect      Ophthalmology Exam:  Visual acuity (sc): 20/400 PH 20/200 peripherally, CF 3' centrally OD, 20/20 OS  Pupils: pupils reactive to light  trace APD OD  Extraocular movements (EOMs): Full OU, no pain, no diplopia  Confrontational Visual Field (CVF): OD full, possible central resolving scotoma, full OS  Color plates: OD 0/12 OS 12/12 - able to see edges of the [place] but still no number    Pen Light Exam (PLE)  External: Flat OU  Lids/Lashes/Lacrimal Ducts: Flat OU    Sclera/Conjunctiva: W+Q OU  Cornea: Cl OU  Anterior Chamber: D+F OU    Iris: Flat OU  Lens: Cl OU      MOG Antibody Reflex to Titer (12.15.22 @ 07:30)    MOG Antibody CBA, Serum: Negative: No informative autoantibodies were detected in this  evaluation. A negative result does not preclude a diagnosis  of an inflammatory CNS demyelinating disorder.  -------------------ADDITIONAL INFORMATION-------------------  This test was developed and its performance characteristics  determined by [facility] in a manner consistent with CLIA  requirements. This test has not been cleared or approved by  the U.S. Food and Drug Administration.  Test Performed by:  [facility] - [address] [address]  : Jose Spivey M.D. Ph.D.; CLIA# [id number]    Neuromyelitis Optica Antibody (12.15.22 @ 07:30)    Neuromyelitis Optica Antibody: Negative: Recommend repeat testing in 6 months if clinical suspicion  is high. Negative result can occur in the setting of  immunosuppression.  -------------------ADDITIONAL INFORMATION-------------------  This test was developed and its performance characteristics  determined by [facility] in a manner consistent with CLIA  requirements. This test has not been cleared or approved by  the U.S. Food and Drug Administration.  Test Performed by:  [place] [address]  : Jose Spivey M.D. Ph.D.; CLIA# [id number]         Topical Steroids Counseling: I discussed with the patient that prolonged use of topical steroids can result in the increased appearance of superficial blood vessels (telangiectasias), lightening (hypopigmentation) and thinning of the skin (atrophy).  Patient understands to avoid using high potency steroids in skin folds, the groin or the face.  The patient verbalized understanding of the proper use and possible adverse effects of topical steroids.  All of the patient's questions and concerns were addressed.

## 2023-06-05 LAB
ALBUMIN SERPL ELPH-MCNC: 4.4 G/DL
ALP BLD-CCNC: 82 U/L
ALT SERPL-CCNC: 28 U/L
ANION GAP SERPL CALC-SCNC: 13 MMOL/L
AST SERPL-CCNC: 18 U/L
BILIRUB SERPL-MCNC: 0.7 MG/DL
BUN SERPL-MCNC: 12 MG/DL
CALCIUM SERPL-MCNC: 9.5 MG/DL
CHLORIDE SERPL-SCNC: 106 MMOL/L
CO2 SERPL-SCNC: 24 MMOL/L
CREAT SERPL-MCNC: 0.67 MG/DL
EGFR: 123 ML/MIN/1.73M2
GLUCOSE SERPL-MCNC: 92 MG/DL
POTASSIUM SERPL-SCNC: 4.3 MMOL/L
PROT SERPL-MCNC: 7.1 G/DL
SODIUM SERPL-SCNC: 143 MMOL/L

## 2023-09-08 RX ORDER — DIMETHYL FUMARATE 240 MG/1
240 CAPSULE, DELAYED RELEASE ORAL
Qty: 60 | Refills: 5 | Status: ACTIVE | COMMUNITY
Start: 2023-01-11 | End: 1900-01-01

## 2023-10-18 ENCOUNTER — APPOINTMENT (OUTPATIENT)
Dept: NEUROLOGY | Facility: CLINIC | Age: 28
End: 2023-10-18
Payer: COMMERCIAL

## 2023-10-18 VITALS
BODY MASS INDEX: 30.82 KG/M2 | HEART RATE: 87 BPM | SYSTOLIC BLOOD PRESSURE: 123 MMHG | DIASTOLIC BLOOD PRESSURE: 75 MMHG | WEIGHT: 174 LBS | OXYGEN SATURATION: 98 %

## 2023-10-18 DIAGNOSIS — G37.9 DEMYELINATING DISEASE OF CENTRAL NERVOUS SYSTEM, UNSPECIFIED: ICD-10-CM

## 2023-10-18 PROCEDURE — 99214 OFFICE O/P EST MOD 30 MIN: CPT

## 2023-10-18 RX ORDER — DIAZEPAM 5 MG/1
5 TABLET ORAL
Qty: 1 | Refills: 0 | Status: DISCONTINUED | COMMUNITY
Start: 2023-03-31 | End: 2023-10-18

## 2023-11-13 ENCOUNTER — APPOINTMENT (OUTPATIENT)
Dept: NEUROLOGY | Facility: CLINIC | Age: 28
End: 2023-11-13

## 2023-12-13 ENCOUNTER — ASOB RESULT (OUTPATIENT)
Age: 28
End: 2023-12-13

## 2023-12-13 ENCOUNTER — APPOINTMENT (OUTPATIENT)
Dept: OBGYN | Facility: CLINIC | Age: 28
End: 2023-12-13
Payer: COMMERCIAL

## 2023-12-13 VITALS
WEIGHT: 180 LBS | DIASTOLIC BLOOD PRESSURE: 80 MMHG | HEIGHT: 63 IN | BODY MASS INDEX: 31.89 KG/M2 | SYSTOLIC BLOOD PRESSURE: 125 MMHG

## 2023-12-13 DIAGNOSIS — Z3A.09 9 WEEKS GESTATION OF PREGNANCY: ICD-10-CM

## 2023-12-13 DIAGNOSIS — Z34.90 ENCOUNTER FOR SUPERVISION OF NORMAL PREGNANCY, UNSPECIFIED, UNSPECIFIED TRIMESTER: ICD-10-CM

## 2023-12-13 DIAGNOSIS — O21.9 VOMITING OF PREGNANCY, UNSPECIFIED: ICD-10-CM

## 2023-12-13 PROCEDURE — 76801 OB US < 14 WKS SINGLE FETUS: CPT

## 2023-12-13 PROCEDURE — 0500F INITIAL PRENATAL CARE VISIT: CPT

## 2023-12-13 PROCEDURE — 36415 COLL VENOUS BLD VENIPUNCTURE: CPT

## 2023-12-14 LAB
ABO + RH PNL BLD: NORMAL
ALBUMIN SERPL ELPH-MCNC: 4.4 G/DL
ALP BLD-CCNC: 103 U/L
ALT SERPL-CCNC: 28 U/L
ANION GAP SERPL CALC-SCNC: 17 MMOL/L
AST SERPL-CCNC: 19 U/L
BASOPHILS # BLD AUTO: 0.04 K/UL
BASOPHILS NFR BLD AUTO: 0.4 %
BILIRUB SERPL-MCNC: 0.4 MG/DL
BLD GP AB SCN SERPL QL: NORMAL
BUN SERPL-MCNC: 8 MG/DL
C TRACH RRNA SPEC QL NAA+PROBE: NOT DETECTED
CALCIUM SERPL-MCNC: 9.4 MG/DL
CHLORIDE SERPL-SCNC: 98 MMOL/L
CO2 SERPL-SCNC: 22 MMOL/L
CREAT SERPL-MCNC: 0.57 MG/DL
EGFR: 127 ML/MIN/1.73M2
EOSINOPHIL # BLD AUTO: 0.09 K/UL
EOSINOPHIL NFR BLD AUTO: 0.8 %
GLUCOSE SERPL-MCNC: 59 MG/DL
HBV SURFACE AG SER QL: NONREACTIVE
HCT VFR BLD CALC: 40.7 %
HCV AB SER QL: NONREACTIVE
HCV S/CO RATIO: 0.06 S/CO
HGB A MFR BLD: 97.6 %
HGB A2 MFR BLD: 2.4 %
HGB BLD-MCNC: 13.3 G/DL
HGB FRACT BLD-IMP: NORMAL
HIV1+2 AB SPEC QL IA.RAPID: NONREACTIVE
IMM GRANULOCYTES NFR BLD AUTO: 0.5 %
LYMPHOCYTES # BLD AUTO: 2.65 K/UL
LYMPHOCYTES NFR BLD AUTO: 24.2 %
MAN DIFF?: NORMAL
MCHC RBC-ENTMCNC: 26.5 PG
MCHC RBC-ENTMCNC: 32.7 GM/DL
MCV RBC AUTO: 81.1 FL
MONOCYTES # BLD AUTO: 0.58 K/UL
MONOCYTES NFR BLD AUTO: 5.3 %
N GONORRHOEA RRNA SPEC QL NAA+PROBE: NOT DETECTED
NEUTROPHILS # BLD AUTO: 7.52 K/UL
NEUTROPHILS NFR BLD AUTO: 68.8 %
PLATELET # BLD AUTO: 320 K/UL
POTASSIUM SERPL-SCNC: 4.3 MMOL/L
PROT SERPL-MCNC: 7.8 G/DL
RBC # BLD: 5.02 M/UL
RBC # FLD: 14.4 %
SODIUM SERPL-SCNC: 137 MMOL/L
SOURCE TP AMPLIFICATION: NORMAL
T PALLIDUM AB SER QL IA: NEGATIVE
T4 FREE SERPL-MCNC: 1.3 NG/DL
TSH SERPL-ACNC: 0.71 UIU/ML
WBC # FLD AUTO: 10.94 K/UL

## 2023-12-19 RX ORDER — DOXYLAMINE SUCCINATE AND PYRIDOXINE HYDROCHLORIDE 20; 20 MG/1; MG/1
20-20 TABLET, EXTENDED RELEASE ORAL
Qty: 30 | Refills: 6 | Status: ACTIVE | COMMUNITY
Start: 2023-12-13 | End: 1900-01-01

## 2023-12-21 ENCOUNTER — APPOINTMENT (OUTPATIENT)
Dept: OBGYN | Facility: CLINIC | Age: 28
End: 2023-12-21
Payer: COMMERCIAL

## 2023-12-21 ENCOUNTER — ASOB RESULT (OUTPATIENT)
Age: 28
End: 2023-12-21

## 2023-12-21 ENCOUNTER — NON-APPOINTMENT (OUTPATIENT)
Age: 28
End: 2023-12-21

## 2023-12-21 VITALS
WEIGHT: 183 LBS | DIASTOLIC BLOOD PRESSURE: 79 MMHG | SYSTOLIC BLOOD PRESSURE: 124 MMHG | BODY MASS INDEX: 32.43 KG/M2 | HEIGHT: 63 IN

## 2023-12-21 DIAGNOSIS — Z3A.10 10 WEEKS GESTATION OF PREGNANCY: ICD-10-CM

## 2023-12-21 PROCEDURE — 0502F SUBSEQUENT PRENATAL CARE: CPT

## 2023-12-21 PROCEDURE — 36415 COLL VENOUS BLD VENIPUNCTURE: CPT

## 2023-12-21 PROCEDURE — 76801 OB US < 14 WKS SINGLE FETUS: CPT

## 2023-12-22 LAB
BACTERIA UR CULT: NORMAL
CYTOLOGY CVX/VAG DOC THIN PREP: NORMAL
LEAD BLD-MCNC: <1 UG/DL
MEV IGG FLD QL IA: >300 AU/ML
MEV IGG+IGM SER-IMP: POSITIVE
RUBV IGG FLD-ACNC: 4 INDEX
RUBV IGG SER-IMP: POSITIVE
VZV AB TITR SER: POSITIVE
VZV IGG SER IF-ACNC: 2854 INDEX

## 2024-01-04 ENCOUNTER — APPOINTMENT (OUTPATIENT)
Dept: OBGYN | Facility: CLINIC | Age: 29
End: 2024-01-04

## 2024-01-04 ENCOUNTER — ASOB RESULT (OUTPATIENT)
Age: 29
End: 2024-01-04

## 2024-01-04 ENCOUNTER — APPOINTMENT (OUTPATIENT)
Dept: OBGYN | Facility: CLINIC | Age: 29
End: 2024-01-04
Payer: COMMERCIAL

## 2024-01-04 VITALS
DIASTOLIC BLOOD PRESSURE: 81 MMHG | WEIGHT: 182 LBS | BODY MASS INDEX: 32.25 KG/M2 | HEIGHT: 63 IN | SYSTOLIC BLOOD PRESSURE: 133 MMHG

## 2024-01-04 DIAGNOSIS — Z3A.12 12 WEEKS GESTATION OF PREGNANCY: ICD-10-CM

## 2024-01-04 PROCEDURE — 0502F SUBSEQUENT PRENATAL CARE: CPT

## 2024-01-04 PROCEDURE — 76813 OB US NUCHAL MEAS 1 GEST: CPT

## 2024-01-05 ENCOUNTER — APPOINTMENT (OUTPATIENT)
Dept: OBGYN | Facility: CLINIC | Age: 29
End: 2024-01-05

## 2024-02-06 ENCOUNTER — APPOINTMENT (OUTPATIENT)
Dept: OBGYN | Facility: CLINIC | Age: 29
End: 2024-02-06
Payer: COMMERCIAL

## 2024-02-06 ENCOUNTER — NON-APPOINTMENT (OUTPATIENT)
Age: 29
End: 2024-02-06

## 2024-02-06 ENCOUNTER — ASOB RESULT (OUTPATIENT)
Age: 29
End: 2024-02-06

## 2024-02-06 VITALS
HEIGHT: 63 IN | SYSTOLIC BLOOD PRESSURE: 117 MMHG | WEIGHT: 186 LBS | BODY MASS INDEX: 32.96 KG/M2 | DIASTOLIC BLOOD PRESSURE: 68 MMHG

## 2024-02-06 DIAGNOSIS — Z3A.16 16 WEEKS GESTATION OF PREGNANCY: ICD-10-CM

## 2024-02-06 DIAGNOSIS — L70.9 ACNE, UNSPECIFIED: ICD-10-CM

## 2024-02-06 PROCEDURE — 36415 COLL VENOUS BLD VENIPUNCTURE: CPT

## 2024-02-06 PROCEDURE — 0502F SUBSEQUENT PRENATAL CARE: CPT

## 2024-02-06 PROCEDURE — 76815 OB US LIMITED FETUS(S): CPT

## 2024-02-06 RX ORDER — CLINDAMYCIN PHOSPHATE 1 G/10ML
1 GEL TOPICAL TWICE DAILY
Qty: 75 | Refills: 0 | Status: ACTIVE | COMMUNITY
Start: 2024-02-06 | End: 1900-01-01

## 2024-02-07 ENCOUNTER — APPOINTMENT (OUTPATIENT)
Dept: OBGYN | Facility: CLINIC | Age: 29
End: 2024-02-07

## 2024-02-07 LAB — GLUCOSE 1H P 50 G GLC PO SERPL-MCNC: 131 MG/DL

## 2024-02-14 LAB
AFP MOM: 1.24
AFP VALUE: 45.3 NG/ML
ALPHA FETOPROTEIN SERUM COMMENT: NORMAL
ALPHA FETOPROTEIN SERUM INTERPRETATION: NORMAL
ALPHA FETOPROTEIN SERUM RESULTS: NORMAL
ALPHA FETOPROTEIN SERUM TEST RESULTS: NORMAL
GESTATIONAL AGE BASED ON: NORMAL
GESTATIONAL AGE ON COLLECTION DATE: 17.6 WEEKS
INSULIN DEP DIABETES: NO
MATERNAL AGE AT EDD AFP: 28.7 YR
MULTIPLE GESTATION: NO
OSBR RISK 1 IN: 5748
RACE: NORMAL
WEIGHT AFP: 186 LBS

## 2024-02-21 ENCOUNTER — TRANSCRIPTION ENCOUNTER (OUTPATIENT)
Age: 29
End: 2024-02-21

## 2024-02-22 ENCOUNTER — APPOINTMENT (OUTPATIENT)
Dept: OBGYN | Facility: CLINIC | Age: 29
End: 2024-02-22
Payer: COMMERCIAL

## 2024-02-22 ENCOUNTER — ASOB RESULT (OUTPATIENT)
Age: 29
End: 2024-02-22

## 2024-02-22 VITALS — BODY MASS INDEX: 33.3 KG/M2 | DIASTOLIC BLOOD PRESSURE: 83 MMHG | WEIGHT: 188 LBS | SYSTOLIC BLOOD PRESSURE: 132 MMHG

## 2024-02-22 DIAGNOSIS — Z3A.19 19 WEEKS GESTATION OF PREGNANCY: ICD-10-CM

## 2024-02-22 PROCEDURE — 0502F SUBSEQUENT PRENATAL CARE: CPT

## 2024-02-22 PROCEDURE — 76805 OB US >/= 14 WKS SNGL FETUS: CPT

## 2024-03-25 ENCOUNTER — APPOINTMENT (OUTPATIENT)
Dept: OBGYN | Facility: CLINIC | Age: 29
End: 2024-03-25
Payer: COMMERCIAL

## 2024-03-25 VITALS
WEIGHT: 190 LBS | HEIGHT: 63 IN | SYSTOLIC BLOOD PRESSURE: 114 MMHG | DIASTOLIC BLOOD PRESSURE: 78 MMHG | BODY MASS INDEX: 33.66 KG/M2

## 2024-03-25 DIAGNOSIS — Z34.92 ENCOUNTER FOR SUPERVISION OF NORMAL PREGNANCY, UNSPECIFIED, SECOND TRIMESTER: ICD-10-CM

## 2024-03-25 DIAGNOSIS — Z3A.24 24 WEEKS GESTATION OF PREGNANCY: ICD-10-CM

## 2024-03-25 PROCEDURE — 0502F SUBSEQUENT PRENATAL CARE: CPT

## 2024-03-28 ENCOUNTER — NON-APPOINTMENT (OUTPATIENT)
Age: 29
End: 2024-03-28

## 2024-04-22 ENCOUNTER — APPOINTMENT (OUTPATIENT)
Dept: OBGYN | Facility: CLINIC | Age: 29
End: 2024-04-22
Payer: COMMERCIAL

## 2024-04-22 ENCOUNTER — NON-APPOINTMENT (OUTPATIENT)
Age: 29
End: 2024-04-22

## 2024-04-22 ENCOUNTER — ASOB RESULT (OUTPATIENT)
Age: 29
End: 2024-04-22

## 2024-04-22 VITALS
SYSTOLIC BLOOD PRESSURE: 108 MMHG | HEIGHT: 63 IN | DIASTOLIC BLOOD PRESSURE: 72 MMHG | BODY MASS INDEX: 34.55 KG/M2 | WEIGHT: 195 LBS

## 2024-04-22 DIAGNOSIS — Z3A.28 28 WEEKS GESTATION OF PREGNANCY: ICD-10-CM

## 2024-04-22 LAB
BASOPHILS # BLD AUTO: 0.03 K/UL
BASOPHILS NFR BLD AUTO: 0.3 %
EOSINOPHIL # BLD AUTO: 0.06 K/UL
EOSINOPHIL NFR BLD AUTO: 0.7 %
HCT VFR BLD CALC: 34.8 %
HGB BLD-MCNC: 10.9 G/DL
HIV1+2 AB SPEC QL IA.RAPID: NONREACTIVE
IMM GRANULOCYTES NFR BLD AUTO: 0.8 %
LYMPHOCYTES # BLD AUTO: 1.67 K/UL
LYMPHOCYTES NFR BLD AUTO: 19.4 %
MAN DIFF?: NORMAL
MCHC RBC-ENTMCNC: 24.8 PG
MCHC RBC-ENTMCNC: 31.3 GM/DL
MCV RBC AUTO: 79.3 FL
MONOCYTES # BLD AUTO: 0.35 K/UL
MONOCYTES NFR BLD AUTO: 4.1 %
NEUTROPHILS # BLD AUTO: 6.45 K/UL
NEUTROPHILS NFR BLD AUTO: 74.7 %
PLATELET # BLD AUTO: 269 K/UL
RBC # BLD: 4.39 M/UL
RBC # FLD: 15.2 %
WBC # FLD AUTO: 8.63 K/UL

## 2024-04-22 PROCEDURE — 0502F SUBSEQUENT PRENATAL CARE: CPT

## 2024-04-22 PROCEDURE — 76816 OB US FOLLOW-UP PER FETUS: CPT

## 2024-04-23 DIAGNOSIS — R73.09 OTHER ABNORMAL GLUCOSE: ICD-10-CM

## 2024-04-23 LAB
BLD GP AB SCN SERPL QL: NORMAL
GLUCOSE 1H P 50 G GLC PO SERPL-MCNC: 144 MG/DL

## 2024-04-29 ENCOUNTER — NON-APPOINTMENT (OUTPATIENT)
Age: 29
End: 2024-04-29

## 2024-05-01 ENCOUNTER — ASOB RESULT (OUTPATIENT)
Age: 29
End: 2024-05-01

## 2024-05-01 ENCOUNTER — APPOINTMENT (OUTPATIENT)
Dept: MATERNAL FETAL MEDICINE | Facility: CLINIC | Age: 29
End: 2024-05-01
Payer: COMMERCIAL

## 2024-05-01 PROCEDURE — G0109 DIAB MANAGE TRN IND/GROUP: CPT | Mod: 95

## 2024-05-01 RX ORDER — URINE ACETONE TEST STRIPS
STRIP MISCELLANEOUS
Qty: 1 | Refills: 0 | Status: ACTIVE | COMMUNITY
Start: 2024-05-01 | End: 1900-01-01

## 2024-05-01 RX ORDER — BLOOD-GLUCOSE METER
W/DEVICE KIT MISCELLANEOUS
Qty: 1 | Refills: 0 | Status: ACTIVE | COMMUNITY
Start: 2024-05-01 | End: 1900-01-01

## 2024-05-01 RX ORDER — LANCETS 33 GAUGE
EACH MISCELLANEOUS
Qty: 4 | Refills: 0 | Status: ACTIVE | COMMUNITY
Start: 2024-05-01 | End: 1900-01-01

## 2024-05-01 RX ORDER — BLOOD-GLUCOSE METER
KIT MISCELLANEOUS 4 TIMES DAILY
Qty: 4 | Refills: 2 | Status: ACTIVE | COMMUNITY
Start: 2024-05-01 | End: 1900-01-01

## 2024-05-05 ENCOUNTER — NON-APPOINTMENT (OUTPATIENT)
Age: 29
End: 2024-05-05

## 2024-05-09 ENCOUNTER — ASOB RESULT (OUTPATIENT)
Age: 29
End: 2024-05-09

## 2024-05-09 ENCOUNTER — APPOINTMENT (OUTPATIENT)
Dept: MATERNAL FETAL MEDICINE | Facility: CLINIC | Age: 29
End: 2024-05-09
Payer: COMMERCIAL

## 2024-05-09 PROCEDURE — G0108 DIAB MANAGE TRN  PER INDIV: CPT | Mod: 95

## 2024-05-22 ENCOUNTER — ASOB RESULT (OUTPATIENT)
Age: 29
End: 2024-05-22

## 2024-05-22 ENCOUNTER — APPOINTMENT (OUTPATIENT)
Dept: OBGYN | Facility: CLINIC | Age: 29
End: 2024-05-22
Payer: COMMERCIAL

## 2024-05-22 VITALS — BODY MASS INDEX: 34.54 KG/M2 | DIASTOLIC BLOOD PRESSURE: 77 MMHG | SYSTOLIC BLOOD PRESSURE: 117 MMHG | WEIGHT: 195 LBS

## 2024-05-22 DIAGNOSIS — R53.83 OTHER FATIGUE: ICD-10-CM

## 2024-05-22 DIAGNOSIS — Z3A.32 32 WEEKS GESTATION OF PREGNANCY: ICD-10-CM

## 2024-05-22 PROCEDURE — 76816 OB US FOLLOW-UP PER FETUS: CPT

## 2024-05-22 PROCEDURE — 0502F SUBSEQUENT PRENATAL CARE: CPT

## 2024-05-22 PROCEDURE — 76819 FETAL BIOPHYS PROFIL W/O NST: CPT | Mod: 59

## 2024-05-23 ENCOUNTER — APPOINTMENT (OUTPATIENT)
Dept: MATERNAL FETAL MEDICINE | Facility: CLINIC | Age: 29
End: 2024-05-23
Payer: COMMERCIAL

## 2024-05-23 ENCOUNTER — ASOB RESULT (OUTPATIENT)
Age: 29
End: 2024-05-23

## 2024-05-23 PROCEDURE — G0108 DIAB MANAGE TRN  PER INDIV: CPT | Mod: 95

## 2024-05-29 LAB — BACTERIA UR CULT: NORMAL

## 2024-06-03 ENCOUNTER — ASOB RESULT (OUTPATIENT)
Age: 29
End: 2024-06-03

## 2024-06-03 ENCOUNTER — APPOINTMENT (OUTPATIENT)
Dept: OBGYN | Facility: CLINIC | Age: 29
End: 2024-06-03

## 2024-06-03 ENCOUNTER — APPOINTMENT (OUTPATIENT)
Dept: OBGYN | Facility: CLINIC | Age: 29
End: 2024-06-03
Payer: COMMERCIAL

## 2024-06-03 ENCOUNTER — NON-APPOINTMENT (OUTPATIENT)
Age: 29
End: 2024-06-03

## 2024-06-03 VITALS — BODY MASS INDEX: 34.54 KG/M2 | SYSTOLIC BLOOD PRESSURE: 106 MMHG | DIASTOLIC BLOOD PRESSURE: 62 MMHG | WEIGHT: 195 LBS

## 2024-06-03 DIAGNOSIS — Z3A.34 34 WEEKS GESTATION OF PREGNANCY: ICD-10-CM

## 2024-06-03 PROCEDURE — 0502F SUBSEQUENT PRENATAL CARE: CPT

## 2024-06-03 PROCEDURE — 76819 FETAL BIOPHYS PROFIL W/O NST: CPT | Mod: 59

## 2024-06-03 PROCEDURE — 76816 OB US FOLLOW-UP PER FETUS: CPT

## 2024-06-13 ENCOUNTER — ASOB RESULT (OUTPATIENT)
Age: 29
End: 2024-06-13

## 2024-06-13 ENCOUNTER — APPOINTMENT (OUTPATIENT)
Dept: MATERNAL FETAL MEDICINE | Facility: CLINIC | Age: 29
End: 2024-06-13
Payer: COMMERCIAL

## 2024-06-13 PROCEDURE — G0108 DIAB MANAGE TRN  PER INDIV: CPT | Mod: 95

## 2024-06-14 ENCOUNTER — APPOINTMENT (OUTPATIENT)
Dept: OBGYN | Facility: CLINIC | Age: 29
End: 2024-06-14
Payer: COMMERCIAL

## 2024-06-14 ENCOUNTER — ASOB RESULT (OUTPATIENT)
Age: 29
End: 2024-06-14

## 2024-06-14 VITALS
WEIGHT: 199 LBS | DIASTOLIC BLOOD PRESSURE: 71 MMHG | HEIGHT: 63 IN | BODY MASS INDEX: 35.26 KG/M2 | SYSTOLIC BLOOD PRESSURE: 101 MMHG

## 2024-06-14 DIAGNOSIS — Z3A.36 36 WEEKS GESTATION OF PREGNANCY: ICD-10-CM

## 2024-06-14 PROCEDURE — 0502F SUBSEQUENT PRENATAL CARE: CPT

## 2024-06-14 PROCEDURE — 76818 FETAL BIOPHYS PROFILE W/NST: CPT

## 2024-06-17 LAB — B-HEM STREP SPEC QL CULT: NORMAL

## 2024-06-20 ENCOUNTER — APPOINTMENT (OUTPATIENT)
Dept: OBGYN | Facility: CLINIC | Age: 29
End: 2024-06-20
Payer: COMMERCIAL

## 2024-06-20 ENCOUNTER — ASOB RESULT (OUTPATIENT)
Age: 29
End: 2024-06-20

## 2024-06-20 VITALS
DIASTOLIC BLOOD PRESSURE: 75 MMHG | BODY MASS INDEX: 35.08 KG/M2 | HEIGHT: 63 IN | WEIGHT: 198 LBS | SYSTOLIC BLOOD PRESSURE: 113 MMHG

## 2024-06-20 DIAGNOSIS — G35 MULTIPLE SCLEROSIS: ICD-10-CM

## 2024-06-20 DIAGNOSIS — O24.419 GESTATIONAL DIABETES MELLITUS IN PREGNANCY, UNSPECIFIED CONTROL: ICD-10-CM

## 2024-06-20 PROCEDURE — 76816 OB US FOLLOW-UP PER FETUS: CPT

## 2024-06-20 PROCEDURE — 0502F SUBSEQUENT PRENATAL CARE: CPT

## 2024-06-20 PROCEDURE — 76818 FETAL BIOPHYS PROFILE W/NST: CPT | Mod: 59

## 2024-06-27 ENCOUNTER — APPOINTMENT (OUTPATIENT)
Dept: OBGYN | Facility: CLINIC | Age: 29
End: 2024-06-27
Payer: COMMERCIAL

## 2024-06-27 ENCOUNTER — APPOINTMENT (OUTPATIENT)
Dept: MATERNAL FETAL MEDICINE | Facility: CLINIC | Age: 29
End: 2024-06-27

## 2024-06-27 ENCOUNTER — ASOB RESULT (OUTPATIENT)
Age: 29
End: 2024-06-27

## 2024-06-27 VITALS
SYSTOLIC BLOOD PRESSURE: 122 MMHG | HEIGHT: 63 IN | BODY MASS INDEX: 35.26 KG/M2 | DIASTOLIC BLOOD PRESSURE: 79 MMHG | WEIGHT: 199 LBS

## 2024-06-27 DIAGNOSIS — Z3A.37 37 WEEKS GESTATION OF PREGNANCY: ICD-10-CM

## 2024-06-27 PROCEDURE — 0502F SUBSEQUENT PRENATAL CARE: CPT

## 2024-06-27 PROCEDURE — 76819 FETAL BIOPHYS PROFIL W/O NST: CPT

## 2024-07-03 ENCOUNTER — NON-APPOINTMENT (OUTPATIENT)
Age: 29
End: 2024-07-03

## 2024-07-03 ENCOUNTER — APPOINTMENT (OUTPATIENT)
Dept: OBGYN | Facility: CLINIC | Age: 29
End: 2024-07-03
Payer: COMMERCIAL

## 2024-07-03 ENCOUNTER — ASOB RESULT (OUTPATIENT)
Age: 29
End: 2024-07-03

## 2024-07-03 VITALS — SYSTOLIC BLOOD PRESSURE: 124 MMHG | BODY MASS INDEX: 35.61 KG/M2 | WEIGHT: 201 LBS | DIASTOLIC BLOOD PRESSURE: 81 MMHG

## 2024-07-03 PROCEDURE — 76816 OB US FOLLOW-UP PER FETUS: CPT

## 2024-07-03 PROCEDURE — 59025 FETAL NON-STRESS TEST: CPT | Mod: 59

## 2024-07-03 PROCEDURE — 0502F SUBSEQUENT PRENATAL CARE: CPT

## 2024-07-05 ENCOUNTER — NON-APPOINTMENT (OUTPATIENT)
Age: 29
End: 2024-07-05

## 2024-07-10 ENCOUNTER — ASOB RESULT (OUTPATIENT)
Age: 29
End: 2024-07-10

## 2024-07-10 ENCOUNTER — APPOINTMENT (OUTPATIENT)
Dept: OBGYN | Facility: CLINIC | Age: 29
End: 2024-07-10
Payer: COMMERCIAL

## 2024-07-10 VITALS — DIASTOLIC BLOOD PRESSURE: 81 MMHG | WEIGHT: 202 LBS | SYSTOLIC BLOOD PRESSURE: 121 MMHG | BODY MASS INDEX: 35.78 KG/M2

## 2024-07-10 DIAGNOSIS — O24.419 GESTATIONAL DIABETES MELLITUS IN PREGNANCY, UNSPECIFIED CONTROL: ICD-10-CM

## 2024-07-10 DIAGNOSIS — Z3A.39 39 WEEKS GESTATION OF PREGNANCY: ICD-10-CM

## 2024-07-10 PROCEDURE — 0502F SUBSEQUENT PRENATAL CARE: CPT

## 2024-07-10 PROCEDURE — 76818 FETAL BIOPHYS PROFILE W/NST: CPT

## 2024-07-11 ENCOUNTER — APPOINTMENT (OUTPATIENT)
Dept: OBGYN | Facility: HOSPITAL | Age: 29
End: 2024-07-11

## 2024-07-11 ENCOUNTER — APPOINTMENT (OUTPATIENT)
Dept: OBGYN | Facility: CLINIC | Age: 29
End: 2024-07-11
Payer: COMMERCIAL

## 2024-07-11 ENCOUNTER — INPATIENT (INPATIENT)
Facility: HOSPITAL | Age: 29
LOS: 2 days | Discharge: HOME | End: 2024-07-14
Attending: OBSTETRICS & GYNECOLOGY | Admitting: OBSTETRICS & GYNECOLOGY
Payer: COMMERCIAL

## 2024-07-11 VITALS — SYSTOLIC BLOOD PRESSURE: 113 MMHG | WEIGHT: 201 LBS | BODY MASS INDEX: 35.61 KG/M2 | DIASTOLIC BLOOD PRESSURE: 74 MMHG

## 2024-07-11 VITALS — OXYGEN SATURATION: 98 % | HEART RATE: 91 BPM

## 2024-07-11 DIAGNOSIS — Z3A.39 39 WEEKS GESTATION OF PREGNANCY: ICD-10-CM

## 2024-07-11 DIAGNOSIS — O24.410 GESTATIONAL DIABETES MELLITUS IN PREGNANCY, DIET CONTROLLED: ICD-10-CM

## 2024-07-11 LAB
ANISOCYTOSIS BLD QL: SIGNIFICANT CHANGE UP
BASOPHILS # BLD AUTO: 0 K/UL — SIGNIFICANT CHANGE UP (ref 0–0.2)
BASOPHILS NFR BLD AUTO: 0 % — SIGNIFICANT CHANGE UP (ref 0–2)
BLD GP AB SCN SERPL QL: NEGATIVE — SIGNIFICANT CHANGE UP
DACRYOCYTES BLD QL SMEAR: SLIGHT — SIGNIFICANT CHANGE UP
ELLIPTOCYTES BLD QL SMEAR: SLIGHT — SIGNIFICANT CHANGE UP
EOSINOPHIL # BLD AUTO: 0.17 K/UL — SIGNIFICANT CHANGE UP (ref 0–0.5)
EOSINOPHIL NFR BLD AUTO: 1.8 % — SIGNIFICANT CHANGE UP (ref 0–6)
GLUCOSE BLDC GLUCOMTR-MCNC: 96 MG/DL — SIGNIFICANT CHANGE UP (ref 70–99)
HCT VFR BLD CALC: 32.7 % — LOW (ref 34.5–45)
HGB BLD-MCNC: 10.4 G/DL — LOW (ref 11.5–15.5)
LYMPHOCYTES # BLD AUTO: 1.96 K/UL — SIGNIFICANT CHANGE UP (ref 1–3.3)
LYMPHOCYTES # BLD AUTO: 20.2 % — SIGNIFICANT CHANGE UP (ref 13–44)
MANUAL SMEAR VERIFICATION: SIGNIFICANT CHANGE UP
MCHC RBC-ENTMCNC: 22.9 PG — LOW (ref 27–34)
MCHC RBC-ENTMCNC: 31.8 GM/DL — LOW (ref 32–36)
MCV RBC AUTO: 71.9 FL — LOW (ref 80–100)
MICROCYTES BLD QL: SIGNIFICANT CHANGE UP
MONOCYTES # BLD AUTO: 0.59 K/UL — SIGNIFICANT CHANGE UP (ref 0–0.9)
MONOCYTES NFR BLD AUTO: 6.1 % — SIGNIFICANT CHANGE UP (ref 2–14)
NEUTROPHILS # BLD AUTO: 6.96 K/UL — SIGNIFICANT CHANGE UP (ref 1.8–7.4)
NEUTROPHILS NFR BLD AUTO: 71.9 % — SIGNIFICANT CHANGE UP (ref 43–77)
OVALOCYTES BLD QL SMEAR: SLIGHT — SIGNIFICANT CHANGE UP
PLAT MORPH BLD: NORMAL — SIGNIFICANT CHANGE UP
PLATELET # BLD AUTO: 292 K/UL — SIGNIFICANT CHANGE UP (ref 150–400)
POIKILOCYTOSIS BLD QL AUTO: SLIGHT — SIGNIFICANT CHANGE UP
POLYCHROMASIA BLD QL SMEAR: SLIGHT — SIGNIFICANT CHANGE UP
RBC # BLD: 4.55 M/UL — SIGNIFICANT CHANGE UP (ref 3.8–5.2)
RBC # FLD: 17.1 % — HIGH (ref 10.3–14.5)
RBC BLD AUTO: ABNORMAL
RH IG SCN BLD-IMP: POSITIVE — SIGNIFICANT CHANGE UP
RH IG SCN BLD-IMP: POSITIVE — SIGNIFICANT CHANGE UP
WBC # BLD: 9.68 K/UL — SIGNIFICANT CHANGE UP (ref 3.8–10.5)
WBC # FLD AUTO: 9.68 K/UL — SIGNIFICANT CHANGE UP (ref 3.8–10.5)

## 2024-07-11 PROCEDURE — 0502F SUBSEQUENT PRENATAL CARE: CPT

## 2024-07-11 PROCEDURE — 59025 FETAL NON-STRESS TEST: CPT | Mod: 59

## 2024-07-11 RX ORDER — TRISODIUM CITRATE DIHYDRATE AND CITRIC ACID MONOHYDRATE 500; 334 MG/5ML; MG/5ML
15 SOLUTION ORAL EVERY 6 HOURS
Refills: 0 | Status: DISCONTINUED | OUTPATIENT
Start: 2024-07-11 | End: 2024-07-12

## 2024-07-11 RX ORDER — DEXTROSE MONOHYDRATE AND SODIUM CHLORIDE 5; .3 G/100ML; G/100ML
1000 INJECTION, SOLUTION INTRAVENOUS
Refills: 0 | Status: DISCONTINUED | OUTPATIENT
Start: 2024-07-11 | End: 2024-07-12

## 2024-07-11 RX ORDER — OXYTOCIN 30 [USP'U]/500ML
333.33 INJECTION, SOLUTION INTRAVENOUS
Qty: 20 | Refills: 0 | Status: DISCONTINUED | OUTPATIENT
Start: 2024-07-11 | End: 2024-07-12

## 2024-07-11 RX ORDER — SODIUM CHLORIDE 0.9 % (FLUSH) 0.9 %
1000 SYRINGE (ML) INJECTION
Refills: 0 | Status: DISCONTINUED | OUTPATIENT
Start: 2024-07-11 | End: 2024-07-12

## 2024-07-11 RX ADMIN — Medication 125 MILLILITER(S): at 20:32

## 2024-07-11 RX ADMIN — DEXTROSE MONOHYDRATE AND SODIUM CHLORIDE 125 MILLILITER(S): 5; .3 INJECTION, SOLUTION INTRAVENOUS at 21:19

## 2024-07-11 NOTE — OB PROVIDER H&P - LABOR: CERVICAL EFFACEMENT
[place] [facility]      Mita Higgins   31 year old female who is being evaluated via a video visit   : 1990  Referring provider: Dr. 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 Barrett,     It was a pleasure to see Ms. Higgins in Skull Base Neurosurgery clinic today regarding her extensive meningioma history in followup. As you know, Ms. Higgins is a 30 year old left-handed female who was diagnosed with a large bilateral petroclival meningioma after presenting with headaches. She initially underwent a right pterional craniotomy with Dr. Smith in  for biopsy, which revealed WHO grade 1 meningioma. She then was found with obstructive hydrocephalus and underwent a left frontal  shunt placement (Strata 1.5) with Dr. Jaffe in . Then later in , she underwent a right paramedian supracerebellar, infratentorial approach by Dr. Edwards and Dr. Anderson for debulking. The fourth nerve was cut during this operation in . She then recently underwent a redo torcular craniotomy for resection with me on 9/10/2021. She returns for scheduled followup.    Here she relays to me that she has been feeling well since surgery without any headaches, balance issues, or new visual field defects.      Review of Systems:   Pertinent items are noted in HPI, remainder of complete ROS is negative.      Physical Exam by Video:   Patient well appearing.   Fluent speech.   Stable dysconjugate gaze.  Face symmetric.  Surgical area appears well.     Imaging:  We reviewed her recent MRI  and compared it to the prior MRI from last year. This demonstrates good debulking of right perimesencephalic meningioma component, the target of her recent surgery.    Assessment:  1. bilateral petroclival meningioma s/p two surgeries (Jerry Anderson , )   2.  "Obstructive hydrocephalus s/p left frontal  shunt (Strata 1.5; Dr. Jaffe; 4/2012)  3. Fourth nerve palsy  4. Right frontal convexity meningioma, resected 9/10/21  5. Left sphenoid wing meningioma, resected 9/10/21  6. Bilateral hip osteonecrosis s/p ADDY  7. Headaches, followed by Neurology Headache Clinic    Plan:   1. We are pleased to see the results of her recent MRI. We will plan to see her back in 1 year with updated MRI.  2. I encouraged her to contact us should any questions or concerns arise in advance of her next appointment      It has been a pleasure to participate in the care of your patient. Please feel free to contact us if we may be of any assistance for Ms. Higgins.    Visit:  Video started at 1226  Video ended at 1233    Kris Pinzon MD   Department of Neurosurgery  [facility]  [facility]         Scribe Disclosure:  I, Spencer Bernabe, am serving as a scribe to document services personally performed by Kris Pinzon MD at this visit, based upon the provider's statements to me. All documentation has been reviewed by the aforementioned provider prior to being entered into the official medical record.          Patient statement: cannot visit in person due to Covid19    Physician has received verbal consent for a Video Visit from the patient? Yes    Patient would like the video invitation sent by: [facility] Well    Originating Location (pt. Location): Home    Distant Location (provider location):  [facility] [place]     Mode of Communication:  Video Conference via Gavin STONE Ronaldo is a 31 year old female who is being evaluated via a billable video visit.      The patient has been notified of following:     \"This video visit will be conducted via a call between you and your physician/provider. We have found that certain health care needs can be provided without the need for an in-person physical exam.  This " "service lets us provide the care you need with a video conversation.  If a prescription is necessary we can send it directly to your pharmacy.  If lab work is needed we can place an order for that and you can then stop by our lab to have the test done at a later time.    If during the course of the call the physician/provider feels a video visit is not appropriate, you will not be charged for this service.\"      " 50-74%
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
## 2024-07-11 NOTE — OB PROVIDER H&P - NSLOWPPHRISK_OBGYN_A_OB
No previous uterine incision/Fuentes Pregnancy/Less than or equal to 4 previous vaginal births/No known bleeding disorder

## 2024-07-11 NOTE — OB PROVIDER H&P - HISTORY OF PRESENT ILLNESS
PA Admission H&P    Subjective  HPI: 28F  39.6wks gestational age presents for a scheduled IOL for GDMA1. Pt notes experiencing contractions every 5 min beginning at 3pm this afternoon, now 5/10 pain. Denies desire for an epidural at this time. +FM. -LOF. -VB. Pt denies any other concerns.    – PNC: GDMA1. GBS negative. EFW 3400g extrapolated from sono in office 1 week ago.  – OBHx: Primigravida. Denies miscarriages, terminations, ectopic pregnancies  – GynHx: Denies fibroids, ovarian cysts, abnormal pap smears, STIs  – PMH: Multiple sclerosis (follows with neurologist, Dr. Epperson, not currently on medications, doing well and cleared). Seasonal asthma (denies intubations, exacerbations, hospitalizations, does not carry an inhaler).  – PSH: Denies  – Psych: Denies anxiety, depression  – Social: Denies T/E/D  – Meds: PNV  – Allergies: NKDA  – Will accept blood transfusions? Yes

## 2024-07-11 NOTE — OB PROVIDER H&P - PROBLEM SELECTOR PLAN 1
Plan  1. Admit to L&D. Routine Labs. IVF.  2. IOL with PO cytotec  3. Fetus: cat 1 tracing. VTX. EFW 3400g extrapolated from sono in office 1 week ago. Continuous EFM. Sono. No concerns.  4. Prenatal issues: GDMA1 (FS and IV fluids per OB protocol)  5. GBS negative  6. Pain: IV pain meds/epidural PRN    Discussed with Dr. Ayan Burgos PA-C

## 2024-07-11 NOTE — OB RN PATIENT PROFILE - FALL HARM RISK - UNIVERSAL INTERVENTIONS
Bed in lowest position, wheels locked, appropriate side rails in place/Call bell, personal items and telephone in reach/Instruct patient to call for assistance before getting out of bed or chair/Non-slip footwear when patient is out of bed/[place] to call system/Physically safe environment - no spills, clutter or unnecessary equipment/Purposeful Proactive Rounding/Room/bathroom lighting operational, light cord in reach

## 2024-07-11 NOTE — OB PROVIDER H&P - NSHPREVIEWOFSYSTEMS_GEN_ALL_CORE
Pt notes experiencing contractions every 5 min beginning at 3pm this afternoon, now 5/10 pain. Denies desire for an epidural at this time. +FM. -LOF. -VB. Pt denies any other concerns.

## 2024-07-11 NOTE — OB RN PATIENT PROFILE - WEIGHT METHOD
HYPERTENSION visit     BP Readings from Last 3 Encounters:   19 (!) 140/80   18 (!) 150/70   18 (!) 180/99       LDL Calculated (mg/dL)   Date Value   2016 84     LDL Cholesterol (mg/dL)   Date Value   2018 70     HDL (mg/dL)   Date Value   2018 46     BUN (mg/dL)   Date Value   2018 21     CREATININE (mg/dL)   Date Value   2018 1.11     Glucose   Date Value   2018 82 mg/dL   2016 211 mg/dl (H)              Have you changed or started any medications since your last visit including any over-the-counter medicines, vitamins, or herbal medicines? no   Have you stopped taking any of your medications? Is so, why? -  no  Are you having any side effects from any of your medications? - no  How often do you miss doses of your medication? rare      Have you seen any other physician or provider since your last visit?  no   Have you had any other diagnostic tests since your last visit?  no   Have you been seen in the emergency room and/or had an admission in a hospital since we last saw you?  no   Have you had your routine dental cleaning in the past 6 months? Do you have an active MyChart account? If no, what is the barrier?   No:     Patient Care Team:  Jason Mccann MD as PCP - General (Family Medicine)  Jason Mccann MD as PCP - [facility] Provider  Brittany Lopez MD as Consulting Physician  JEREMIAS Page - CNP (Nurse Practitioner)  Yvette Oviedo MD as Consulting Physician (Endocrinology)  Malik Wilkins MD as Consulting Physician (Gastroenterology)    Medical History Review  Past Medical, Family, and Social History reviewed and contribute to the patient presenting condition    Health Maintenance   Topic Date Due    DTaP/Tdap/Td vaccine (1 - Tdap) 07[birth year]10/1959    Annual Wellness Visit (AWV)  07/10/2003    Potassium monitoring  2019    Creatinine monitoring  2019    Shingles Vaccine (2 of 3) 2020 (Originally 9/16/2014)    Flu vaccine  Completed    Pneumococcal 65+ years Vaccine  Completed stated

## 2024-07-11 NOTE — OB PROVIDER H&P - NS_OBGYNHISTORY_OBGYN_ALL_OB_FT
– PNC: GDMA1. GBS negative. EFW 3400g extrapolated from sono in office 1 week ago.  – OBHx: Primigravida. Denies miscarriages, terminations, ectopic pregnancies  – GynHx: Denies fibroids, ovarian cysts, abnormal pap smears, STIs

## 2024-07-12 ENCOUNTER — APPOINTMENT (OUTPATIENT)
Dept: OBGYN | Facility: CLINIC | Age: 29
End: 2024-07-12

## 2024-07-12 LAB
GLUCOSE BLDC GLUCOMTR-MCNC: 103 MG/DL — HIGH (ref 70–99)
GLUCOSE BLDC GLUCOMTR-MCNC: 76 MG/DL — SIGNIFICANT CHANGE UP (ref 70–99)
GLUCOSE BLDC GLUCOMTR-MCNC: 83 MG/DL — SIGNIFICANT CHANGE UP (ref 70–99)
GLUCOSE BLDC GLUCOMTR-MCNC: 83 MG/DL — SIGNIFICANT CHANGE UP (ref 70–99)
GLUCOSE BLDC GLUCOMTR-MCNC: 86 MG/DL — SIGNIFICANT CHANGE UP (ref 70–99)
GLUCOSE BLDC GLUCOMTR-MCNC: 87 MG/DL — SIGNIFICANT CHANGE UP (ref 70–99)
GLUCOSE BLDC GLUCOMTR-MCNC: 88 MG/DL — SIGNIFICANT CHANGE UP (ref 70–99)
GLUCOSE BLDC GLUCOMTR-MCNC: 96 MG/DL — SIGNIFICANT CHANGE UP (ref 70–99)
T PALLIDUM AB TITR SER: NEGATIVE — SIGNIFICANT CHANGE UP

## 2024-07-12 PROCEDURE — 59400 OBSTETRICAL CARE: CPT

## 2024-07-12 RX ORDER — SODIUM CHLORIDE 0.9 % (FLUSH) 0.9 %
500 SYRINGE (ML) INJECTION ONCE
Refills: 0 | Status: COMPLETED | OUTPATIENT
Start: 2024-07-12 | End: 2024-07-12

## 2024-07-12 RX ORDER — BENZOCAINE 15 %
1 LIQUID (ML) TOPICAL EVERY 6 HOURS
Refills: 0 | Status: DISCONTINUED | OUTPATIENT
Start: 2024-07-12 | End: 2024-07-14

## 2024-07-12 RX ORDER — OXYTOCIN 30 [USP'U]/500ML
INJECTION, SOLUTION INTRAVENOUS
Qty: 30 | Refills: 0 | Status: DISCONTINUED | OUTPATIENT
Start: 2024-07-12 | End: 2024-07-12

## 2024-07-12 RX ORDER — LANOLIN
1 WAX (GRAM) MISCELLANEOUS EVERY 6 HOURS
Refills: 0 | Status: DISCONTINUED | OUTPATIENT
Start: 2024-07-12 | End: 2024-07-14

## 2024-07-12 RX ORDER — OXYTOCIN 30 [USP'U]/500ML
10 INJECTION, SOLUTION INTRAVENOUS ONCE
Refills: 0 | Status: COMPLETED | OUTPATIENT
Start: 2024-07-12 | End: 2024-07-12

## 2024-07-12 RX ORDER — DIBUCAINE 1 %
1 OINTMENT (GRAM) TOPICAL EVERY 6 HOURS
Refills: 0 | Status: DISCONTINUED | OUTPATIENT
Start: 2024-07-12 | End: 2024-07-14

## 2024-07-12 RX ORDER — DIPHENHYDRAMINE HCL 12.5MG/5ML
25 ELIXIR ORAL EVERY 6 HOURS
Refills: 0 | Status: DISCONTINUED | OUTPATIENT
Start: 2024-07-12 | End: 2024-07-14

## 2024-07-12 RX ORDER — HYDROCORTISONE VALERATE 0.2 %
1 CREAM (GRAM) TOPICAL EVERY 6 HOURS
Refills: 0 | Status: DISCONTINUED | OUTPATIENT
Start: 2024-07-12 | End: 2024-07-14

## 2024-07-12 RX ORDER — HYDROCORTISONE ACETATE 1 %
1 OINTMENT (GRAM) RECTAL EVERY 4 HOURS
Refills: 0 | Status: DISCONTINUED | OUTPATIENT
Start: 2024-07-12 | End: 2024-07-14

## 2024-07-12 RX ORDER — OXYTOCIN 30 [USP'U]/500ML
4 INJECTION, SOLUTION INTRAVENOUS
Qty: 30 | Refills: 0 | Status: DISCONTINUED | OUTPATIENT
Start: 2024-07-12 | End: 2024-07-12

## 2024-07-12 RX ORDER — SIMETHICONE 40MG/0.6ML
80 SUSPENSION, DROPS(FINAL DOSAGE FORM)(ML) ORAL EVERY 4 HOURS
Refills: 0 | Status: DISCONTINUED | OUTPATIENT
Start: 2024-07-12 | End: 2024-07-14

## 2024-07-12 RX ORDER — ACETAMINOPHEN 325 MG
975 TABLET ORAL
Refills: 0 | Status: DISCONTINUED | OUTPATIENT
Start: 2024-07-12 | End: 2024-07-14

## 2024-07-12 RX ORDER — PRENATAL VIT/IRON FUM/FOLIC AC 60 MG-1 MG
1 TABLET ORAL DAILY
Refills: 0 | Status: DISCONTINUED | OUTPATIENT
Start: 2024-07-12 | End: 2024-07-14

## 2024-07-12 RX ORDER — OXYTOCIN 30 [USP'U]/500ML
41.67 INJECTION, SOLUTION INTRAVENOUS
Qty: 20 | Refills: 0 | Status: DISCONTINUED | OUTPATIENT
Start: 2024-07-12 | End: 2024-07-14

## 2024-07-12 RX ORDER — OXYCODONE HYDROCHLORIDE 100 MG/5ML
5 SOLUTION ORAL
Refills: 0 | Status: DISCONTINUED | OUTPATIENT
Start: 2024-07-12 | End: 2024-07-14

## 2024-07-12 RX ORDER — KETOROLAC TROMETHAMINE 30 MG/ML
30 INJECTION, SOLUTION INTRAMUSCULAR ONCE
Refills: 0 | Status: DISCONTINUED | OUTPATIENT
Start: 2024-07-12 | End: 2024-07-12

## 2024-07-12 RX ORDER — TETANUS TOXOID, REDUCED DIPHTHERIA TOXOID AND ACELLULAR PERTUSSIS VACCINE, ADSORBED 5; 2.5; 8; 8; 2.5 [IU]/.5ML; [IU]/.5ML; UG/.5ML; UG/.5ML; UG/.5ML
0.5 SUSPENSION INTRAMUSCULAR ONCE
Refills: 0 | Status: DISCONTINUED | OUTPATIENT
Start: 2024-07-12 | End: 2024-07-14

## 2024-07-12 RX ORDER — OXYCODONE HYDROCHLORIDE 100 MG/5ML
5 SOLUTION ORAL ONCE
Refills: 0 | Status: DISCONTINUED | OUTPATIENT
Start: 2024-07-12 | End: 2024-07-14

## 2024-07-12 RX ORDER — SODIUM CHLORIDE 0.9 % (FLUSH) 0.9 %
3 SYRINGE (ML) INJECTION EVERY 8 HOURS
Refills: 0 | Status: DISCONTINUED | OUTPATIENT
Start: 2024-07-12 | End: 2024-07-14

## 2024-07-12 RX ORDER — PRAMOXINE HCL 1 %
1 CREAM (GRAM) RECTAL EVERY 4 HOURS
Refills: 0 | Status: DISCONTINUED | OUTPATIENT
Start: 2024-07-12 | End: 2024-07-14

## 2024-07-12 RX ORDER — ONDANSETRON HYDROCHLORIDE 2 MG/ML
4 INJECTION INTRAMUSCULAR; INTRAVENOUS ONCE
Refills: 0 | Status: COMPLETED | OUTPATIENT
Start: 2024-07-12 | End: 2024-07-12

## 2024-07-12 RX ADMIN — ONDANSETRON HYDROCHLORIDE 4 MILLIGRAM(S): 2 INJECTION INTRAMUSCULAR; INTRAVENOUS at 14:05

## 2024-07-12 RX ADMIN — OXYTOCIN 10 UNIT(S): 30 INJECTION, SOLUTION INTRAVENOUS at 16:50

## 2024-07-12 RX ADMIN — Medication 500 MILLILITER(S): at 12:27

## 2024-07-12 RX ADMIN — Medication 975 MILLIGRAM(S): at 21:43

## 2024-07-12 RX ADMIN — OXYTOCIN 2 MILLIUNIT(S)/MIN: 30 INJECTION, SOLUTION INTRAVENOUS at 07:41

## 2024-07-12 RX ADMIN — Medication 975 MILLIGRAM(S): at 22:30

## 2024-07-12 RX ADMIN — KETOROLAC TROMETHAMINE 30 MILLIGRAM(S): 30 INJECTION, SOLUTION INTRAMUSCULAR at 18:35

## 2024-07-12 RX ADMIN — OXYTOCIN 125 MILLIUNIT(S)/MIN: 30 INJECTION, SOLUTION INTRAVENOUS at 16:47

## 2024-07-12 RX ADMIN — Medication 1000 MILLILITER(S): at 14:02

## 2024-07-12 RX ADMIN — DEXTROSE MONOHYDRATE AND SODIUM CHLORIDE 125 MILLILITER(S): 5; .3 INJECTION, SOLUTION INTRAVENOUS at 08:35

## 2024-07-12 RX ADMIN — Medication 125 MILLILITER(S): at 13:57

## 2024-07-12 RX ADMIN — Medication 600 MILLIGRAM(S): at 23:29

## 2024-07-12 NOTE — OB RN DELIVERY SUMMARY - NSSELHIDDEN_OBGYN_ALL_OB_FT
[NS_DeliveryAttending1_OBGYN_ALL_OB_FT:[id number]],[NS_DeliveryAssist1_OBGYN_ALL_OB_FT:[id number]],[NS_DeliveryRN_OBGYN_ALL_OB_FT:[id number]]

## 2024-07-12 NOTE — PRE-ANESTHESIA EVALUATION ADULT - NSANTHPMHFT_GEN_ALL_CORE
no known cardiac disease  asthma - controlled, no exacerbations, no hospitalizations  MS - no current symptoms or therapies. 1 isolated incident of right eye blindness, that has since resolved >1 year ago

## 2024-07-12 NOTE — OB PROVIDER LABOR PROGRESS NOTE - NS_OBIHIFHRDETAILS_OBGYN_ALL_OB_FT
140, mod bakari, +accels, -decels
145,moderate, +accels, no decels
cat1
145, mod bakari, - accels, -decels

## 2024-07-12 NOTE — OB PROVIDER LABOR PROGRESS NOTE - ASSESSMENT
28F  39.6wks gestational age admitted for a scheduled IOL for GDMA1.    -Cervical balloon placement discussed with pt, pt verbalized understanding and agreement, all questions answered  -Cook cervical balloon placed with without complications, pt tolerated the procedure well  -Continue with PO cytotec  -GDMA1: , continue to monitor FS every 4hrs  -Continue EFM/toco    Frances Burgos PA-C  
IOL 40wk a1gdm. progressing in labor, no change since arom. continue pitocin. EPI top off at this time. 
Cat 1 tracing  Can have top off  Pitocin at 4  Cont EFM/TOCO  Anticipate     D/w Dr. Kerry Maciel, PGY-2
Cat 1 tracing  Cont pitocin  Cont EFM/TOCO  Anticipate     D/w Dr. Kerry Maciel, PGY-2
- cervical balloon expelled   - pt making cervical change  - will start pitocin at 7am  - dc cytotec     Shadia Centeno, PGY4  d/w Dr. Botello

## 2024-07-12 NOTE — OB RN DELIVERY SUMMARY - NS_SEPSISRSKCALC_OBGYN_ALL_OB_FT
EOS calculated successfully. EOS Risk Factor: 0.5/1000 live births ([facility] national incidence); GA=40w;Temp=98.96; ROM=8.867; GBS='Negative'; Antibiotics='No antibiotics or any antibiotics < 2 hrs prior to birth'

## 2024-07-12 NOTE — OB RN DELIVERY SUMMARY - NS_ADMITROM_OBGYN_ALL_OB
"ED Provider Note    ED Provider Note      Primary care provider: JONO Powell    CHIEF COMPLAINT  Chief Complaint   Patient presents with   • Toe Injury     Pt BIB wife c/o left middle toe injury apprx 1 hour ago. Pt states he stepped on \"something, but unaware of what it was\" Pt c/o numbness to injured toe. Pt is concerned whatever he stepped on \"is still stuck in there\"       HPI  Julien Dao is a 88 y.o. male who presents to the Emergency Department chief complaint of left 3rd toe pain. Patient stated he was going to the kitchen to get a couple milk this evening. Foot when he twisted his foot on a rug and felt a sharp pain in the left 3rd toe. Upon examination he had small cut large amount of bleeding. Patient reports that he is concerned that there may be a foreign body within the toe in that he had some numbness in the toe which is now improving pain minimal at this time worse with ambulation better with rest and elevation. No other injuries no recent illness or other concern.    REVIEW OF SYSTEMS  Pertinent positives include left 3rd toe pain, numbness, laceration. Pertinent negatives include no other injury recent fevers chills or other illness.     PAST MEDICAL HISTORY   has a past medical history of CAD (coronary artery disease); Hypertension; and DM (diabetes mellitus) ([facility]) (1/7/2014).    SURGICAL HISTORY   has past surgical history that includes other cardiac surgery.    SOCIAL HISTORY  Social History   Substance Use Topics   • Smoking status: Former Smoker     Quit date: 10/19/1972   • Smokeless tobacco: Never Used   • Alcohol Use: No      History   Drug Use No       FAMILY HISTORY  Non-Contributory    CURRENT MEDICATIONS  Home Medications     **Home medications have not yet been reviewed for this encounter**          ALLERGIES  Allergies   Allergen Reactions   • Iodine        PHYSICAL EXAM  VITAL SIGNS: /70 mmHg  Pulse 68  Temp(Src) 36.8 °C (98.3 °F)  Resp 18  Ht 1.702 m (5' " "7.01\")  Wt 79.9 kg (176 lb 2.4 oz)  BMI 27.58 kg/m2  SpO2 93%  Pulse ox interpretation: I interpret this pulse ox as normal.  Constitutional: Alert and oriented x 3, minimal Distress    Cardiovascular: Regular rate and rhythm no murmur rub or gallop 2+ pulses peripherally x4  Thorax & Lungs: No respiratory distress, no wheezes rales or rhonchi, No chest tenderness.       Skin: Half centimeter linear laceration on the plantar aspect of the distal phalanx of the left 3rd toe very superficial not amenable to repair no obvious evidence of foreign body no active bleeding  Musculoskeletal: Toe laceration as above otherwise unremarkable for acute deformity  Neurologic: Cranial nerves III through XII are grossly intact, no sensory deficit, no cerebellar dysfunction   Psychiatric: Appropriate affect for situation at this time            RADIOLOGY  DX-FOOT-COMPLETE 3+ LEFT   Final Result      No radiographic evidence of acute traumatic injury or metallic foreign body.        The radiologist's interpretation of all radiological studies have been reviewed by me.    COURSE & MEDICAL DECISION MAKING  Pertinent Labs & Imaging studies reviewed. (See chart for details)      Medical Decision Making: X-ray as above tetanus updated patient's injury cleansed and bandaged return for worsening pain numbness any other acute concerns otherwise follow-up with primary care for wound check I due to concerns of diabetes.  /74 mmHg  Pulse 69  Temp(Src) 36.8 °C (98.2 °F)  Resp 20  Ht 1.702 m (5' 7.01\")  Wt 79.9 kg (176 lb 2.4 oz)  BMI 27.58 kg/m2  SpO2 94%    Angela Keyes, A.P.NLuis Miguel  [address] [facility] [address]  [phone]      As needed    [facility], Emergency Dept  [address]  [facility] [address] [phone]    If symptoms worsen      FINAL IMPRESSION  1. Pain of toe of left foot    2. Laceration of toe of left foot without foreign body present or damage to nail, unspecified toe, initial " encounter           This dictation has been created using voice recognition software and/or scribes. The accuracy of the dictation is limited by the abilities of the software and the expertise of the scribes. I expect there may be some errors of grammar and possibly content. I made every attempt to manually correct the errors within my dictation. However, errors related to voice recognition software and/or scribes may still exist and should be interpreted within the appropriate context.               No

## 2024-07-12 NOTE — OB PROVIDER DELIVERY SUMMARY - NSPROVIDERDELIVERYNOTE_OBGYN_ALL_OB_FT
Pt was fully dilated at 3+ station and pushed to deliver a live male infant over an intact RMLE. The head and shoulders delivered atraumatically, the baby cried spontaneously and was placed on mother's abdomen. Delayed cord clamping was done for 30 seconds. The cord was clamped and cut. The placenta delivered spontaneously intact. She had some mild atony and required additional IM pitocin. The cervix vagina and perineum were evaluated and the 2nd degree laceration was repaired with 2-0 and 3-0 vicryl rapide. Excellent hemostasis was noted.

## 2024-07-12 NOTE — OB PROVIDER LABOR PROGRESS NOTE - NS_SUBJECTIVE/OBJECTIVE_OBGYN_ALL_OB_FT
Pt evaluated at bedside for a cervical exam. Pt comfortable with an epidural.    ICU Vital Signs Last 24 Hrs  T(C): 36.7 (12 Jul 2024 02:31), Max: 36.7 (11 Jul 2024 23:49)  T(F): 98.06 (11 Jul 2024 23:49), Max: 98.06 (11 Jul 2024 23:49)  HR: 62 (12 Jul 2024 02:48) (62 - 114)  BP: 107/56 (12 Jul 2024 02:44) (97/50 - 141/78)  RR: 17 (12 Jul 2024 02:31) (17 - 17)  SpO2: 98% (12 Jul 2024 02:48) (96% - 99%)    O2 Parameters below as of 11 Jul 2024 20:52  Patient On (Oxygen Delivery Method): room air
VE performed in the setting of increased pain and rectal pressure.
Pt seen and examined for progress. exam unchanged.
VE due to deceleration
VE performed to assess for cervical change. Head well applied. AROM'ed for clear fluids.

## 2024-07-13 ENCOUNTER — TRANSCRIPTION ENCOUNTER (OUTPATIENT)
Age: 29
End: 2024-07-13

## 2024-07-13 RX ORDER — ACETAMINOPHEN 325 MG
3 TABLET ORAL
Qty: 0 | Refills: 0 | DISCHARGE
Start: 2024-07-13

## 2024-07-13 RX ORDER — PRENATAL VIT/IRON FUM/FOLIC AC 60 MG-1 MG
1 TABLET ORAL
Qty: 0 | Refills: 0 | DISCHARGE

## 2024-07-13 RX ADMIN — Medication 975 MILLIGRAM(S): at 15:03

## 2024-07-13 RX ADMIN — Medication 975 MILLIGRAM(S): at 10:52

## 2024-07-13 RX ADMIN — Medication 600 MILLIGRAM(S): at 17:55

## 2024-07-13 RX ADMIN — Medication 975 MILLIGRAM(S): at 03:55

## 2024-07-13 RX ADMIN — Medication 600 MILLIGRAM(S): at 13:33

## 2024-07-13 RX ADMIN — Medication 600 MILLIGRAM(S): at 17:37

## 2024-07-13 RX ADMIN — Medication 975 MILLIGRAM(S): at 21:07

## 2024-07-13 RX ADMIN — Medication 975 MILLIGRAM(S): at 14:33

## 2024-07-13 RX ADMIN — Medication 975 MILLIGRAM(S): at 03:13

## 2024-07-13 RX ADMIN — Medication 600 MILLIGRAM(S): at 00:08

## 2024-07-13 RX ADMIN — Medication 600 MILLIGRAM(S): at 05:20

## 2024-07-13 RX ADMIN — Medication 600 MILLIGRAM(S): at 05:52

## 2024-07-13 RX ADMIN — Medication 600 MILLIGRAM(S): at 13:03

## 2024-07-13 RX ADMIN — Medication 975 MILLIGRAM(S): at 10:22

## 2024-07-13 RX ADMIN — Medication 1 TABLET(S): at 13:03

## 2024-07-13 NOTE — DISCHARGE NOTE OB - PATIENT PORTAL LINK FT
You can access the FollowMyHealth Patient Portal offered by [facility] by registering at the following website: [email or web address]. By joining [facility]’s FollowMyHealth portal, you will also be able to view your health information using other applications (apps) compatible with our system.

## 2024-07-13 NOTE — DISCHARGE NOTE OB - CARE PROVIDER_API CALL
Kavitha Ying  Obstetrics and Gynecology  [address] [facility]  [place] [address]  Phone: [phone]  Fax: [phone]  Follow Up Time:

## 2024-07-13 NOTE — DISCHARGE NOTE OB - NPI NUMBER (FOR SYSADMIN USE ONLY) :
[[id number]] I will SWITCH the dose or number of times a day I take the medications listed below when I get home from the hospital:  None

## 2024-07-13 NOTE — DISCHARGE NOTE OB - MEDICATION SUMMARY - MEDICATIONS TO STOP TAKING
I will STOP taking the medications listed below when I get home from the hospital:    predniSONE 20 mg oral tablet  -- 1 tab(s) by mouth once a day MDD:20mg    cholecalciferol oral tablet  -- 2000 unit(s) by mouth once a day    Protonix 40 mg oral delayed release tablet  -- 1 tab(s) by mouth once a day MDD:40mg

## 2024-07-13 NOTE — DISCHARGE NOTE OB - MEDICATION SUMMARY - MEDICATIONS TO TAKE
I will START or STAY ON the medications listed below when I get home from the hospital:    ibuprofen 600 mg oral tablet  -- 1 tab(s) by mouth every 6 hours  -- Indication: For Moderate pain    acetaminophen 325 mg oral tablet  -- 3 tab(s) by mouth every 6 hours  -- Indication: For Mild pain    Prenatal Multivitamins Folic Acid 0.4 mg oral tablet  -- 1 tab(s) by mouth once a day  -- Indication: For home medication

## 2024-07-13 NOTE — PROGRESS NOTE ADULT - ASSESSMENT
29yo PPD#1 s/p .  Patient is stable and doing well post-partum.     - Pain well controlled, continue current pain regimen  - Increase ambulation, SCDs when not ambulating  - Continue regular diet    Tayler Hsu, PGY-1 Otezla Counseling: The side effects of Otezla were discussed with the patient, including but not limited to worsening or new depression, weight loss, diarrhea, nausea, upper respiratory tract infection, and headache. Patient instructed to call the office should any adverse effect occur.  The patient verbalized understanding of the proper use and possible adverse effects of Otezla.  All the patient's questions and concerns were addressed.

## 2024-07-13 NOTE — DISCHARGE NOTE OB - YES, WALK AS TOLERATED
Problem: Patient Care Overview  Goal: Plan of Care Review  Outcome: Ongoing (interventions implemented as appropriate)  Flowsheets (Taken 2020 9751)  Progress: improving  Outcome Summary: V/S stable, voiding and stooling, Bili is High Intermediate this am, formula changed to Sim Sensitive, Plans D/C home today  Care Plan Reviewed With: mother     
Statement Selected

## 2024-07-13 NOTE — PROGRESS NOTE ADULT - ATTENDING COMMENTS
P1 PPD#1 s/p . Doing well pp. VSS  -Fundus firm, midline  -lochia WNL  -pain well controlled  -c/w routine pp care    DIXIE Ying MD

## 2024-07-13 NOTE — CHART NOTE - NSCHARTNOTEFT_GEN_A_CORE
Patient seen for: Nutrition consult for GDM postpartum education prior to discharge    Source: Patient, Electronic Medical Record    Patient is: ; GDMA1    Chart reviewed, events noted. Meds/Labs reviewed.    Anthropometrics:  Height: 5 feet 3 inches  Pre-pregnancy weight: 170 pounds   Weight gain: 31 pounds   Admit/Dosing wt: 201 pounds     Nutrition Diagnosis:   Food and Nutrition Related Knowledge Deficit related to limited previous exposure to postpartum GDM nutrition education and recommendations as evidenced by GDM postpartum.    Goal: Pt to state at least two teach back points.    Intervention:  1. Education: Pt educated on postpartum dietary recommendations, including risk of development of T2DM; reinforced importance of DM screening 4-12 weeks postpartum. Reviewed nutrition recommendations for breast feeding, including adequate protein, calorie, and fluid intake.   2. Handout: "What to expect now that you have had your baby"     Monitoring:  No other nutrition risks were identified during this encounter.  RD remains available upon request and will follow up per protocol.    Katiana Colon, ELISABETH, CDN  TEAMS

## 2024-07-13 NOTE — DISCHARGE NOTE OB - NS MD DC FALL RISK RISK
For information on Fall & Injury Prevention, visit: [email or web address] OR  [email or web address] OR  https://www.cdc.gov/steadi/patient.html

## 2024-07-14 VITALS
HEART RATE: 75 BPM | TEMPERATURE: 98 F | DIASTOLIC BLOOD PRESSURE: 72 MMHG | RESPIRATION RATE: 18 BRPM | OXYGEN SATURATION: 95 % | SYSTOLIC BLOOD PRESSURE: 104 MMHG

## 2024-07-14 PROCEDURE — 86900 BLOOD TYPING SEROLOGIC ABO: CPT

## 2024-07-14 PROCEDURE — 82962 GLUCOSE BLOOD TEST: CPT

## 2024-07-14 PROCEDURE — 86850 RBC ANTIBODY SCREEN: CPT

## 2024-07-14 PROCEDURE — 86901 BLOOD TYPING SEROLOGIC RH(D): CPT

## 2024-07-14 PROCEDURE — 86780 TREPONEMA PALLIDUM: CPT

## 2024-07-14 PROCEDURE — 36415 COLL VENOUS BLD VENIPUNCTURE: CPT

## 2024-07-14 PROCEDURE — 59050 FETAL MONITOR W/REPORT: CPT

## 2024-07-14 PROCEDURE — 85025 COMPLETE CBC W/AUTO DIFF WBC: CPT

## 2024-07-14 RX ADMIN — Medication 600 MILLIGRAM(S): at 00:12

## 2024-07-14 RX ADMIN — Medication 600 MILLIGRAM(S): at 05:42

## 2024-07-14 RX ADMIN — Medication 975 MILLIGRAM(S): at 09:57

## 2024-07-14 NOTE — PROGRESS NOTE ADULT - PROBLEM SELECTOR PLAN 1
- Pain well controlled, continue current pain regimen  - Increase ambulation, SCDs when not ambulating  - Continue regular diet  - Discharge planning     DIXIE Ying MD

## 2024-07-15 ENCOUNTER — APPOINTMENT (OUTPATIENT)
Dept: OBGYN | Facility: CLINIC | Age: 29
End: 2024-07-15

## 2024-07-15 ENCOUNTER — APPOINTMENT (OUTPATIENT)
Dept: OBGYN | Facility: HOSPITAL | Age: 29
End: 2024-07-15

## 2024-07-16 ENCOUNTER — APPOINTMENT (OUTPATIENT)
Dept: OBGYN | Facility: CLINIC | Age: 29
End: 2024-07-16

## 2024-07-22 ENCOUNTER — APPOINTMENT (OUTPATIENT)
Dept: OBGYN | Facility: CLINIC | Age: 29
End: 2024-07-22

## 2024-07-25 NOTE — OB RN PATIENT PROFILE - PRO HIV INFANT
CM met with patient  to review discharge recommendation of rehab and is agreeable to plan    Patient/family provided list of facilities in-network with patient's payor plan. Providers that are owned, operated, or affiliated with [facility] are included on the list.     Notified that referral sent to below listed facilities from in-network list based on proximity to home/family support:   1.[facility]     Patient/family instructed to identify preference.    Preferred Facility: (if more than 1, listed in order of descending preference)  1.[facility]    If an additional preferred facility not listed above is identified, additional referral to be sent. If above facilities unable to accept, will send additional referrals to in-network providers.     negative

## 2024-08-14 PROBLEM — G35 MULTIPLE SCLEROSIS: Chronic | Status: ACTIVE | Noted: 2024-07-11

## 2024-08-22 ENCOUNTER — APPOINTMENT (OUTPATIENT)
Dept: OBGYN | Facility: CLINIC | Age: 29
End: 2024-08-22
Payer: COMMERCIAL

## 2024-08-22 VITALS
SYSTOLIC BLOOD PRESSURE: 112 MMHG | BODY MASS INDEX: 31.18 KG/M2 | HEIGHT: 63 IN | WEIGHT: 176 LBS | DIASTOLIC BLOOD PRESSURE: 73 MMHG

## 2024-08-22 PROCEDURE — 0503F POSTPARTUM CARE VISIT: CPT

## 2024-08-23 LAB — HPV HIGH+LOW RISK DNA PNL CVX: NOT DETECTED

## 2024-08-27 LAB — CYTOLOGY CVX/VAG DOC THIN PREP: NORMAL

## 2024-09-04 ENCOUNTER — NON-APPOINTMENT (OUTPATIENT)
Age: 29
End: 2024-09-04

## 2024-09-04 ENCOUNTER — APPOINTMENT (OUTPATIENT)
Dept: OPHTHALMOLOGY | Facility: CLINIC | Age: 29
End: 2024-09-04
Payer: COMMERCIAL

## 2024-09-04 PROCEDURE — 92133 CPTRZD OPH DX IMG PST SGM ON: CPT

## 2024-09-04 PROCEDURE — 92083 EXTENDED VISUAL FIELD XM: CPT

## 2024-09-04 PROCEDURE — 99214 OFFICE O/P EST MOD 30 MIN: CPT

## 2024-09-11 ENCOUNTER — APPOINTMENT (OUTPATIENT)
Dept: NEUROLOGY | Facility: CLINIC | Age: 29
End: 2024-09-11
Payer: COMMERCIAL

## 2024-09-11 VITALS
BODY MASS INDEX: 31.01 KG/M2 | WEIGHT: 175 LBS | DIASTOLIC BLOOD PRESSURE: 71 MMHG | OXYGEN SATURATION: 79 % | SYSTOLIC BLOOD PRESSURE: 101 MMHG | HEART RATE: 98 BPM | HEIGHT: 63 IN

## 2024-09-11 DIAGNOSIS — G37.9 DEMYELINATING DISEASE OF CENTRAL NERVOUS SYSTEM, UNSPECIFIED: ICD-10-CM

## 2024-09-11 PROCEDURE — G2211 COMPLEX E/M VISIT ADD ON: CPT | Mod: NC

## 2024-09-11 PROCEDURE — 99215 OFFICE O/P EST HI 40 MIN: CPT

## 2024-09-11 NOTE — DATA REVIEWED
[de-identified] : MRI brain w/w/o 4/23/2023- stable\par  \par  MRI brain- 3 non enhancing lesions- R ? juxtacortical, L ovoid corona radiata lesion with ? central vein sign, and R brachium pontis lesion. \par  MRI orbits- enhancement of right optic nerve c/w optic neuritis.\par  MRI C and T spine 12/15/2022- normal cord signal.

## 2024-09-11 NOTE — DATA REVIEWED
[de-identified] : MRI brain w/w/o 4/23/2023- stable\par  \par  MRI brain- 3 non enhancing lesions- R ? juxtacortical, L ovoid corona radiata lesion with ? central vein sign, and R brachium pontis lesion. \par  MRI orbits- enhancement of right optic nerve c/w optic neuritis.\par  MRI C and T spine 12/15/2022- normal cord signal.

## 2024-09-11 NOTE — HISTORY OF PRESENT ILLNESS
[FreeTextEntry1] : INTERIM HX 2024: Pt delivered baby in July, . She had a healthy pregnancy. Saw Dr Harris 24, VA 20/40 OD and 20/20 OS- stable, patient also has dry eyes.  Last month she experienced intermittent tingling in toes, however, since yesterday the tingling in toes has been constant. She denies any lower back pain or radicular pains. No swelling in feet. No weakness. Balance is okay but walking feels off.   INTERIM HX 10/18/2023: Overall stable. No worsening or new symptoms. R eye gets transiently blurry when frying food or when in hot shower. Been on generic Tecfidera, lately has been causing hives over arms and burning sensation over face, lasts 30 min.   INTERIM HX 2023: Saw Dr. Harris 23, vision the same as encounter in , 20/50 OD.  Overall vision has improved, most significant improvement noted early in . at my last visit, 1/10- she was only able to count fingers OD.  Tolerating fumarate well. flushing after taking the medication lasts about 5 mins. Diarrhea occurred when starting the medication but has since resolved. Plans for pregnancy, would like to plan for this year.  MRI brain w/w/o 2023- stable No new symptoms.  Leaving for [facility] next month.  ------------------------------------ HPI (initial visit Todd 10, 2023)- PATRICE HERNANDEZ is a 27 year old woman w/ hx of asthma, was [facility] to [facility] for 3 day hx of progressive vision loss OD to visual acuity of only hand motion. MRI brain and orbits was obtained and showed enhancement of right optic nerve c/w optic neuritis, and 3 non enhancing lesions on brain MRI. MRI C and T spine were normal. CSF was clear and showed no unique oligoclonal bands (2 OCB present, but equal in CSF and serum). Serum NMO and MOG negative. ACE negative,  HAIDER 1:320. She was treated with a total of 7 days of IV solumedrol with no significant improvement in vision and then started on plasma exchange x 5 sessions with mild symptom improvement. She was discharged on prednisone taper, completed 2023.   She reports peripheral vision OD is now 65% better and central vision 40% better. No progression or new symptoms.

## 2024-09-11 NOTE — ASSESSMENT
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  Patient just delivered healthy baby 7/2024. She remained off DMT (DMF) during pregnancy.  She reports a healthy pregnancy but in the last day has been experiencing new persistent paresthesia's over toes c/f MS relapse vs neuropathy. Will hold off on steroid at this time unless symptoms progress or enhancing lesions seen on MRI.    Plan: 1. Diagnostic Plan/Imaging: Will plan on repeating brain MRI and C/T spine w/w/o contrast for radiological stability.   2. Disease Modifying therapy plan: resume DMF- will need to restart with titration schedule.  Continue Dimethyl fumarate (generic)- (refilleD) CBC and LFT prior to initiation of therapy, then every 6 months. Starting dose(capsules): 120mg BID for 7 days followed by 240mg BID (maintenance dose).  May take Tecifdera with or without food Discussed common adverse reactions: Flushing, abdominal pain, diarrhea, and nausea. Patient can take non-enteric coated aspirin 30 minutes prior to dose to minimize flushing. Informed patient that Tecfidera can cause decrease in lymphocyte counts and cause liver injury.   Pregnancy category C  3. Symptomatic therapy plan: () Vitamin D3 and B12 supplementations   Return to clinic 6 months, or sooner if needed  The above plan was discussed with PATRICE HERNANDEZ in great detail. PATRICE HERNANDEZ verbalized understanding and agrees with plan as detailed above. She was advised to call our clinic at [phone] for any new or worsening symptoms, or with any questions or concerns. PATRICE HERNANDEZ expressed understanding and all her questions/concerns were addressed.   Brandee Epperson M.D.        End of Visit

## 2024-09-11 NOTE — PHYSICAL EXAM
[FreeTextEntry1] : PHYSICAL EXAM Constitutional: Alert, no acute distress  Psychiatric: appropriate affect and mood Pulmonary: No respiratory distress, stable on room air  NEUROLOGICAL EXAM Mental status: The patient is alert, attentive and conversational memory intact. Speech/language: No dysarthria Cranial nerves: CN II: Visual fields are full to confrontation. Pupil size equal and briskly reactive to light.  CN III, IV, VI: EOMI, no nystagmus, no ptosis CN V: Facial sensation is intact to pinprick in all 3 divisions bilaterally. CN VII: Face is symmetric with normal eye closure and smile. CN VII: Hearing is normal to rubbing fingers CN IX, X: Palate elevates symmetrically.  CN XI: Head turning and shoulder shrug are intact CN XII: Tongue is midline with normal movements and no atrophy. Motor: Strength is full bilaterally. 5/5 muscle power in bilateral UE and LE. Reflexes: R        L  Biceps    2+       2+  Patellar   2+       2+  Achilles  3+       3+ Plantar responses- R down, L down Sensory:      RUE/RLE         LUE/LLE light touch       +/+                     +/+ Pinprick           + /+                     +/+ Vibration         + /+                     +/+ Joint PS          +/+                      +/+ Temp              + /+                      +/+ Coordination/Cerebellar: There is no dysmetria on finger-to-nose and heel to shin.  Gait/Stance: Posture is normal. Gait is slow and cautious. Tandem gait is normal. Romberg is negative.

## 2024-09-11 NOTE — ASSESSMENT
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  Patient just delivered healthy baby 7/2024. She remained off DMT (DMF) during pregnancy.  She reports a healthy pregnancy but in the last day has been experiencing new persistent paresthesia's over toes c/f MS relapse vs neuropathy. Will hold off on steroid at this time unless symptoms progress or enhancing lesions seen on MRI.    Plan: 1. Diagnostic Plan/Imaging: Will plan on repeating brain MRI and C/T spine w/w/o contrast for radiological stability.   2. Disease Modifying therapy plan: resume DMF- will need to restart with titration schedule.  Continue Dimethyl fumarate (generic)- (refilleD) CBC and LFT prior to initiation of therapy, then every 6 months. Starting dose(capsules): 120mg BID for 7 days followed by 240mg BID (maintenance dose).  May take Tecifdera with or without food Discussed common adverse reactions: Flushing, abdominal pain, diarrhea, and nausea. Patient can take non-enteric coated aspirin 30 minutes prior to dose to minimize flushing. Informed patient that Tecfidera can cause decrease in lymphocyte counts and cause liver injury.   Pregnancy category C  3. Symptomatic therapy plan: () Vitamin D3 and B12 supplementations   Return to clinic 6 months, or sooner if needed  The above plan was discussed with PATRICE HERNANDEZ in great detail. PATRICE HERNANDEZ verbalized understanding and agrees with plan as detailed above. She was advised to call our clinic at [phone] for any new or worsening symptoms, or with any questions or concerns. PATRICE HERNANDEZ expressed understanding and all her questions/concerns were addressed.   Brandee Epperson M.D.        End of Visit

## 2024-09-11 NOTE — HISTORY OF PRESENT ILLNESS
[FreeTextEntry1] : INTERIM HX 2024: Pt delivered baby in July, . She had a healthy pregnancy. Saw Dr Harris 24, VA 20/40 OD and 20/20 OS- stable, patient also has dry eyes.  Last month she experienced intermittent tingling in toes, however, since yesterday the tingling in toes has been constant. She denies any lower back pain or radicular pains. No swelling in feet. No weakness. Balance is okay but walking feels off.   INTERIM HX 10/18/2023: Overall stable. No worsening or new symptoms. R eye gets transiently blurry when frying food or when in hot shower. Been on generic Tecfidera, lately has been causing hives over arms and burning sensation over face, lasts 30 min.   INTERIM HX 2023: Saw Dr. Harris 23, vision the same as encounter in , 20/50 OD.  Overall vision has improved, most significant improvement noted early in . at my last visit, 1/10- she was only able to count fingers OD.  Tolerating fumarate well. flushing after taking the medication lasts about 5 mins. Diarrhea occurred when starting the medication but has since resolved. Plans for pregnancy, would like to plan for this year.  MRI brain w/w/o 2023- stable No new symptoms.  Leaving for [facility] next month.  ------------------------------------ HPI (initial visit Todd 10, 2023)- PATRICE HERNANDEZ is a 27 year old woman w/ hx of asthma, was admitted to [facility] 2022-2022 for 3 day hx of progressive vision loss OD to visual acuity of only hand motion. MRI brain and orbits was obtained and showed enhancement of right optic nerve c/w optic neuritis, and 3 non enhancing lesions on brain MRI. MRI C and T spine were normal. CSF was clear and showed no unique oligoclonal bands (2 OCB present, but equal in CSF and serum). Serum NMO and MOG negative. ACE negative,  HAIDER 1:320. She was treated with a total of 7 days of IV solumedrol with no significant improvement in vision and then started on plasma exchange x 5 sessions with mild symptom improvement. She was discharged on prednisone taper, completed 2023.   She reports peripheral vision OD is now 65% better and central vision 40% better. No progression or new symptoms.

## 2024-09-12 ENCOUNTER — APPOINTMENT (OUTPATIENT)
Dept: NEUROLOGY | Facility: CLINIC | Age: 29
End: 2024-09-12

## 2024-09-12 DIAGNOSIS — G35 MULTIPLE SCLEROSIS: ICD-10-CM

## 2024-09-12 LAB
25(OH)D3 SERPL-MCNC: 33.5 NG/ML
ALBUMIN SERPL ELPH-MCNC: 4.3 G/DL
ALP BLD-CCNC: 130 U/L
ALT SERPL-CCNC: 32 U/L
ANION GAP SERPL CALC-SCNC: 12 MMOL/L
AST SERPL-CCNC: 22 U/L
BASOPHILS # BLD AUTO: 0.04 K/UL
BASOPHILS NFR BLD AUTO: 0.5 %
BILIRUB SERPL-MCNC: 0.4 MG/DL
BUN SERPL-MCNC: 12 MG/DL
CALCIUM SERPL-MCNC: 9.8 MG/DL
CHLORIDE SERPL-SCNC: 101 MMOL/L
CO2 SERPL-SCNC: 24 MMOL/L
CREAT SERPL-MCNC: 0.73 MG/DL
EGFR: 115 ML/MIN/1.73M2
EOSINOPHIL # BLD AUTO: 0.16 K/UL
EOSINOPHIL NFR BLD AUTO: 1.8 %
GLUCOSE SERPL-MCNC: 88 MG/DL
HCT VFR BLD CALC: 36.2 %
HGB BLD-MCNC: 10.2 G/DL
IMM GRANULOCYTES NFR BLD AUTO: 0.3 %
LYMPHOCYTES # BLD AUTO: 2.54 K/UL
LYMPHOCYTES NFR BLD AUTO: 29.4 %
MAN DIFF?: NORMAL
MCHC RBC-ENTMCNC: 21.5 PG
MCHC RBC-ENTMCNC: 28.2 GM/DL
MCV RBC AUTO: 76.2 FL
MONOCYTES # BLD AUTO: 0.51 K/UL
MONOCYTES NFR BLD AUTO: 5.9 %
NEUTROPHILS # BLD AUTO: 5.37 K/UL
NEUTROPHILS NFR BLD AUTO: 62.1 %
PLATELET # BLD AUTO: 411 K/UL
POTASSIUM SERPL-SCNC: 4.1 MMOL/L
PROT SERPL-MCNC: 7.5 G/DL
RBC # BLD: 4.75 M/UL
RBC # FLD: 17.2 %
SODIUM SERPL-SCNC: 137 MMOL/L
WBC # FLD AUTO: 8.65 K/UL

## 2024-09-12 PROCEDURE — 96365 THER/PROPH/DIAG IV INF INIT: CPT

## 2024-09-12 RX ORDER — PREDNISONE 10 MG/1
10 TABLET ORAL
Qty: 21 | Refills: 0 | Status: ACTIVE | COMMUNITY
Start: 2024-09-12 | End: 1900-01-01

## 2024-09-12 RX ORDER — PREDNISONE 50 MG/1
50 TABLET ORAL
Qty: 75 | Refills: 0 | Status: ACTIVE | COMMUNITY
Start: 2024-09-12 | End: 1900-01-01

## 2024-09-12 RX ORDER — DIMETHYL FUMARATE 120-240 MG
120 & 240 KIT ORAL
Qty: 1 | Refills: 0 | Status: ACTIVE | COMMUNITY
Start: 2024-09-11 | End: 1900-01-01

## 2024-09-12 RX ORDER — PANTOPRAZOLE 40 MG/1
40 TABLET, DELAYED RELEASE ORAL
Qty: 30 | Refills: 2 | Status: ACTIVE | COMMUNITY
Start: 2024-09-12 | End: 1900-01-01

## 2024-09-12 NOTE — HISTORY OF PRESENT ILLNESS
[Right upper extremity] : Right upper extremity [22g] : 22g [Start Time: ___] : Medication Start Time: [unfilled] [End Time: ___] : Medication End Time: [unfilled] [Medication Name: ___] : Medication Name: [unfilled] [Total Amount Administered: ___] : Total Amount Administered: [unfilled] [IV discontinued. Intact. No signs or symptoms of IV complications noted. Time: ___] : IV discontinued. Intact. No signs or symptoms of IV complications noted. Time: [unfilled] [Patient  instructed to seek medical attention with signs and symptoms of adverse effects] : Patient  instructed to seek medical attention with signs and symptoms of adverse effects [Patient left unit in no acute distress] : Patient left unit in no acute distress [Medications administered as ordered and tolerated well.] : Medications administered as ordered and tolerated well. [de-identified] : AC [de-identified] : 12:50 [de-identified] :     Generic: methylprednisolone Billing unit 5mg  Dx: Multiple sclerosis (G35)  Provider Treatment Prescriber: HOSEA MELO		   IV insertion time:    12:50                                          IV d/c time: 14:01  Total Dose administered:    1g                      Amount of drug waste: none  Start: 12:57                                    Stop: 13:58  NDC#:  7321-9066-11             Lot#:    [id number]                                  Exp: 09/2025  Infusion Rate: 100   ml/hr in  100     mL 0.9% NS (over 1 hour)  VS prior to infusion @ 12:44- HR: 80 BP: 121/71 RR: 12 VS s/p infusion @ 14:00- HR: 83 BP: 113/72 RR: 15  Was there a treatment reaction? No Action Taken: N/A    CPT: 19590  Buy and bill: Yes  Next Appointment: tomorrow 1 pm

## 2024-09-13 ENCOUNTER — APPOINTMENT (OUTPATIENT)
Dept: NEUROLOGY | Facility: CLINIC | Age: 29
End: 2024-09-13

## 2024-09-13 PROCEDURE — 96365 THER/PROPH/DIAG IV INF INIT: CPT

## 2024-09-13 NOTE — HISTORY OF PRESENT ILLNESS
[Yes] : Yes [de-identified] : see screening form [Left upper extremity] : Left upper extremity [22g] : 22g [Start Time: ___] : Medication Start Time: [unfilled] [End Time: ___] : Medication End Time: [unfilled] [Medication Name: ___] : Medication Name: [unfilled] [Total Amount Administered: ___] : Total Amount Administered: [unfilled] [IV discontinued. Intact. No signs or symptoms of IV complications noted. Time: ___] : IV discontinued. Intact. No signs or symptoms of IV complications noted. Time: [unfilled] [Patient  instructed to seek medical attention with signs and symptoms of adverse effects] : Patient  instructed to seek medical attention with signs and symptoms of adverse effects [Patient left unit in no acute distress] : Patient left unit in no acute distress [Medications administered as ordered and tolerated well.] : Medications administered as ordered and tolerated well. [de-identified] : AC [de-identified] : 13:25 [de-identified] :     Generic: methylprednisolone Billing unit 5mg  Dx: Multiple sclerosis (G35)  Provider Treatment Prescriber: HOSEA MELO  IV insertion time:  13:25                                            IV d/c time: 14:35  Total Dose administered:       1g (1,000mg)               Amount of drug waste: none  Start:    13:32                               Stop: 14:33  NDC#: 2046-4953-06        Lot#: [id number]            Exp: 09/2025 NDC#: 0184-7155-33        Lot#: [id number]            Exp: 05/2025  Buy and bill: Yes   CPT: 49393  Infusion Rate: 100 ml/hr in       mL 0.9% NS  Was there a treatment reaction? No Action Taken: N/A  VS prior to infusion @ 13:22- HR: 76 BP: 106/66 RR: 22 VS s/p infusion @ 14:34- HR: 72 BP: 118/63 RR: 18  Next Appointment: tbd if needed

## 2024-09-19 ENCOUNTER — APPOINTMENT (OUTPATIENT)
Dept: MRI IMAGING | Facility: CLINIC | Age: 29
End: 2024-09-19
Payer: COMMERCIAL

## 2024-09-19 PROCEDURE — A9585: CPT

## 2024-09-19 PROCEDURE — 72157 MRI CHEST SPINE W/O & W/DYE: CPT

## 2024-09-19 PROCEDURE — 70553 MRI BRAIN STEM W/O & W/DYE: CPT

## 2024-09-19 PROCEDURE — 0866T QUAN MRI ALYS BRN W/DX MRI: CPT

## 2024-09-19 PROCEDURE — 72156 MRI NECK SPINE W/O & W/DYE: CPT

## 2024-09-23 ENCOUNTER — NON-APPOINTMENT (OUTPATIENT)
Age: 29
End: 2024-09-23

## 2024-10-01 ENCOUNTER — RX RENEWAL (OUTPATIENT)
Age: 29
End: 2024-10-01

## 2024-10-22 ENCOUNTER — INPATIENT (INPATIENT)
Facility: HOSPITAL | Age: 29
LOS: 1 days | Discharge: HOME | DRG: 556 | End: 2024-10-24
Attending: INTERNAL MEDICINE | Admitting: HOSPITALIST
Payer: COMMERCIAL

## 2024-10-22 VITALS
SYSTOLIC BLOOD PRESSURE: 123 MMHG | HEIGHT: 66 IN | HEART RATE: 112 BPM | OXYGEN SATURATION: 99 % | DIASTOLIC BLOOD PRESSURE: 77 MMHG | WEIGHT: 175.93 LBS | RESPIRATION RATE: 19 BRPM | TEMPERATURE: 98 F

## 2024-10-22 DIAGNOSIS — R29.898 OTHER SYMPTOMS AND SIGNS INVOLVING THE MUSCULOSKELETAL SYSTEM: ICD-10-CM

## 2024-10-22 LAB
ALBUMIN SERPL ELPH-MCNC: 4.7 G/DL — SIGNIFICANT CHANGE UP (ref 3.3–5)
ALP SERPL-CCNC: 81 U/L — SIGNIFICANT CHANGE UP (ref 40–120)
ALT FLD-CCNC: 38 U/L — SIGNIFICANT CHANGE UP (ref 10–45)
ANION GAP SERPL CALC-SCNC: 15 MMOL/L — SIGNIFICANT CHANGE UP (ref 5–17)
ANISOCYTOSIS BLD QL: SIGNIFICANT CHANGE UP
APPEARANCE UR: ABNORMAL
APTT BLD: 30 SEC — SIGNIFICANT CHANGE UP (ref 24.5–35.6)
AST SERPL-CCNC: 20 U/L — SIGNIFICANT CHANGE UP (ref 10–40)
BACTERIA # UR AUTO: ABNORMAL /HPF
BASOPHILS # BLD AUTO: 0.16 K/UL — SIGNIFICANT CHANGE UP (ref 0–0.2)
BASOPHILS NFR BLD AUTO: 1.8 % — SIGNIFICANT CHANGE UP (ref 0–2)
BILIRUB SERPL-MCNC: 0.4 MG/DL — SIGNIFICANT CHANGE UP (ref 0.2–1.2)
BILIRUB UR-MCNC: NEGATIVE — SIGNIFICANT CHANGE UP
BUN SERPL-MCNC: 11 MG/DL — SIGNIFICANT CHANGE UP (ref 7–23)
CALCIUM SERPL-MCNC: 10 MG/DL — SIGNIFICANT CHANGE UP (ref 8.4–10.5)
CAST: 1 /LPF — SIGNIFICANT CHANGE UP (ref 0–4)
CHLORIDE SERPL-SCNC: 99 MMOL/L — SIGNIFICANT CHANGE UP (ref 96–108)
CK SERPL-CCNC: 50 U/L — SIGNIFICANT CHANGE UP (ref 25–170)
CO2 SERPL-SCNC: 24 MMOL/L — SIGNIFICANT CHANGE UP (ref 22–31)
COLOR SPEC: YELLOW — SIGNIFICANT CHANGE UP
CREAT SERPL-MCNC: 0.61 MG/DL — SIGNIFICANT CHANGE UP (ref 0.5–1.3)
DACRYOCYTES BLD QL SMEAR: SLIGHT — SIGNIFICANT CHANGE UP
DIFF PNL FLD: NEGATIVE — SIGNIFICANT CHANGE UP
EGFR: 124 ML/MIN/1.73M2 — SIGNIFICANT CHANGE UP
EOSINOPHIL # BLD AUTO: 0.16 K/UL — SIGNIFICANT CHANGE UP (ref 0–0.5)
EOSINOPHIL NFR BLD AUTO: 1.8 % — SIGNIFICANT CHANGE UP (ref 0–6)
GLUCOSE SERPL-MCNC: 105 MG/DL — HIGH (ref 70–99)
GLUCOSE UR QL: NEGATIVE MG/DL — SIGNIFICANT CHANGE UP
HCG SERPL-ACNC: <2 MIU/ML — SIGNIFICANT CHANGE UP
HCT VFR BLD CALC: 35.1 % — SIGNIFICANT CHANGE UP (ref 34.5–45)
HGB BLD-MCNC: 10.4 G/DL — LOW (ref 11.5–15.5)
INR BLD: 1.06 RATIO — SIGNIFICANT CHANGE UP (ref 0.85–1.16)
KETONES UR-MCNC: 15 MG/DL
LEUKOCYTE ESTERASE UR-ACNC: ABNORMAL
LYMPHOCYTES # BLD AUTO: 1.89 K/UL — SIGNIFICANT CHANGE UP (ref 1–3.3)
LYMPHOCYTES # BLD AUTO: 21.2 % — SIGNIFICANT CHANGE UP (ref 13–44)
MACROCYTES BLD QL: SLIGHT — SIGNIFICANT CHANGE UP
MANUAL SMEAR VERIFICATION: SIGNIFICANT CHANGE UP
MCHC RBC-ENTMCNC: 21.6 PG — LOW (ref 27–34)
MCHC RBC-ENTMCNC: 29.6 GM/DL — LOW (ref 32–36)
MCV RBC AUTO: 72.8 FL — LOW (ref 80–100)
MICROCYTES BLD QL: SIGNIFICANT CHANGE UP
MONOCYTES # BLD AUTO: 0.39 K/UL — SIGNIFICANT CHANGE UP (ref 0–0.9)
MONOCYTES NFR BLD AUTO: 4.4 % — SIGNIFICANT CHANGE UP (ref 2–14)
MYELOCYTES NFR BLD: 0.9 % — HIGH (ref 0–0)
NEUTROPHILS # BLD AUTO: 6.24 K/UL — SIGNIFICANT CHANGE UP (ref 1.8–7.4)
NEUTROPHILS NFR BLD AUTO: 68.1 % — SIGNIFICANT CHANGE UP (ref 43–77)
NEUTS BAND # BLD: 1.8 % — SIGNIFICANT CHANGE UP (ref 0–8)
NITRITE UR-MCNC: NEGATIVE — SIGNIFICANT CHANGE UP
OVALOCYTES BLD QL SMEAR: SLIGHT — SIGNIFICANT CHANGE UP
PH UR: 5.5 — SIGNIFICANT CHANGE UP (ref 5–8)
PLAT MORPH BLD: NORMAL — SIGNIFICANT CHANGE UP
PLATELET # BLD AUTO: 380 K/UL — SIGNIFICANT CHANGE UP (ref 150–400)
POIKILOCYTOSIS BLD QL AUTO: SLIGHT — SIGNIFICANT CHANGE UP
POLYCHROMASIA BLD QL SMEAR: SLIGHT — SIGNIFICANT CHANGE UP
POTASSIUM SERPL-MCNC: 3.5 MMOL/L — SIGNIFICANT CHANGE UP (ref 3.5–5.3)
POTASSIUM SERPL-SCNC: 3.5 MMOL/L — SIGNIFICANT CHANGE UP (ref 3.5–5.3)
PROT SERPL-MCNC: 7.7 G/DL — SIGNIFICANT CHANGE UP (ref 6–8.3)
PROT UR-MCNC: NEGATIVE MG/DL — SIGNIFICANT CHANGE UP
PROTHROM AB SERPL-ACNC: 12.1 SEC — SIGNIFICANT CHANGE UP (ref 9.9–13.4)
RBC # BLD: 4.82 M/UL — SIGNIFICANT CHANGE UP (ref 3.8–5.2)
RBC # FLD: 17.3 % — HIGH (ref 10.3–14.5)
RBC BLD AUTO: ABNORMAL
RBC CASTS # UR COMP ASSIST: 4 /HPF — SIGNIFICANT CHANGE UP (ref 0–4)
SODIUM SERPL-SCNC: 138 MMOL/L — SIGNIFICANT CHANGE UP (ref 135–145)
SP GR SPEC: 1.03 — SIGNIFICANT CHANGE UP (ref 1–1.03)
SQUAMOUS # UR AUTO: 9 /HPF — HIGH (ref 0–5)
UROBILINOGEN FLD QL: 0.2 MG/DL — SIGNIFICANT CHANGE UP (ref 0.2–1)
WBC # BLD: 8.92 K/UL — SIGNIFICANT CHANGE UP (ref 3.8–10.5)
WBC # FLD AUTO: 8.92 K/UL — SIGNIFICANT CHANGE UP (ref 3.8–10.5)
WBC UR QL: 65 /HPF — HIGH (ref 0–5)

## 2024-10-22 PROCEDURE — 99223 1ST HOSP IP/OBS HIGH 75: CPT

## 2024-10-22 PROCEDURE — 99285 EMERGENCY DEPT VISIT HI MDM: CPT

## 2024-10-22 RX ORDER — CEFTRIAXONE SODIUM 10 G
1000 VIAL (EA) INJECTION EVERY 24 HOURS
Refills: 0 | Status: DISCONTINUED | OUTPATIENT
Start: 2024-10-23 | End: 2024-10-23

## 2024-10-22 RX ORDER — CEFTRIAXONE SODIUM 10 G
1000 VIAL (EA) INJECTION ONCE
Refills: 0 | Status: COMPLETED | OUTPATIENT
Start: 2024-10-22 | End: 2024-10-22

## 2024-10-22 RX ADMIN — Medication 100 MILLIGRAM(S): at 19:31

## 2024-10-22 NOTE — ED PROVIDER NOTE - PROGRESS NOTE DETAILS
I received this patient in signout.  Patient is a 29-year-old female history of MS presenting due to 8 days of episodic weakness to her lower extremities causing her to have gait abnormalities.  Symptoms resolved after a few minutes.  CT showed no signs of any intracranial pathology and labs showed no signs of any electrode abnormalities.  Neuro evaluate the patient who stated she would benefit from MRIs.  We spoke with patient who states she was concerned with returning back home due to fall risk.  I spoke with the hospitalist who agreed to meet the patient for MRIs.    Jose Ibanez MD (PGY 2)

## 2024-10-22 NOTE — H&P ADULT - NSHPOUTPATIENTPROVIDERS_GEN_ALL_CORE
Roney Cross MD (PCP)   Brandee Epperson MD (Neurology)   Homero Harris MD (Ophthalmology) [phone]

## 2024-10-22 NOTE — CONSULT NOTE ADULT - SUBJECTIVE AND OBJECTIVE BOX
Neurology - Consult Note    -  Spectra: [phone] ([facility]), [phone] ([facility])  -    HPI: Patient PATRICE ROCHA is a 29y (1995) with a PMHx significant for astham, not on meds, MS diagnosed (initially presenting as ON in 2023, later relapse with LE weakness in 2024, on DMF) comes in with intermittent episodes of b/l legs numbness and weakness. It started 10/14 when upon standing she had numbness and tingling in her toes, rapidly progressed upto hips and had weakness as well to a point that she had to sit down, whole symptoms lasted 1 hour and improved on sitting down. It has happened 3 more times since, especially on standing. This is similar t6o her relapse in 09/2024 when she had persistent b/lLE numbness and weakness, found to have demyelinating lesion on spine imaging was started on high dose steroid followed by taper. She denies any fever, chills, cough, burning urination. She does have bacteria and WBC on UA.     All other review of systems is negative unless indicated above.    Allergies:  No Known Allergies      PMHx/PSHx/Family Hx: As above, otherwise see below   Asthma    MS (multiple sclerosis)        Social Hx:  No current use of tobacco, alcohol, or illicit drugs    Medications:  MEDICATIONS  (STANDING):    MEDICATIONS  (PRN):      --------------------------------------------------------------------------------------------------------------------------------------------------------------------------------------------------------------------    Vitals:  T(C): 36.7 (10-22-24 @ 13:25), Max: 36.7 (10-22-24 @ 13:25)  HR: 112 (10-22-24 @ 13:25) (112 - 112)  BP: 123/77 (10-22-24 @ 13:25) (123/77 - 123/77)  RR: 19 (10-22-24 @ 13:25) (19 - 19)  SpO2: 99% (10-22-24 @ 13:25) (99% - 99%)    PHYSICAL EXAM:     General - NAD  Cardiovascular - Peripheral pulses palpable, no edema    NEURO:    Mental status - Awake, Alert, Oriented to person, place, and time. Speech fluent, repetition and naming intact. Follows simple and complex commands. Attention/concentration, and fund of knowledge intact.    Cranial nerves -   PERRL, VFF, EOMI,   face sensation (V1-V3) intact b/l,   facial strength intact without asymmetry b/l,   hearing intact b/l,   palate with symmetric elevation,   trapezius 5/5 strength b/l,   tongue midline on protrusion with full lateral movement    Motor - Normal bulk and tone throughout. No pronator drift.  Strength testing            Deltoid      Biceps      Triceps     Wrist Extension    Wrist Flexion     Interossei         R            5                 5               5                     5                              5                        5                 5  L             5                 5               5                     5                              5                        5                 5              Hip Flexion    Hip Extension    Knee Flexion    Knee Extension    Dorsiflexion    Plantar Flexion  R              5                           5                       5                           5                            5                          5  L              5                           5                        5                           5                            5                          5    Sensation - Light touch AND/OR vibration intact throughout    DTR's -             Biceps      Triceps     Brachioradialis      Patellar    Ankle    Toes/plantar response  R             2+             2+                  2+                       2+            2+                 Down  L              2+             2+                 2+                        2+           2+                 Down    Coordination - Finger to Nose intact b/l. Heel to Lozoya intact b/l. No tremors appreciated    Gait and station - Normal casual gait. Romberg (-)    ---------------------------------------------------------------------------------------------------------------------------------------------------------------------------------------------------------------------------    Labs:                        10.4   8.92  )-----------( 380      ( 22 Oct 2024 15:14 )             35.1     10-22    138  |  99  |  11  ----------------------------<  105[H]  3.5   |  24  |  0.61    Ca    10.0      22 Oct 2024 15:14    TPro  7.7  /  Alb  4.7  /  TBili  0.4  /  DBili  x   /  AST  20  /  ALT  38  /  AlkPhos  81  10-22    CAPILLARY BLOOD GLUCOSE        LIVER FUNCTIONS - ( 22 Oct 2024 15:14 )  Alb: 4.7 g/dL / Pro: 7.7 g/dL / ALK PHOS: 81 U/L / ALT: 38 U/L / AST: 20 U/L / GGT: x             PT/INR - ( 22 Oct 2024 15:14 )   PT: 12.1 sec;   INR: 1.06 ratio         PTT - ( 22 Oct 2024 15:14 )  PTT:30.0 sec  CSF:                  Radiology:  MRB 09/2024:  IMPRESSION: Abnormal T2 prolongation in the periventricular white matter region is again seen as described above. These findings are likely compatible with patient's known MS.    Abnormal enhancement involving the prechiasmatic portion of the right optic nerve is suspected.    MR C spine 09/2024:    IMPRESSION:    1. Stable cervical plaque at C4.  2. No new lesions or enhancement.

## 2024-10-22 NOTE — ED PROVIDER NOTE - OBJECTIVE STATEMENT
29F with multiple sclerosis presents to the emergency department due to periodic rapidly ascending bilateral leg numbness/weakness since 10/14. These episodes occur typically when she gets up or moves and then spontaneously resolve. She has been taking dimethyl fumarate for MS control. She denies any recent fevers, chills, cough, dysuria. No trauma. She called the office of Dr. Morin (neuro) who is out of town, but  advised to come to emergency department for further workup.

## 2024-10-22 NOTE — H&P ADULT - ASSESSMENT
NIGHT HOSPITALIST:   Referral by patient's Neurologist to the ER and seen by Neurology in the ER in the setting of patient with history of reported asthma on no Rx and with no exacerbations for years, multiple sclerosis with initial OD optic neuritis in Dec 2022 with high dose steroid and PLEX treatment, now currently on disease modifying agent of dimethyl fumarate 240 mg extended release BID (patient aware of risk/benefit of Rx) and will continue for now per patient's Neurologist.   Patient with stabilized OD vision with now 20/40 from past hand motion only in 2022.    Unclear if patient has a true cystitis but patient agrees to continued IV Rocephin with mild left shift and small bands.  Urine culture sent.    Patient agrees to MRI cervical/thoracic/lumbar spine with and without contrast per Neurology.   PT.  OOB.    THOR for now and will defer pharmacologic DVT prophylaxis to the Daytime Provider.

## 2024-10-22 NOTE — ED PROVIDER NOTE - NSICDXPASTMEDICALHX_GEN_ALL_CORE_FT
PAST MEDICAL HISTORY:  2019 novel coronavirus disease (COVID-19)     Asthma     Cystitis     MS (multiple sclerosis)

## 2024-10-22 NOTE — CONSULT NOTE ADULT - ASSESSMENT
Assessment:  Patient PATRICE ROCHA is a 29y (1995) with a PMHx significant for astham, not on meds, MS diagnosed (initially presenting as ON in 2023, later relapse with LE weakness in 2024, on DMF) comes in with intermittent episodes of b/l legs numbness and weakness. It started 10/14 when upon standing she had numbness and tingling in her toes, rapidly progressed upto hips and had weakness as well to a point that she had to sit down, whole symptoms lasted 1 hour and improved on sitting down. It has happened 3 more times since, especially on standing. This is similar t6o her relapse in 09/2024 when she had persistent b/lLE numbness and weakness, found to have demyelinating lesion on spine imaging was started on high dose steroid followed by taper. She denies any fever, chills, cough, burning urination. She does have bacteria and WBC on UA.     Impression: Pseudoflare?    Recs:  [] treatment of UTI as per primary team    INcomplete NOte Assessment:  Patient PATRICE ROCHA is a 29y (1995) with a PMHx significant for astham, not on meds, MS diagnosed (initially presenting as ON in 2023, later relapse with LE weakness in 2024, on DMF) comes in with intermittent episodes of b/l legs numbness and weakness. It started 10/14 when upon standing she had numbness and tingling in her toes, rapidly progressed upto hips and had weakness as well to a point that she had to sit down, whole symptoms lasted 1 hour and improved on sitting down. It has happened 3 more times since, especially on standing. This is similar t6o her relapse in 09/2024 when she had persistent b/lLE numbness and weakness, found to have demyelinating lesion on spine imaging was started on high dose steroid followed by taper. She denies any fever, chills, cough, burning urination. She does have bacteria and WBC on UA. Exam unremarkable.     Impression: Given intermittent and positional nature of symptoms of weakness and numbness, less likely MS flare. Could be pseudo exacerbation given UTI on UA or some benign spinal etiology    Recs:  [] treatment of UTI as per primary team  [] consider MRI C/T/L spine inpatient or outpatient, doesn't need to stay for the same    Case discussed with attending Dr. Olivera

## 2024-10-22 NOTE — H&P ADULT - NSHPSOCIALHISTORY_GEN_ALL_CORE
No tobacco or ethanol use.    Spouse aware of admission and the patient did not wish for me to contact at this hour.   Sister in law in attendance with patient's permission.

## 2024-10-22 NOTE — H&P ADULT - PROBLEM SELECTOR PLAN 1
Unclear if patient has a true cystitis but patient agrees to continued IV Rocephin with mild left shift and small bands.  Urine culture sent.

## 2024-10-22 NOTE — ED ADULT NURSE REASSESSMENT NOTE - NS ED NURSE REASSESS COMMENT FT1
Pt. pending neurology recommendations for dispo. Ambulated without difficulty to bathroom independently [facility] steady gait noted while ambulating. No assistance needed.

## 2024-10-22 NOTE — CONSULT NOTE ADULT - ATTENDING COMMENTS
HPI as per resident note, personally verified by me. Patient with history of multiple sclerosis (MS) followed by outpatient neuroimmunologist, Dr. Brandee Epperson, who had flare on 9/2024 involving weakness and numbness corresponding to active T10-T11 lesions and treated with steroids as outpatient. Since 10/14 she has noted similar symptoms to prior flare but less intense, consistent of intermittent numbness and tingling in toes that quickly spreads to hips and causes weakness with inability to ambulate. She sits down and symptoms subside within 1 hour. No other new focal neurologic deficits or abnormal movements. She called outpatient neurology office and they told her to come to ED for further evaluation. Found to have likely UTI and started on antibiotics for this.    Neurologic exam as per resident note with additions as below:  AAO x3, speech fluent  CN's II-XII intact  Strength 5/5 all  Sens intact all except for mild dec in distal BLE's/toes (baseline)  FtN intact b/l  Downgoing b/l (L > R) plantar response    CPK WNL, BHcG (-), UA (+)    A/P:  Weakness  Skin sensation disturbance  MS  Asthma  UTI    - Etiology for above intermittent and less severe neurologic symptoms similar to prior MS exacerbation likely secondary to pseudo-exacerbation due to toxic/metabolic process with current UTI. Less likely true MS exacerbation but will exclude with imaging. Could also be structural with neurogenic claudication so will assess for this. No other focal neurologic deficits or abnormal movements noted. Hold off on steroids for now  - MRI c-spine and t-spine w/ and w/o, MRI l-spine w/o to assess for above  - Continue to treat UTI  - Continue outpatient Tecfidera  - PT/OT as tolerated  - Continue to address above medical problems, as you are doing  - Will continue to follow patient with you

## 2024-10-22 NOTE — H&P ADULT - NSHPLABSRESULTS_GEN_ALL_CORE
WBC 8.9   68.1 N, 1.8 bands.    Hgb 10.6    Platelets of 380K    K+ 3.5    Random glucose of 105    Cr 0.6    Hcg negative.    UA with moderate leukocyte esterase but negative nitrite.      MR head with w/o IV contrast from Dec 15 2022 with mild abnormal enhancement and T2 hyperintensity RIGHT optic nerve.    MR thoracic and cervical spine with and w/o IV contrast from Dec 15 2022 normal.

## 2024-10-22 NOTE — H&P ADULT - NSHPADDITIONALINFOADULT_GEN_ALL_CORE
NIGHT HOSPITALIST:   Patient/ family in attendance aware of course and agree with plan/care as above.   Emotional support provided to patient/ family.   Care reviewed with covering NP/PA for endorsement to my physician colleagues in the AM.    Rai Neumann MD  Available on [employer] Teams. NIGHT HOSPITALIST:   Patient/ family in attendance aware of course and agree with plan/care as above.   Emotional support provided to patient/ family.   Care reviewed with covering NP/JEFF Luke  for endorsement to my physician colleagues in the AM.    Rai Neumann MD  Available on [employer] Teams.

## 2024-10-22 NOTE — H&P ADULT - PROBLEM SELECTOR PLAN 4
Transitions of Care Status:  1.  Name of PCP:   Roney Cross MD (PCP) [phone]  2.  PCP Contacted on Admission: [ ] Y    [x ] N    3.  PCP contacted at Discharge: [ ] Y    [ ] N    [ ] N/A  4.  Post-Discharge Appointment Date and Location:  5.  Summary of Handoff given to PCP:

## 2024-10-22 NOTE — ED ADULT TRIAGE NOTE - GLASGOW COMA SCALE: BEST MOTOR RESPONSE, MLM
Nasal congestion, body aches, cough which started Tuesday. Covid positive exposure.   Sore throat yesterday, improved today (M6) obeys commands

## 2024-10-22 NOTE — H&P ADULT - PROBLEM SELECTOR PLAN 2
Referral by patient's Neurologist to the ER and seen by Neurology in the ER in the setting of patient with history of reported asthma on no Rx and with no exacerbations for years, multiple sclerosis with initial OD optic neuritis in Dec 2022 with high dose steroid and PLEX treatment, now currently on disease modifying agent of dimethyl fumarate 240 mg extended release BID (patient aware of risk/benefit of Rx) and will continue for now per patient's Neurologist.   Patient with stabilized OD vision with now 20/40 from past hand motion only in 2022.    Patient agrees to MRI cervical/thoracic/lumbar spine with and without contrast per Neurology.   PT.  OOB.

## 2024-10-22 NOTE — ED PROVIDER NOTE - ATTENDING CONTRIBUTION TO CARE
attending Shima: 29yF h/o MS p/w periodic rapidly ascending bilateral leg numbness/weakness since 10/14. These episodes occur typically when she gets up or moves and then spontaneously resolve. She has been taking dimethyl fumarate for MS control. She denies infectious symptoms. Exam as above. Will obtain labs eval for infectious process, discuss with neuro, reassess

## 2024-10-22 NOTE — ED ADULT NURSE NOTE - OBJECTIVE STATEMENT
28 yo F with a PMH of MS on oral medication presents to the ED through triage with significant other complaining of bilateral leg pain and numbness x 8 days. Patient's outpatient neurologist Dr. Morin advised patient to come to ED today due to new/ worsening muscle weakness and pain that she has not had before. Patient is A&Ox4, speech is clear. Patient denies nausea, vomiting, fever, chills, chest pain, headache, dizziness, changes in vision/speech. Patient was seen by qPA and qRN in triage - had peripheral IV placed and labs drawn prior to patient arrival to [place] [id number]. MD Lantigua at bedside for assessment and discussion of plan of care in [facility].

## 2024-10-22 NOTE — ED PROVIDER NOTE - RAPID ASSESSMENT
28 yo F with a PMH of MS on oral medication p/w bilateral leg numbness x 10/14/24. Sxs ascending, start in her feet and travel up to thighs. Concerned that now a/w muscle weakness and pain which she has never had before. Has had IV steroids in the past which have improved her numbness. Denies nausea, vomiting, fever, chills, chest pain, headache, dizziness, changes in vision/speech.   Follows with neuro, Dr. Morin. Called the office and was advised to come to ED.    Spoke with neuro, they are aware and will eval.    **Patient was rapidly assessed in triage by me, Jessie Oropeza PA-C. A limited history was obtained. The patient will be seen and further examined and worked up in the main ED and their care will be completed by the main ED team. Receiving team will follow up on labs, medications, any clinical imaging, and perform reassessment and disposition of the patient as clinically indicated. All decisions regarding the progression of care will be made at their discretion.

## 2024-10-22 NOTE — ED PROVIDER NOTE - TOBACCO USE
sSubjective:      Patient ID: Darnell Barht is a 10 y.o. female.    Chief Complaint: Hand Pain (small finger, left hand)    On September 22, 2019 patient hit her left little finger on a door and fractured her left little finger.  She was seen in the urgent care and placed in a splint.  She is here for evaluation and treatment.      Review of patient's allergies indicates:   Allergen Reactions    Amoxil [amoxicillin] Hives       Past Medical History:   Diagnosis Date    No known health problems      Past Surgical History:   Procedure Laterality Date    NO PAST SURGERIES       Family History   Problem Relation Age of Onset    No Known Problems Mother     No Known Problems Father        No current outpatient medications on file prior to visit.     No current facility-administered medications on file prior to visit.        Social History     Social History Narrative    5th grade attends [employer]       Review of Systems   Constitution: Negative for chills and fever.   HENT: Negative for congestion.    Eyes: Negative for discharge.   Cardiovascular: Negative for chest pain.   Respiratory: Negative for cough.    Skin: Negative for rash.   Musculoskeletal: Positive for joint pain. Negative for joint swelling.   Gastrointestinal: Negative for abdominal pain and bowel incontinence.   Genitourinary: Negative for bladder incontinence.   Neurological: Negative for headaches, numbness and paresthesias.   Psychiatric/Behavioral: The patient is not nervous/anxious.          Objective:      General    Development well-developed   Nutrition well-nourished   Mood no distress    Speech normal    Tone normal        Spine    Tone tone                 Upper      Elbow  Stability no Right Elbow Unstability   no Left Elbow Unstablility    Muscle Strength normal right elbow strength  normal left elbow strength        Wrist  Tenderness Right no tenderness   Left no tenderness   Range of Motion Flexion: Right normal     Left normal   Extension:   Right normal    Left (Normal degrees)              Hand  Tenderness         Little:     Left proximal phalanx   Range of Motion Flexion:   Right normal    Left normal   Extension:   Right normal    Left normal   Pronation:     Left (No tenderness degrees)      Stability no Right Elbow Unstability  no Left Elbow Unstablility   Muscle Strength normal right elbow strength  normal left elbow strength    Swelling Right no swelling    Left no swelling       Extremity  Tone skin normal   Left Upper Extremity Tone Normal    Skin     Right: Right Upper Extremity Skin Normal   Left: Left Upper Extremity Skin Normal    Sensation Right normal  Left normal   Pulse Right 2+  Left 2+         X-rays done and images viewed by me show a torus fracture of the proximal portion of the proximal phalanx of the left little finger.       Assessment:       1. Closed nondisplaced fracture of proximal phalanx of left little finger, initial encounter           Plan:       Patient and mom instructed on reece taping and range of motion.  Return for follow up x-rays of the left little finger.    Follow up in about 3 weeks (around 10/14/2019).           Unknown if ever smoked

## 2024-10-22 NOTE — ED PROVIDER NOTE - CLINICAL SUMMARY MEDICAL DECISION MAKING FREE TEXT BOX
Patient with MS presenting with atypical neurologic deficits that self-resolve and are not present on physical exam at this time. Presentation not likely consistent with GBS, however will discuss case with on-call neurology resident to determine appropriate workup. Initial plan includes general assessment for underlying toxic metabolic cause.    Patient found to have urine with WBC and bacteria suggestive of UTI however asymptomatic. Neurology feels patient would benefit from antimicrobial treatment. Patient signed out to night team pending patient decision to stay for MRI vs pursue outpatient imaging. - Mahad Lantigua MD

## 2024-10-22 NOTE — H&P ADULT - HISTORY OF PRESENT ILLNESS
NIGHT HOSPITALIST:   Patient UNKNOWN to  me previously, assigned to me at this point via the ER to admit this 28 y/o F--followed by her office physicians above--patient's sister in law in attendance with patient's permission--patient with a history of asthma not on Rx, past COVID-19 x 4 (no treatment with convalescence at home-vaccinated x 2 with patient refusing booster jabs to date), multiple sclerosis initially admitted to [place] Dec 2022 following a 3 day history of progressive vision loss in her RIGHT (OD) eye (only able to see hand motion at the time) with MRI brain/orbits with RIGHT optic nerve enhancement consistent with optic neuritis, with MRI C T spine normal.  CSF was clear with total of 7 days of IV methylprednisolone with no significant improvement in vision with patient started on plasma exchange x 5 sessions with mild symptom improvement, with Prednisone taper completed Jan 2023.   Patient has been on dimethyl fumarate 240 mg delayed release BID per Neurology,  with followup with Ophthalmology with OD vision 20/40 and tolerating the DMA.   Patient with delivery of baby July 2024 with healthy spontaneous vaginal delivery.   But patient was directed to the ER by her Neurologist's office following symptoms since 10/14/24 of b/L leg numbness occurring with standing up and walking, resolving with recumbency, with patient noting symptoms since early Sept 2024 of B/L toe tingling (noted from office note by Neurologist).     Patient denies trauma or fall.   NO HA, no focal weakness.   Patient denies symptoms at present.   NO fever, no chills, no rigors.   NO back pain, no tearing back pain.   NO dysuria, no hematuria.   NO rash.   NO anorexia.   NO visual complaints.

## 2024-10-22 NOTE — H&P ADULT - NSHPSOURCEINFOTX_GEN_ALL_CORE
[FreeTextEntry1] : 74 yo M with HTN, Afib, CHF, IgA Multiple Myeloma on  s/p craniotomy for evacuation of SD[facility] on 12/9/2019 now with extensive subgaleal, epidural and subdural infection.  He is on 8 week course of nafcillin and ceftriaxone -tolerating well.  He is with new onset insomnia and hallucinations that coincide with discontinuing antiepileptic medication.  He will address with Dr. Mcgill.  Nausea is controlled with ondansetron. \par Plan:\par - Continue ceftriaxone 2 g IV daily\par - Continue nafcillin  2g IV q 4 h\par - Ondansetron 8 mg q 8 h PRN nausea\par - CMP, CBC, CRP, ESR to be drawn weekly by infusion company.  \par Telehealth follow up in 2 weeks.  
Reviewed Medex with patient from office note from patient's neurologist from Sept 11 2024.   Sister in law in attendance with patient's permission.

## 2024-10-22 NOTE — H&P ADULT - NSHPREVIEWOFSYSTEMS_GEN_ALL_CORE
NO HA< no focal weakness.    NO visual complaints.  NO chest pain/pressure.  NO palpitations.  NO fever, no chills, no rigors.  NO breast symptoms.    NO dysuria, no hematuria.  NO polyuria.  NO vaginal bleeding.  NO rash.  NO joint pain.  NO anorexia.

## 2024-10-23 DIAGNOSIS — G35 MULTIPLE SCLEROSIS: ICD-10-CM

## 2024-10-23 DIAGNOSIS — N30.90 CYSTITIS, UNSPECIFIED WITHOUT HEMATURIA: ICD-10-CM

## 2024-10-23 DIAGNOSIS — Z75.8 OTHER PROBLEMS RELATED TO MEDICAL FACILITIES AND OTHER HEALTH CARE: ICD-10-CM

## 2024-10-23 DIAGNOSIS — Z29.9 ENCOUNTER FOR PROPHYLACTIC MEASURES, UNSPECIFIED: ICD-10-CM

## 2024-10-23 LAB
ANION GAP SERPL CALC-SCNC: 10 MMOL/L — SIGNIFICANT CHANGE UP (ref 5–17)
BASOPHILS # BLD AUTO: 0.05 K/UL — SIGNIFICANT CHANGE UP (ref 0–0.2)
BASOPHILS NFR BLD AUTO: 0.7 % — SIGNIFICANT CHANGE UP (ref 0–2)
BUN SERPL-MCNC: 14 MG/DL — SIGNIFICANT CHANGE UP (ref 7–23)
CALCIUM SERPL-MCNC: 9.1 MG/DL — SIGNIFICANT CHANGE UP (ref 8.4–10.5)
CHLORIDE SERPL-SCNC: 105 MMOL/L — SIGNIFICANT CHANGE UP (ref 96–108)
CO2 SERPL-SCNC: 24 MMOL/L — SIGNIFICANT CHANGE UP (ref 22–31)
CREAT SERPL-MCNC: 0.67 MG/DL — SIGNIFICANT CHANGE UP (ref 0.5–1.3)
EGFR: 121 ML/MIN/1.73M2 — SIGNIFICANT CHANGE UP
EOSINOPHIL # BLD AUTO: 0.06 K/UL — SIGNIFICANT CHANGE UP (ref 0–0.5)
EOSINOPHIL NFR BLD AUTO: 0.8 % — SIGNIFICANT CHANGE UP (ref 0–6)
GLUCOSE SERPL-MCNC: 88 MG/DL — SIGNIFICANT CHANGE UP (ref 70–99)
HCT VFR BLD CALC: 33.2 % — LOW (ref 34.5–45)
HGB BLD-MCNC: 9.6 G/DL — LOW (ref 11.5–15.5)
IMM GRANULOCYTES NFR BLD AUTO: 0.7 % — SIGNIFICANT CHANGE UP (ref 0–0.9)
LYMPHOCYTES # BLD AUTO: 2.59 K/UL — SIGNIFICANT CHANGE UP (ref 1–3.3)
LYMPHOCYTES # BLD AUTO: 36.3 % — SIGNIFICANT CHANGE UP (ref 13–44)
MCHC RBC-ENTMCNC: 21.5 PG — LOW (ref 27–34)
MCHC RBC-ENTMCNC: 28.9 GM/DL — LOW (ref 32–36)
MCV RBC AUTO: 74.3 FL — LOW (ref 80–100)
MONOCYTES # BLD AUTO: 0.57 K/UL — SIGNIFICANT CHANGE UP (ref 0–0.9)
MONOCYTES NFR BLD AUTO: 8 % — SIGNIFICANT CHANGE UP (ref 2–14)
NEUTROPHILS # BLD AUTO: 3.82 K/UL — SIGNIFICANT CHANGE UP (ref 1.8–7.4)
NEUTROPHILS NFR BLD AUTO: 53.5 % — SIGNIFICANT CHANGE UP (ref 43–77)
NRBC # BLD: 0 /100 WBCS — SIGNIFICANT CHANGE UP (ref 0–0)
PLATELET # BLD AUTO: 344 K/UL — SIGNIFICANT CHANGE UP (ref 150–400)
POTASSIUM SERPL-MCNC: 4.3 MMOL/L — SIGNIFICANT CHANGE UP (ref 3.5–5.3)
POTASSIUM SERPL-SCNC: 4.3 MMOL/L — SIGNIFICANT CHANGE UP (ref 3.5–5.3)
RBC # BLD: 4.47 M/UL — SIGNIFICANT CHANGE UP (ref 3.8–5.2)
RBC # FLD: 17.9 % — HIGH (ref 10.3–14.5)
SODIUM SERPL-SCNC: 139 MMOL/L — SIGNIFICANT CHANGE UP (ref 135–145)
WBC # BLD: 7.14 K/UL — SIGNIFICANT CHANGE UP (ref 3.8–10.5)
WBC # FLD AUTO: 7.14 K/UL — SIGNIFICANT CHANGE UP (ref 3.8–10.5)

## 2024-10-23 PROCEDURE — 99223 1ST HOSP IP/OBS HIGH 75: CPT | Mod: GC

## 2024-10-23 PROCEDURE — 72158 MRI LUMBAR SPINE W/O & W/DYE: CPT | Mod: 26

## 2024-10-23 PROCEDURE — 72156 MRI NECK SPINE W/O & W/DYE: CPT | Mod: 26

## 2024-10-23 PROCEDURE — 99233 SBSQ HOSP IP/OBS HIGH 50: CPT

## 2024-10-23 PROCEDURE — 72157 MRI CHEST SPINE W/O & W/DYE: CPT | Mod: 26

## 2024-10-23 RX ORDER — DIMETHYL FUMARATE 120 MG/1
240 CAPSULE, DELAYED RELEASE ORAL
Refills: 0 | Status: DISCONTINUED | OUTPATIENT
Start: 2024-10-23 | End: 2024-10-24

## 2024-10-23 RX ORDER — DIMETHYL FUMARATE 120 MG/1
240 CAPSULE, DELAYED RELEASE ORAL
Refills: 0 | DISCHARGE

## 2024-10-23 RX ORDER — ACETAMINOPHEN 500 MG
650 TABLET ORAL EVERY 6 HOURS
Refills: 0 | Status: DISCONTINUED | OUTPATIENT
Start: 2024-10-23 | End: 2024-10-24

## 2024-10-23 RX ORDER — SULFAMETHOXAZOLE/TRIMETHOPRIM 800-160 MG
1 TABLET ORAL
Refills: 0 | Status: DISCONTINUED | OUTPATIENT
Start: 2024-10-23 | End: 2024-10-24

## 2024-10-23 RX ADMIN — Medication 1 TABLET(S): at 19:29

## 2024-10-23 RX ADMIN — DIMETHYL FUMARATE 240 MILLIGRAM(S): 120 CAPSULE, DELAYED RELEASE ORAL at 19:42

## 2024-10-23 NOTE — PROGRESS NOTE ADULT - PROBLEM SELECTOR PLAN 3
THOR for now and will defer pharmacologic DVT prophylaxis to the Daytime Provider.    dc after MRI  d/w pt in detail Parents

## 2024-10-23 NOTE — PHYSICAL THERAPY INITIAL EVALUATION ADULT - NSACTIVITYREC_GEN_A_PT
Pt is functionally independent with no skilled PT needs. Pt will be d/c'd from PT program at this time. Please re-consult if change in functional status.

## 2024-10-23 NOTE — PROGRESS NOTE ADULT - PROBLEM SELECTOR PLAN 1
urine culture sent, can f/u result as outpt  received 1 dose of ceftriaxone last night, can transition to PO today given no history of recurrent UTI

## 2024-10-23 NOTE — PHYSICAL THERAPY INITIAL EVALUATION ADULT - PERTINENT HX OF CURRENT PROBLEM, REHAB EVAL
30 y/o F--followed by her office physicians above--patient's sister in law in attendance with patient's permission--patient with a history of asthma not on Rx, past COVID-19 x 4 (no treatment with convalescence at home-vaccinated x 2 with patient refusing booster jabs to date), multiple sclerosis initially admitted to [place] Dec 2022 following a 3 day history of progressive vision loss in her RIGHT (OD) eye (only able to see hand motion at the time) with MRI brain/orbits with RIGHT optic nerve enhancement consistent with optic neuritis, with MRI C T spine normal.  CSF was clear with total of 7 days of IV methylprednisolone with no significant improvement in vision with patient started on plasma exchange x 5 sessions with mild symptom improvement, with Prednisone taper completed Jan 2023.   Patient has been on dimethyl fumarate 240 mg delayed release BID per Neurology,  with followup with Ophthalmology with OD vision 20/40 and tolerating the DMA.   Patient with delivery of baby July 2024 with healthy spontaneous vaginal delivery.   But patient was directed to the ER by her Neurologist's office following symptoms since 10/14/24 of b/L leg numbness occurring with standing up and walking, resolving with recumbency, with patient noting symptoms since early Sept 2024 of B/L toe tingling (noted from office note by Neurologist).     Patient denies trauma or fall.   NO HA, no focal weakness.   Patient denies symptoms at present.   NO fever, no chills, no rigors.   NO back pain, no tearing back pain.   NO dysuria, no hematuria.   NO rash.   NO anorexia.   NO visual complaints.

## 2024-10-23 NOTE — PROGRESS NOTE ADULT - SUBJECTIVE AND OBJECTIVE BOX
[facility] Division of Hospital Medicine  Karina Momin MD  MS Teams PREFERRED        SUBJECTIVE / OVERNIGHT EVENTS: Seen and examined at bedside. NAD.     MEDICATIONS  (STANDING):  cefTRIAXone   IVPB 1000 milliGRAM(s) IV Intermittent every 24 hours  dimethyl fumarate DR Capsule 240 milliGRAM(s) Oral two times a day    MEDICATIONS  (PRN):  acetaminophen     Tablet .. 650 milliGRAM(s) Oral every 6 hours PRN Temp greater or equal to 38C (100.4F), Mild Pain (1 - 3)      I&O's Summary      PHYSICAL EXAM:  Vital Signs Last 24 Hrs  T(C): 37.2 (23 Oct 2024 10:21), Max: 37.3 (23 Oct 2024 02:38)  T(F): 99 (23 Oct 2024 10:21), Max: 99.1 (23 Oct 2024 02:38)  HR: 82 (23 Oct 2024 10:21) (81 - 95)  BP: 92/59 (23 Oct 2024 10:21) (92/59 - 133/76)  BP(mean): --  RR: 18 (23 Oct 2024 10:21) (17 - 18)  SpO2: 98% (23 Oct 2024 10:21) (96% - 99%)    Parameters below as of 23 Oct 2024 10:21  Patient On (Oxygen Delivery Method): room air      CONSTITUTIONAL: NAD, well-developed, well-groomed  EYES: PERRLA; conjunctiva and sclera clear  ENMT: Moist oral mucosa, no pharyngeal injection or exudates;   NECK: Supple, no palpable masses;   RESPIRATORY: Normal respiratory effort; lungs are clear to auscultation bilaterally  CARDIOVASCULAR: Regular rate and rhythm, normal S1 and S2, no murmur/rub/gallop; No lower extremity edema;   ABDOMEN: Nontender to palpation, normoactive bowel sounds, no rebound/guarding;   MUSCULOSKELETAL:  no clubbing or cyanosis of digits; no joint swelling or tenderness to palpation  PSYCH: A+O to person, place, and time; affect appropriate  NEUROLOGY: CN 2-12 are intact and symmetric; no gross sensory deficits   SKIN: No rashes; no palpable lesions    LABS:                        9.6    7.14  )-----------( 344      ( 23 Oct 2024 07:03 )             33.2     10-23    139  |  105  |  14  ----------------------------<  88  4.3   |  24  |  0.67    Ca    9.1      23 Oct 2024 07:03    TPro  7.7  /  Alb  4.7  /  TBili  0.4  /  DBili  x   /  AST  20  /  ALT  38  /  AlkPhos  81  10-22    PT/INR - ( 22 Oct 2024 15:14 )   PT: 12.1 sec;   INR: 1.06 ratio         PTT - ( 22 Oct 2024 15:14 )  PTT:30.0 sec      Urinalysis Basic - ( 23 Oct 2024 07:03 )    Color: x / Appearance: x / SG: x / pH: x  Gluc: 88 mg/dL / Ketone: x  / Bili: x / Urobili: x   Blood: x / Protein: x / Nitrite: x   Leuk Esterase: x / RBC: x / WBC x   Sq Epi: x / Non Sq Epi: x / Bacteria: x

## 2024-10-23 NOTE — PHYSICAL THERAPY INITIAL EVALUATION ADULT - ADDITIONAL COMMENTS
Pt lives in a private house with spouse, baby, in-laws with +steps to bedroom. Prior to admission, pt was independent with ADLs and ambulation without AD.

## 2024-10-23 NOTE — PROGRESS NOTE ADULT - PROBLEM SELECTOR PLAN 4
Transitions of Care Status:  1.  Name of PCP:   Roney Cross MD (PCP) [phone]  2.  PCP Contacted on Admission: [ ] Y    [x ] N    3.  PCP contacted at Discharge: [ ] Y    [ ] N    [ ] N/A  4.  Post-Discharge Appointment Date and Location:  5.  Summary of Handoff given to PCP:

## 2024-10-24 ENCOUNTER — TRANSCRIPTION ENCOUNTER (OUTPATIENT)
Age: 29
End: 2024-10-24

## 2024-10-24 VITALS
RESPIRATION RATE: 18 BRPM | OXYGEN SATURATION: 98 % | SYSTOLIC BLOOD PRESSURE: 112 MMHG | TEMPERATURE: 98 F | DIASTOLIC BLOOD PRESSURE: 58 MMHG | HEART RATE: 94 BPM

## 2024-10-24 LAB
CULTURE RESULTS: SIGNIFICANT CHANGE UP
SPECIMEN SOURCE: SIGNIFICANT CHANGE UP

## 2024-10-24 PROCEDURE — 85730 THROMBOPLASTIN TIME PARTIAL: CPT

## 2024-10-24 PROCEDURE — 72156 MRI NECK SPINE W/O & W/DYE: CPT | Mod: MC

## 2024-10-24 PROCEDURE — 72158 MRI LUMBAR SPINE W/O & W/DYE: CPT | Mod: MC

## 2024-10-24 PROCEDURE — 72157 MRI CHEST SPINE W/O & W/DYE: CPT | Mod: MC

## 2024-10-24 PROCEDURE — 80048 BASIC METABOLIC PNL TOTAL CA: CPT

## 2024-10-24 PROCEDURE — 99285 EMERGENCY DEPT VISIT HI MDM: CPT | Mod: 25

## 2024-10-24 PROCEDURE — 80053 COMPREHEN METABOLIC PANEL: CPT

## 2024-10-24 PROCEDURE — 81001 URINALYSIS AUTO W/SCOPE: CPT

## 2024-10-24 PROCEDURE — 87086 URINE CULTURE/COLONY COUNT: CPT

## 2024-10-24 PROCEDURE — 96374 THER/PROPH/DIAG INJ IV PUSH: CPT

## 2024-10-24 PROCEDURE — A9585: CPT

## 2024-10-24 PROCEDURE — 97161 PT EVAL LOW COMPLEX 20 MIN: CPT

## 2024-10-24 PROCEDURE — 82550 ASSAY OF CK (CPK): CPT

## 2024-10-24 PROCEDURE — 99239 HOSP IP/OBS DSCHRG MGMT >30: CPT

## 2024-10-24 PROCEDURE — 84702 CHORIONIC GONADOTROPIN TEST: CPT

## 2024-10-24 PROCEDURE — 85025 COMPLETE CBC W/AUTO DIFF WBC: CPT

## 2024-10-24 PROCEDURE — 85610 PROTHROMBIN TIME: CPT

## 2024-10-24 RX ORDER — SULFAMETHOXAZOLE/TRIMETHOPRIM 800-160 MG
1 TABLET ORAL
Qty: 1 | Refills: 0
Start: 2024-10-24 | End: 2024-10-24

## 2024-10-24 RX ADMIN — Medication 1 TABLET(S): at 05:07

## 2024-10-24 RX ADMIN — DIMETHYL FUMARATE 240 MILLIGRAM(S): 120 CAPSULE, DELAYED RELEASE ORAL at 08:00

## 2024-10-24 NOTE — DISCHARGE NOTE NURSING/CASE MANAGEMENT/SOCIAL WORK - FINANCIAL ASSISTANCE
[facility] provides services at a reduced cost to those who are determined to be eligible through [facility]’s financial assistance program. Information regarding [facility]’s financial assistance program can be found by going to [email or web address] or by calling [phone].

## 2024-10-24 NOTE — DISCHARGE NOTE NURSING/CASE MANAGEMENT/SOCIAL WORK - NSDCFUADDAPPT_GEN_ALL_CORE_FT
APPTS ARE READY TO BE MADE: [x ] YES    Best Family or Patient Contact (if needed):    Additional Information about above appointments (if needed):    1: pcp  2: neuro  3:     Other comments or requests:

## 2024-10-24 NOTE — DISCHARGE NOTE PROVIDER - PROVIDER TOKENS
PROVIDER:[TOKEN:[[id number]],FOLLOWUP:[1 week],ESTABLISHEDPATIENT:[T]],PROVIDER:[TOKEN:[[id number]],FOLLOWUP:[1-3 days],ESTABLISHEDPATIENT:[T]]

## 2024-10-24 NOTE — CHART NOTE - NSCHARTNOTEFT_GEN_A_CORE
Discussed results of MRI spine again with patient, C spine lesion was present in 09/2024, but slightly increased in size, no enhancement, could be disease process related and not MS exacerbation. Symptoms likely pseudo-exacerbation from underlying infection, continue UTI treatment as per primary team. Emailed Dr. Epperson's office for closer follow up and repeat MRI as per Dr. Epperson. Ok to discharge from neurology standpoint.

## 2024-10-24 NOTE — DISCHARGE NOTE PROVIDER - HOSPITAL COURSE
HPI:   30 y/o F--with a history of asthma not on Rx, , multiple sclerosis initially admitted to [facility] Dec 2022 following a 3 day history of progressive vision loss in her RIGHT (OD) eye (only able to see hand motion at the time) with MRI brain/orbits with RIGHT optic nerve enhancement consistent with optic neuritis, with MRI C T spine normal.  CSF was clear with total of 7 days of IV methylprednisolone with no significant improvement in vision with patient started on plasma exchange x 5 sessions with mild symptom improvement, with Prednisone taper completed Jan 2023.   Patient has been on dimethyl fumarate 240 mg delayed release BID per Neurology,  with followup with Ophthalmology with OD vision 20/40 and tolerating the DMA.   Patient with delivery of baby July 2024 with healthy spontaneous vaginal delivery.   But patient was directed to the ER by her Neurologist's office following symptoms since 10/14/24 of b/L leg numbness occurring with standing up and walking, resolving with recumbency, with patient noting symptoms since early Sept 2024 of B/L toe tingling (noted from office note by Neurologist).     Patient denies trauma or fall.   NO HA, no focal weakness.   Patient denies symptoms at present.   NO fever, no chills, no rigors.   NO back pain, no tearing back pain.   NO dysuria, no hematuria.   NO rash.   NO anorexia.   NO visual complaints. (22 Oct 2024 23:52)    Hospital Course:  Pt admitted for b/l LE numbness/weakness since 10/14/24. Neuro consulted and recommending MRI C/T/L spine to be done. Per neurology - Low concern for MS flare given sx intermittent, likely pseudo flare in setting of UTI or benign spinal process. MRI C/T/L spine done 10/23 with results of ......***INCOMPLETE***  Pt found to have cystitis with positive UA, 1 dose of ceftriaxone was given in the ED and patient was subsequently started on bactrim. UCx sent.       Important Medication Changes and Reason:    Active or Pending Issues Requiring Follow-up:    Advanced Directives:   [ ] Full code  [ ] DNR  [ ] Hospice    Discharge Diagnoses:         HPI:   28 y/o F--with a history of asthma not on Rx, , multiple sclerosis initially admitted to [facility] Dec 2022 following a 3 day history of progressive vision loss in her RIGHT (OD) eye (only able to see hand motion at the time) with MRI brain/orbits with RIGHT optic nerve enhancement consistent with optic neuritis, with MRI C T spine normal.  CSF was clear with total of 7 days of IV methylprednisolone with no significant improvement in vision with patient started on plasma exchange x 5 sessions with mild symptom improvement, with Prednisone taper completed Jan 2023.   Patient has been on dimethyl fumarate 240 mg delayed release BID per Neurology,  with followup with Ophthalmology with OD vision 20/40 and tolerating the DMA.   Patient with delivery of baby July 2024 with healthy spontaneous vaginal delivery.   But patient was directed to the ER by her Neurologist's office following symptoms since 10/14/24 of b/L leg numbness occurring with standing up and walking, resolving with recumbency, with patient noting symptoms since early Sept 2024 of B/L toe tingling (noted from office note by Neurologist).     Patient denies trauma or fall.   NO HA, no focal weakness.   Patient denies symptoms at present.   NO fever, no chills, no rigors.   NO back pain, no tearing back pain.   NO dysuria, no hematuria.   NO rash.   NO anorexia.   NO visual complaints. (22 Oct 2024 23:52)    Hospital Course:  Pt admitted for b/l LE numbness/weakness since 10/14/24. Neuro consulted and recommending MRI C/T/L spine to be done. Per neurology - Low concern for MS flare given sx intermittent, likely pseudo flare in setting of UTI or benign spinal process. MRI C/T/L spine done 10/23 with results:  1. CERVICAL SPINE:   Focal cord lesion C4 level has increased in size   since 9/19/2024. No interval cord lesion. No abnormal enhancement.   Minimal mid cervical degenerative disc disease.    2. THORACIC SPINE:   Focal cord lesion T11 level is unchanged since   9/19/2024. No interval cord lesion.  No abnormal enhancement. No   significant thoracic degenerative disc disease.    3. LUMBAR SPINE:   Intact conus and cauda equina.   No abnormal   enhancement.    L5-S1 focal left central disc protrusion  (disc   herniation)  Neurology____________________________  Pt found to have cystitis with positive UA, 1 dose of ceftriaxone was given in the ED and patient was subsequently started on bactrim. UCx sent.       Important Medication Changes and Reason: Last dose of Bactrim 10/24 pm dose    Active or Pending Issues Requiring Follow-up: urine culture, neurology, pcp    Advanced Directives:   [ x] Full code  [ ] DNR  [ ] Hospice    Discharge Diagnoses:  MS (multiple sclerosis)  Cystitis  Leg weakness     HPI:   28 y/o F--with a history of asthma not on Rx, , multiple sclerosis initially admitted to [facility] Dec 2022 following a 3 day history of progressive vision loss in her RIGHT (OD) eye (only able to see hand motion at the time) with MRI brain/orbits with RIGHT optic nerve enhancement consistent with optic neuritis, with MRI C T spine normal.  CSF was clear with total of 7 days of IV methylprednisolone with no significant improvement in vision with patient started on plasma exchange x 5 sessions with mild symptom improvement, with Prednisone taper completed Jan 2023.   Patient has been on dimethyl fumarate 240 mg delayed release BID per Neurology,  with followup with Ophthalmology with OD vision 20/40 and tolerating the DMA.   Patient with delivery of baby July 2024 with healthy spontaneous vaginal delivery.   But patient was directed to the ER by her Neurologist's office following symptoms since 10/14/24 of b/L leg numbness occurring with standing up and walking, resolving with recumbency, with patient noting symptoms since early Sept 2024 of B/L toe tingling (noted from office note by Neurologist).     Patient denies trauma or fall.   NO HA, no focal weakness.   Patient denies symptoms at present.   NO fever, no chills, no rigors.   NO back pain, no tearing back pain.   NO dysuria, no hematuria.   NO rash.   NO anorexia.   NO visual complaints. (22 Oct 2024 23:52)    Hospital Course:  Pt admitted for b/l LE numbness/weakness since 10/14/24. Neuro consulted and recommending MRI C/T/L spine to be done. Per neurology - Low concern for MS flare given sx intermittent, likely pseudo flare in setting of UTI or benign spinal process. MRI C/T/L spine done 10/23 with results:  1. CERVICAL SPINE:   Focal cord lesion C4 level has increased in size   since 9/19/2024. No interval cord lesion. No abnormal enhancement.   Minimal mid cervical degenerative disc disease.    2. THORACIC SPINE:   Focal cord lesion T11 level is unchanged since   9/19/2024. No interval cord lesion.  No abnormal enhancement. No   significant thoracic degenerative disc disease.    3. LUMBAR SPINE:   Intact conus and cauda equina.   No abnormal   enhancement.    L5-S1 focal left central disc protrusion  (disc   herniation)  Neurology: Discussed results of MRI spine again with patient, C spine lesion was present in 09/2024, but slightly increased in size, no enhancement, could be disease process related and not MS exacerbation. Symptoms likely pseudo-exacerbation from underlying infection, continue UTI treatment as per primary team. Emailed Dr. Epperson's office for closer follow up and repeat MRI as per Dr. Epperson. Ok to discharge from neurology standpoint.    Pt found to have cystitis with positive UA, 1 dose of ceftriaxone was given in the ED and patient was subsequently started on bactrim. UCx sent.       Important Medication Changes and Reason: Last dose of Bactrim 10/24 pm dose    Active or Pending Issues Requiring Follow-up: urine culture, neurology, pcp    Advanced Directives:   [ x] Full code  [ ] DNR  [ ] Hospice    Discharge Diagnoses:  MS (multiple sclerosis)  Cystitis  Leg weakness

## 2024-10-24 NOTE — DISCHARGE NOTE PROVIDER - CARE PROVIDER_API CALL
Brandee Epperson  Neurology  [address] [facility] [address]  [place] [address]  Phone: [phone]  Fax: [phone]  Established Patient  Follow Up Time: 1 week    Roney Cross  Internal Medicine  [address] [place] [id number]  Phone: [phone]  Fax: [phone]  Established Patient  Follow Up Time: 1-3 days

## 2024-10-24 NOTE — DISCHARGE NOTE PROVIDER - REASON FOR ADMISSION
General Sunscreen Counseling: I recommended a broad spectrum sunscreen with a SPF of 30 or higher. I explained that SPF 30 sunscreens block approximately 97 percent of the sun's harmful rays. Sunscreens should be applied at least 15 minutes prior to expected sun exposure and then every 2 hours after that as long as sun exposure continues. If swimming or exercising sunscreen should be reapplied every 45 minutes to an hour after getting wet or sweating. One ounce, or the equivalent of a shot glass full of sunscreen, is adequate to protect the skin not covered by a bathing suit. I also recommended a lip balm with a sunscreen as well. Sun protective clothing can be used in lieu of sunscreen but must be worn the entire time you are exposed to the sun's rays. Detail Level: Detailed Directed to the ER by Neurologist's office for B/L leg numbness since 10/14/24.

## 2024-10-24 NOTE — CHART NOTE - NSCHARTNOTEFT_GEN_A_CORE
< from: MR Cervical Spine w/wo IV Cont (10.23.24 @ 21:20) >    1. CERVICAL SPINE:   Focal cord lesion C4 level has increased in size   since 9/19/2024. No interval cord lesion. No abnormal enhancement.   Minimal mid cervical degenerative disc disease.    2. THORACIC SPINE:   Focal cord lesion T11 level is unchanged since   9/19/2024. No interval cord lesion.  No abnormal enhancement. No   significant thoracic degenerative disc disease.    3. LUMBAR SPINE:   Intact conus and cauda equina.   No abnormal   enhancement.    L5-S1 focal left central disc protrusion  (disc   herniation)      Impression: Not consistent with new MS relapse likely pseudo flare i/s/o infection.     - Continue to treat UTI  - Continue outpatient Tecfidera  - PT/OT as tolerated  - Continue to address above medical problems, as you are doing  -Outpatient neurology follow up with home Neurologist < from: MR Cervical Spine w/wo IV Cont (10.23.24 @ 21:20) >    1. CERVICAL SPINE:   Focal cord lesion C4 level has increased in size   since 9/19/2024. No interval cord lesion. No abnormal enhancement.   Minimal mid cervical degenerative disc disease.    2. THORACIC SPINE:   Focal cord lesion T11 level is unchanged since   9/19/2024. No interval cord lesion.  No abnormal enhancement. No   significant thoracic degenerative disc disease.    3. LUMBAR SPINE:   Intact conus and cauda equina.   No abnormal   enhancement.    L5-S1 focal left central disc protrusion  (disc   herniation)      Impression: Not consistent with new MS relapse likely pseudo flare i/s/o infection. No evidence of enhancement on any MRI's to indicate active lesions.    - Continue to treat UTI  - Continue outpatient Tecfidera  - No role for high dose steroids  - PT/OT as tolerated  - Continue to address above medical problems, as you are doing  - Outpatient neurology follow up with home Neurologist  - Neurology will sign off, please call [phone] with additional questions or concerns

## 2024-10-24 NOTE — DISCHARGE NOTE NURSING/CASE MANAGEMENT/SOCIAL WORK - PATIENT PORTAL LINK FT
You can access the FollowMyHealth Patient Portal offered by [facility] by registering at the following website: [email or web address]. By joining [facility]’s FollowMyHealth portal, you will also be able to view your health information using other applications (apps) compatible with our system.

## 2024-10-24 NOTE — DISCHARGE NOTE NURSING/CASE MANAGEMENT/SOCIAL WORK - NSTRANSFERBELONGINGSRESP_GEN_A_NUR
This is a follow-up visit to the patient, providing a spiritual presence, emotional support and prayer. The patient appears to be resting.     Kelin Lakhani, [address] [facility], [facility]   yes

## 2024-10-24 NOTE — DISCHARGE NOTE PROVIDER - NSDCFUADDAPPT_GEN_ALL_CORE_FT
APPTS ARE READY TO BE MADE: [x ] YES    Best Family or Patient Contact (if needed):    Additional Information about above appointments (if needed):    1: pcp  2: neuro  3:     Other comments or requests:    APPTS ARE READY TO BE MADE: [x ] YES    Best Family or Patient Contact (if needed):    Additional Information about above appointments (if needed):    1: pcp  2: neuro  3:     Other comments or requests:           Prior to outreaching the patient, it was visible that the patient has secured a follow up appointment which was not scheduled by our team.. I contacted Dr. Epperson  Marcella requesting sooner appointment- Marcella stated sent an email to Yany Glynn'  to give a patient a call before appointment on 2/26/24 at 4 p.m. [address] [facility] [id number]

## 2024-10-24 NOTE — DISCHARGE NOTE PROVIDER - NSDCMRMEDTOKEN_GEN_ALL_CORE_FT
Dimethyl Fumarate: 240 milligram(s) orally 2 times a day DELAYED RELEASE-- DISEASE MODIFYING THERAPY FOR MULTIPLE SCLEROSIS   Dimethyl Fumarate: 240 milligram(s) orally 2 times a day DELAYED RELEASE-- DISEASE MODIFYING THERAPY FOR MULTIPLE SCLEROSIS  sulfamethoxazole-trimethoprim 800 mg-160 mg oral tablet: 1 tab(s) orally 2 times a day last dose at 6 pm today- pt to take pill home

## 2024-10-24 NOTE — DISCHARGE NOTE PROVIDER - CARE PROVIDERS DIRECT ADDRESSES
[email or web address]

## 2024-10-24 NOTE — DISCHARGE NOTE PROVIDER - NSDCCPCAREPLAN_GEN_ALL_CORE_FT
PRINCIPAL DISCHARGE DIAGNOSIS  Diagnosis: Cystitis  Assessment and Plan of Treatment: You were given Ceftriaxone IV day one then Bactrim oral . Last dose of antibiotic is 10/24 evening dose  follow up with pcp the results of urine culture      SECONDARY DISCHARGE DIAGNOSES  Diagnosis: Multiple sclerosis  Assessment and Plan of Treatment: Take all home meds as presribed, follow up with your neurologist  return if worsens    Diagnosis: Leg weakness  Assessment and Plan of Treatment: Plan as above

## 2024-10-24 NOTE — DISCHARGE NOTE PROVIDER - ATTENDING DISCHARGE PHYSICAL EXAMINATION:
Seen at bedside. NAD.  Vital Signs Last 24 Hrs  T(C): 36.9 (24 Oct 2024 11:07), Max: 36.9 (24 Oct 2024 11:07)  T(F): 98.4 (24 Oct 2024 11:07), Max: 98.4 (24 Oct 2024 11:07)  HR: 94 (24 Oct 2024 11:07) (68 - 97)  BP: 112/58 (24 Oct 2024 11:07) (112/58 - 117/79)  BP(mean): --  RR: 18 (24 Oct 2024 11:07) (18 - 18)  SpO2: 98% (24 Oct 2024 11:07) (98% - 98%)    Parameters below as of 24 Oct 2024 11:07  Patient On (Oxygen Delivery Method): room air        CONSTITUTIONAL: NAD, well-developed, well-groomed  EYES: PERRLA; conjunctiva and sclera clear  ENMT: Moist oral mucosa, no pharyngeal injection or exudates; normal dentition  NECK: Supple, no palpable masses; no thyromegaly  RESPIRATORY: Normal respiratory effort; lungs are clear to auscultation bilaterally  CARDIOVASCULAR: Regular rate and rhythm, normal S1 and S2, no murmur/rub/gallop; No lower extremity edema; Peripheral pulses are 2+ bilaterally  ABDOMEN: Nontender to palpation, normoactive bowel sounds, no rebound/guarding; No hepatosplenomegaly  MUSCULOSKELETAL:  Normal gait; no clubbing or cyanosis of digits; no joint swelling or tenderness to palpation  PSYCH: A+O to person, place, and time; affect appropriate  NEUROLOGY: CN 2-12 are intact and symmetric; no gross sensory deficits   SKIN: No rashes; no palpable lesions

## 2024-12-11 NOTE — DISCHARGE NOTE PROVIDER - NSDCACTIVITY_GEN_ALL_CORE
PAST SURGICAL HISTORY:  History of endoscopy     Status post colonoscopy      No restrictions PAST SURGICAL HISTORY:  History of cataract extraction     History of endoscopy     Status post colonoscopy

## 2025-02-12 NOTE — OB RN PATIENT PROFILE - EDUCATION OF PARENTS ON THE BENEFITS OF SKIN TO SKIN AND BREASTFEEDING TO BOTH MOTHER AND INFANT.
Diffuse bilateral expiratory wheezing throughout.  No stridor.  +Tachypnea. Patient speaking in short sentences Statement Selected

## 2025-02-26 ENCOUNTER — APPOINTMENT (OUTPATIENT)
Dept: NEUROLOGY | Facility: CLINIC | Age: 30
End: 2025-02-26
Payer: COMMERCIAL

## 2025-02-26 VITALS
BODY MASS INDEX: 30.83 KG/M2 | DIASTOLIC BLOOD PRESSURE: 76 MMHG | HEART RATE: 92 BPM | HEIGHT: 63 IN | SYSTOLIC BLOOD PRESSURE: 121 MMHG | OXYGEN SATURATION: 94 % | WEIGHT: 174 LBS

## 2025-02-26 DIAGNOSIS — G35 MULTIPLE SCLEROSIS: ICD-10-CM

## 2025-02-26 PROCEDURE — G2211 COMPLEX E/M VISIT ADD ON: CPT | Mod: NC

## 2025-02-26 PROCEDURE — 99215 OFFICE O/P EST HI 40 MIN: CPT

## 2025-02-26 RX ORDER — METHYLPREDNISOLONE 125 MG/2ML
125 INJECTION, POWDER, LYOPHILIZED, FOR SOLUTION INTRAMUSCULAR; INTRAVENOUS
Refills: 0 | Status: ACTIVE | OUTPATIENT
Start: 2025-02-26

## 2025-02-26 RX ADMIN — Medication 0 MG: at 00:00

## 2025-02-26 RX ADMIN — METHYLPREDNISOLONE 0 MG: 125 INJECTION, POWDER, LYOPHILIZED, FOR SOLUTION INTRAMUSCULAR; INTRAVENOUS at 00:00

## 2025-02-27 ENCOUNTER — TRANSCRIPTION ENCOUNTER (OUTPATIENT)
Age: 30
End: 2025-02-27

## 2025-02-27 LAB
25(OH)D3 SERPL-MCNC: 40.5 NG/ML
ALBUMIN SERPL ELPH-MCNC: 4.8 G/DL
ALP BLD-CCNC: 104 U/L
ALT SERPL-CCNC: 24 U/L
ANION GAP SERPL CALC-SCNC: 17 MMOL/L
AST SERPL-CCNC: 14 U/L
BASOPHILS # BLD AUTO: 0.08 K/UL
BASOPHILS NFR BLD AUTO: 0.8 %
BILIRUB SERPL-MCNC: 0.2 MG/DL
BUN SERPL-MCNC: 14 MG/DL
CALCIUM SERPL-MCNC: 10.3 MG/DL
CHLORIDE SERPL-SCNC: 100 MMOL/L
CO2 SERPL-SCNC: 23 MMOL/L
CREAT SERPL-MCNC: 0.78 MG/DL
DEPRECATED KAPPA LC FREE/LAMBDA SER: 1.14 RATIO
EGFR: 105 ML/MIN/1.73M2
EOSINOPHIL # BLD AUTO: 0.22 K/UL
EOSINOPHIL NFR BLD AUTO: 2.3 %
GLUCOSE SERPL-MCNC: 93 MG/DL
HCT VFR BLD CALC: 42.5 %
HGB BLD-MCNC: 12.9 G/DL
IGA SER QL IEP: 254 MG/DL
IGG SER QL IEP: 1227 MG/DL
IGM SER QL IEP: 257 MG/DL
IMM GRANULOCYTES NFR BLD AUTO: 0.2 %
KAPPA LC CSF-MCNC: 1.72 MG/DL
KAPPA LC SERPL-MCNC: 1.96 MG/DL
LYMPHOCYTES # BLD AUTO: 3.25 K/UL
LYMPHOCYTES NFR BLD AUTO: 33.4 %
MAN DIFF?: NORMAL
MCHC RBC-ENTMCNC: 24.3 PG
MCHC RBC-ENTMCNC: 30.4 G/DL
MCV RBC AUTO: 80 FL
MONOCYTES # BLD AUTO: 0.55 K/UL
MONOCYTES NFR BLD AUTO: 5.7 %
NEUTROPHILS # BLD AUTO: 5.61 K/UL
NEUTROPHILS NFR BLD AUTO: 57.6 %
PLATELET # BLD AUTO: 413 K/UL
POTASSIUM SERPL-SCNC: 4.4 MMOL/L
PROT SERPL-MCNC: 8.2 G/DL
RBC # BLD: 5.31 M/UL
RBC # FLD: 14.7 %
SODIUM SERPL-SCNC: 139 MMOL/L
VZV AB TITR SER: POSITIVE
VZV IGG SER IF-ACNC: 22.7 S/CO
WBC # FLD AUTO: 9.73 K/UL

## 2025-02-27 RX ORDER — UBLITUXIMAB 150 MG/6ML
150 INJECTION, SOLUTION, CONCENTRATE INTRAVENOUS
Qty: 4 | Refills: 3 | Status: ACTIVE | COMMUNITY
Start: 2025-02-26 | End: 1900-01-01

## 2025-02-27 RX ORDER — UBLITUXIMAB 150 MG/6ML
150 INJECTION, SOLUTION, CONCENTRATE INTRAVENOUS
Qty: 3 | Refills: 3 | Status: ACTIVE | COMMUNITY
Start: 2025-02-26 | End: 1900-01-01

## 2025-02-28 LAB
CD16+CD56+ CELLS # BLD: 319 CELLS/UL
CD16+CD56+ CELLS NFR BLD: 10 %
CD19 CELLS NFR BLD: 394 CELLS/UL
CD3 CELLS # BLD: 2428 CELLS/UL
CD3 CELLS NFR BLD: 76 %
CD3+CD4+ CELLS # BLD: 1239 CELLS/UL
CD3+CD4+ CELLS NFR BLD: 39 %
CD3+CD4+ CELLS/CD3+CD8+ CLL SPEC: 1.08 RATIO
CD3+CD8+ CELLS # SPEC: 1145 CELLS/UL
CD3+CD8+ CELLS NFR BLD: 36 %
CELLS.CD3-CD19+/CELLS IN BLOOD: 12 %
HBV CORE IGG+IGM SER QL: NONREACTIVE
HBV CORE IGM SER QL: NONREACTIVE
HBV SURFACE AB SER QL: NONREACTIVE
HBV SURFACE AG SER QL: NONREACTIVE

## 2025-03-03 LAB
M TB IFN-G BLD-IMP: NEGATIVE
QUANTIFERON TB PLUS MITOGEN MINUS NIL: >10 IU/ML
QUANTIFERON TB PLUS NIL: 0.04 IU/ML
QUANTIFERON TB PLUS TB1 MINUS NIL: 0 IU/ML
QUANTIFERON TB PLUS TB2 MINUS NIL: 0 IU/ML
VZV IGM SER IF-ACNC: <0.91 INDEX

## 2025-03-19 ENCOUNTER — NON-APPOINTMENT (OUTPATIENT)
Age: 30
End: 2025-03-19

## 2025-03-24 ENCOUNTER — APPOINTMENT (OUTPATIENT)
Dept: NEUROLOGY | Facility: CLINIC | Age: 30
End: 2025-03-24

## 2025-04-07 ENCOUNTER — APPOINTMENT (OUTPATIENT)
Dept: NEUROLOGY | Facility: CLINIC | Age: 30
End: 2025-04-07

## 2025-05-23 ENCOUNTER — RX RENEWAL (OUTPATIENT)
Age: 30
End: 2025-05-23

## 2025-06-11 ENCOUNTER — NON-APPOINTMENT (OUTPATIENT)
Age: 30
End: 2025-06-11

## 2025-06-12 ENCOUNTER — APPOINTMENT (OUTPATIENT)
Dept: NEUROLOGY | Facility: CLINIC | Age: 30
End: 2025-06-12

## 2025-06-13 ENCOUNTER — APPOINTMENT (OUTPATIENT)
Dept: NEUROLOGY | Facility: CLINIC | Age: 30
End: 2025-06-13

## 2025-06-13 PROCEDURE — 96413 CHEMO IV INFUSION 1 HR: CPT

## 2025-06-13 PROCEDURE — 96415 CHEMO IV INFUSION ADDL HR: CPT

## 2025-06-26 ENCOUNTER — APPOINTMENT (OUTPATIENT)
Dept: NEUROLOGY | Facility: CLINIC | Age: 30
End: 2025-06-26

## 2025-06-27 ENCOUNTER — APPOINTMENT (OUTPATIENT)
Dept: NEUROLOGY | Facility: CLINIC | Age: 30
End: 2025-06-27

## 2025-06-27 PROCEDURE — 96413 CHEMO IV INFUSION 1 HR: CPT

## 2025-09-16 DIAGNOSIS — G35 MULTIPLE SCLEROSIS: ICD-10-CM
